# Patient Record
Sex: FEMALE | Race: BLACK OR AFRICAN AMERICAN | NOT HISPANIC OR LATINO | URBAN - METROPOLITAN AREA
[De-identification: names, ages, dates, MRNs, and addresses within clinical notes are randomized per-mention and may not be internally consistent; named-entity substitution may affect disease eponyms.]

---

## 2017-08-16 ENCOUNTER — OUTPATIENT (OUTPATIENT)
Dept: OUTPATIENT SERVICES | Facility: HOSPITAL | Age: 47
LOS: 1 days | End: 2017-08-16
Payer: COMMERCIAL

## 2017-08-16 ENCOUNTER — APPOINTMENT (OUTPATIENT)
Dept: MAMMOGRAPHY | Facility: CLINIC | Age: 47
End: 2017-08-16
Payer: COMMERCIAL

## 2017-08-16 DIAGNOSIS — Z12.31 ENCOUNTER FOR SCREENING MAMMOGRAM FOR MALIGNANT NEOPLASM OF BREAST: ICD-10-CM

## 2017-08-16 PROCEDURE — 77063 BREAST TOMOSYNTHESIS BI: CPT

## 2017-08-16 PROCEDURE — G0202: CPT | Mod: 26

## 2017-08-16 PROCEDURE — 77063 BREAST TOMOSYNTHESIS BI: CPT | Mod: 26

## 2017-08-16 PROCEDURE — 77067 SCR MAMMO BI INCL CAD: CPT

## 2020-02-26 ENCOUNTER — TRANSCRIPTION ENCOUNTER (OUTPATIENT)
Age: 50
End: 2020-02-26

## 2020-04-25 ENCOUNTER — MESSAGE (OUTPATIENT)
Age: 50
End: 2020-04-25

## 2020-05-05 LAB
SARS-COV-2 IGG SERPL IA-ACNC: <0.1 INDEX
SARS-COV-2 IGG SERPL QL IA: NEGATIVE

## 2020-12-12 ENCOUNTER — TRANSCRIPTION ENCOUNTER (OUTPATIENT)
Age: 50
End: 2020-12-12

## 2021-04-11 ENCOUNTER — EMERGENCY (EMERGENCY)
Facility: HOSPITAL | Age: 51
LOS: 1 days | Discharge: HOME | End: 2021-04-11
Attending: EMERGENCY MEDICINE
Payer: COMMERCIAL

## 2021-04-11 VITALS
OXYGEN SATURATION: 95 % | HEIGHT: 68 IN | HEART RATE: 95 BPM | TEMPERATURE: 98 F | SYSTOLIC BLOOD PRESSURE: 152 MMHG | WEIGHT: 210.1 LBS | DIASTOLIC BLOOD PRESSURE: 85 MMHG | RESPIRATION RATE: 18 BRPM

## 2021-04-11 PROCEDURE — 99283 EMERGENCY DEPT VISIT LOW MDM: CPT

## 2021-04-11 RX ORDER — TETANUS TOXOID, REDUCED DIPHTHERIA TOXOID AND ACELLULAR PERTUSSIS VACCINE, ADSORBED 5; 2.5; 8; 8; 2.5 [IU]/.5ML; [IU]/.5ML; UG/.5ML; UG/.5ML; UG/.5ML
0.5 SUSPENSION INTRAMUSCULAR ONCE
Refills: 0 | Status: COMPLETED | OUTPATIENT
Start: 2021-04-11 | End: 2021-04-11

## 2021-04-11 RX ADMIN — TETANUS TOXOID, REDUCED DIPHTHERIA TOXOID AND ACELLULAR PERTUSSIS VACCINE, ADSORBED 0.5 MILLILITER(S): 5; 2.5; 8; 8; 2.5 SUSPENSION INTRAMUSCULAR at 17:41

## 2021-04-11 NOTE — ED PROVIDER NOTE - PHYSICAL EXAMINATION
General: WDWN, NAD  CV: Pulse rate normal  Pulm: No increased WOB  Abdomen: non-distended  MSK: No limited ROM  Neuro: AAOx3  Skin: No active bleeding or visible wounds. Normal color for race and age

## 2021-04-11 NOTE — ED PROVIDER NOTE - CLINICAL SUMMARY MEDICAL DECISION MAKING FREE TEXT BOX
50F PMH DM presents after needle stick injury. Will follow protocol for employee exposure. On going discussions with admin to stratify risk of exposure. 50F [employer] employer  PM DM presents after needle stick injury. pt who she drown blood elderly lady with  no hiv or hs of hepatitis ,    Will follow protocol for employee exposure. On going discussions with admin to stratify risk of exposure. ZR

## 2021-04-11 NOTE — ED PROVIDER NOTE - OBJECTIVE STATEMENT
50F PMH DM presents after needlestick. Pt is phlebotomist on floor. States after she kathi blood she felt a prick on her hand. Didn't think anything of it but when she took her gloves off she had a small amount of blood on her L hand. Believes she was stuck by a partially retracted butterfly. Last tdap unknown.

## 2021-04-11 NOTE — ED PROVIDER NOTE - PATIENT PORTAL LINK FT
You can access the FollowMyHealth Patient Portal offered by [facility] by registering at the following website: [email or web address]. By joining [email or web address]’s FollowMyHealth portal, you will also be able to view your health information using other applications (apps) compatible with our system.

## 2021-04-11 NOTE — ED ADULT NURSE NOTE - OBJECTIVE STATEMENT
51 yo F pmh of DM ambulated to ED from work s/p needlestick.  Pt VSS.  Safety and comfort maintained.  needlstick packet filled out and blood specimens sent to lab.  Safety and comfort maintained. Will continue to monitor.

## 2021-04-11 NOTE — ED PROVIDER NOTE - NSFOLLOWUPINSTRUCTIONS_ED_ALL_ED_FT
Please follow up with [facility] in the next 3 days. Please return to the ER with any questions or concerns.     Please refer to the occupational exposure packet you were given for additional information

## 2021-06-21 PROBLEM — Z00.00 ENCOUNTER FOR PREVENTIVE HEALTH EXAMINATION: Noted: 2021-06-21

## 2021-06-29 ENCOUNTER — NON-APPOINTMENT (OUTPATIENT)
Age: 51
End: 2021-06-29

## 2021-06-29 ENCOUNTER — APPOINTMENT (OUTPATIENT)
Dept: VASCULAR SURGERY | Facility: CLINIC | Age: 51
End: 2021-06-29
Payer: COMMERCIAL

## 2021-06-29 VITALS
BODY MASS INDEX: 33.75 KG/M2 | HEIGHT: 66 IN | DIASTOLIC BLOOD PRESSURE: 86 MMHG | HEART RATE: 89 BPM | TEMPERATURE: 97.1 F | SYSTOLIC BLOOD PRESSURE: 124 MMHG | WEIGHT: 210 LBS

## 2021-06-29 PROCEDURE — 99203 OFFICE O/P NEW LOW 30 MIN: CPT

## 2021-06-29 PROCEDURE — 99072 ADDL SUPL MATRL&STAF TM PHE: CPT

## 2021-06-29 PROCEDURE — 93970 EXTREMITY STUDY: CPT

## 2021-06-29 NOTE — HISTORY OF PRESENT ILLNESS
[FreeTextEntry1] : Ms. GAVIN SANDOVAL is a 50 year who presents for evaluation of bilateral leg pain for the past 12 months. \par Patient's leg discomfort is associated with leg swelling, fatigue, heaviness and achiness. Patient complains of painful varicose veins. Patient's symptoms have persisted despite conservative management with leg elevation, exercise, attempted weight loss, over-the-counter medications (ibuprofen), and compression stocking use for more than 3 months. Patient's symptoms are alleviated by wearing compression stockings, leg elevation, exercise. \par Patient stands for prolonged periods of time with the inability to take frequent breaks to elevate their legs. \par \par \par

## 2021-06-29 NOTE — PHYSICAL EXAM
[Normal Breath Sounds] : Normal breath sounds [Normal Heart Sounds] : normal heart sounds [2+] : left 2+ [Varicose Veins Of Lower Extremities] : bilaterally [Ankle Swelling On The Left] : moderate [Ankle Swelling (On Exam)] : not present [] : not present

## 2021-08-02 ENCOUNTER — APPOINTMENT (OUTPATIENT)
Dept: VASCULAR SURGERY | Facility: CLINIC | Age: 51
End: 2021-08-02

## 2021-08-16 ENCOUNTER — TRANSCRIPTION ENCOUNTER (OUTPATIENT)
Age: 51
End: 2021-08-16

## 2021-08-16 ENCOUNTER — APPOINTMENT (OUTPATIENT)
Dept: VASCULAR SURGERY | Facility: CLINIC | Age: 51
End: 2021-08-16
Payer: COMMERCIAL

## 2021-08-16 DIAGNOSIS — Z86.39 PERSONAL HISTORY OF OTHER ENDOCRINE, NUTRITIONAL AND METABOLIC DISEASE: ICD-10-CM

## 2021-08-16 PROCEDURE — 37766 PHLEB VEINS - EXTREM 20+: CPT | Mod: RT

## 2021-08-16 NOTE — PROCEDURE
[FreeTextEntry1] : right stab phlebectomy [FreeTextEntry3] : Procedural safety checklist and time out completed:\par Confirmed patient identification (Patient Name, , and/or medical record number including when possible affirmation by patient or parent/family/other.\par Confirmed procedure with the patient. Consent present, accurate and signed. \par Confirmed special equipment and supplies are present.\par Sterility confirmed. Position verified. \par Site/ side is marked and visible and confirmed. \par Procedure confirmed by consent. Accurate consent including side and site.\par Review of medical records noting correct procedure including site and side.\par MD/PA verifies presence and review of imaging studies and or written report of imaging studies.\par Specify equipment are available for the planned procedure.\par MD/PA has marked the patient's procedural site and side.\par Agreement on the procedure to be performed\par Time out completed.\par All of the above has been confirmed by the team.\par All patient-specific concerns have been addressed. \par \par Indication:  right lower extremity varicose veins with inflammation, leg pain, leg swelling, and leg cramping.  \par \par Procedure: Stab phlebectomy right lower extremity\par \par  Ms. GAVIN SANDOVAL is a 50 year old F with a history of symptomatic right lower extremity varicose veins. A trial of compression stockings, exercise, elevation, and pain medication was attempted without relief and definitive treatment with microphlebectomy was offered. \par \par I have discussed the risks of the procedure at length with the patient. The risks discussed were inclusive of but not limited to infection, irritation at the site of infiltration of local anesthesia, and also rare risk of deep venous thrombosis and pulmonary emboli. The patient agrees to proceed with the procedure. \par The patient was escorted into the procedure room, the varicose veins for treatment were marked out and the patient placed on the examination table. The entire limb was prepped and draped in sterile fashion and a  time out was called. \par \par Local anesthesia using 30 cc 1% lidocaine was infiltrated using a 25 gauge needle over the previously marked prominent varicose vein sites.\par Multiple small stab incisions, each less than 1 cm in length was made at the noted sites. With the help of a vein hook, the vein was fished out at each of these sites, rolled over a narrow-tipped mosquito clamp and removed. Hemostasis was secured with leg elevation and application of manual pressure. After assuring hemostasis, a sterile 4x4 was placed on the access sites and an ACE compression wrap was applied. Estimated blood loss: minimal. Patient tolerated procedure well. Patient was given post-procedure instructions and follow up appointment was scheduled. \par \par SIDE - RIGHT \par SITE - CALF / THIGH\par LOCATION - LATERAL / ANTERIOR \par Total Stab Incisions > 20\par \par

## 2021-09-08 ENCOUNTER — APPOINTMENT (OUTPATIENT)
Dept: VASCULAR SURGERY | Facility: CLINIC | Age: 51
End: 2021-09-08
Payer: COMMERCIAL

## 2021-09-08 DIAGNOSIS — I83.893 VARICOSE VEINS OF BILATERAL LOWER EXTREMITIES WITH OTHER COMPLICATIONS: ICD-10-CM

## 2021-09-08 PROCEDURE — 37766 PHLEB VEINS - EXTREM 20+: CPT | Mod: LT

## 2021-09-08 NOTE — PROCEDURE
[FreeTextEntry1] : left stab phlebectomy [FreeTextEntry3] : Procedural safety checklist and time out completed:\par Confirmed patient identification (Patient Name, , and/or medical record number including when possible affirmation by patient or parent/family/other.\par Confirmed procedure with the patient. Consent present, accurate and signed. \par Confirmed special equipment and supplies are present.\par Sterility confirmed. Position verified. \par Site/ side is marked and visible and confirmed. \par Procedure confirmed by consent. Accurate consent including side and site.\par Review of medical records noting correct procedure including site and side.\par MD/PA verifies presence and review of imaging studies and or written report of imaging studies.\par Specify equipment are available for the planned procedure.\par MD/PA has marked the patient's procedural site and side.\par Agreement on the procedure to be performed\par Time out completed.\par All of the above has been confirmed by the team.\par All patient-specific concerns have been addressed. \par \par Indication:  left lower extremity varicose veins with inflammation, leg pain, leg swelling, and leg cramping.  \par \par Procedure: Stab phlebectomy left lower extremity\par \par  Ms. GAVIN SANDOVAL is a 50 year old F with a history of symptomatic left lower extremity varicose veins. A trial of compression stockings, exercise, elevation, and pain medication was attempted without relief and definitive treatment with microphlebectomy was offered. \par \par I have discussed the risks of the procedure at length with the patient. The risks discussed were inclusive of but not limited to infection, irritation at the site of infiltration of local anesthesia, and also rare risk of deep venous thrombosis and pulmonary emboli. The patient agrees to proceed with the procedure. \par The patient was escorted into the procedure room, the varicose veins for treatment were marked out and the patient placed on the examination table. The entire limb was prepped and draped in sterile fashion and a  time out was called. \par \par Local anesthesia using 40 cc 1% lidocaine was infiltrated using a 25 gauge needle over the previously marked prominent varicose vein sites.\par Multiple small stab incisions, each less than 1 cm in length was made at the noted sites. With the help of a vein hook, the vein was fished out at each of these sites, rolled over a narrow-tipped mosquito clamp and removed. Hemostasis was secured with leg elevation and application of manual pressure. After assuring hemostasis, a sterile 4x4 was placed on the access sites and an ACE compression wrap was applied. Estimated blood loss: minimal. Patient tolerated procedure well. Patient was given post-procedure instructions and follow up appointment was scheduled. \par \par SIDE - LEFT	\par SITE - CALF / THIGH\par LOCATION - MEDIAL / LATERAL  / POSTERIOR	\par Total Stab Incisions >20\par \par

## 2021-09-15 ENCOUNTER — APPOINTMENT (OUTPATIENT)
Dept: VASCULAR SURGERY | Facility: CLINIC | Age: 51
End: 2021-09-15

## 2021-10-11 ENCOUNTER — INPATIENT (INPATIENT)
Facility: HOSPITAL | Age: 51
LOS: 1 days | Discharge: HOME | DRG: 247 | End: 2021-10-13
Attending: STUDENT IN AN ORGANIZED HEALTH CARE EDUCATION/TRAINING PROGRAM | Admitting: HOSPITALIST
Payer: COMMERCIAL

## 2021-10-11 VITALS — HEIGHT: 68 IN | WEIGHT: 179.9 LBS

## 2021-10-11 DIAGNOSIS — R09.89 OTHER SPECIFIED SYMPTOMS AND SIGNS INVOLVING THE CIRCULATORY AND RESPIRATORY SYSTEMS: ICD-10-CM

## 2021-10-11 LAB
ALBUMIN SERPL ELPH-MCNC: 4.5 G/DL — SIGNIFICANT CHANGE UP (ref 3.3–5)
ALP SERPL-CCNC: 118 U/L — SIGNIFICANT CHANGE UP (ref 40–120)
ALT FLD-CCNC: 16 U/L — SIGNIFICANT CHANGE UP (ref 10–45)
ANION GAP SERPL CALC-SCNC: 15 MMOL/L — SIGNIFICANT CHANGE UP (ref 5–17)
APTT BLD: 27.2 SEC — LOW (ref 27.5–35.5)
AST SERPL-CCNC: 16 U/L — SIGNIFICANT CHANGE UP (ref 10–40)
BASOPHILS # BLD AUTO: 0.06 K/UL — SIGNIFICANT CHANGE UP (ref 0–0.2)
BASOPHILS NFR BLD AUTO: 0.7 % — SIGNIFICANT CHANGE UP (ref 0–2)
BILIRUB SERPL-MCNC: 0.1 MG/DL — LOW (ref 0.2–1.2)
BUN SERPL-MCNC: 15 MG/DL — SIGNIFICANT CHANGE UP (ref 7–23)
CALCIUM SERPL-MCNC: 10.2 MG/DL — SIGNIFICANT CHANGE UP (ref 8.4–10.5)
CHLORIDE SERPL-SCNC: 100 MMOL/L — SIGNIFICANT CHANGE UP (ref 96–108)
CO2 SERPL-SCNC: 24 MMOL/L — SIGNIFICANT CHANGE UP (ref 22–31)
CREAT SERPL-MCNC: 0.66 MG/DL — SIGNIFICANT CHANGE UP (ref 0.5–1.3)
D DIMER BLD IA.RAPID-MCNC: <150 NG/ML DDU — SIGNIFICANT CHANGE UP
EOSINOPHIL # BLD AUTO: 0.1 K/UL — SIGNIFICANT CHANGE UP (ref 0–0.5)
EOSINOPHIL NFR BLD AUTO: 1.2 % — SIGNIFICANT CHANGE UP (ref 0–6)
GLUCOSE SERPL-MCNC: 431 MG/DL — HIGH (ref 70–99)
HCG SERPL-ACNC: <2 MIU/ML — SIGNIFICANT CHANGE UP
HCT VFR BLD CALC: 41 % — SIGNIFICANT CHANGE UP (ref 34.5–45)
HGB BLD-MCNC: 12 G/DL — SIGNIFICANT CHANGE UP (ref 11.5–15.5)
IMM GRANULOCYTES NFR BLD AUTO: 0.2 % — SIGNIFICANT CHANGE UP (ref 0–1.5)
INR BLD: 0.99 RATIO — SIGNIFICANT CHANGE UP (ref 0.88–1.16)
LYMPHOCYTES # BLD AUTO: 2.45 K/UL — SIGNIFICANT CHANGE UP (ref 1–3.3)
LYMPHOCYTES # BLD AUTO: 30.3 % — SIGNIFICANT CHANGE UP (ref 13–44)
MAGNESIUM SERPL-MCNC: 1.8 MG/DL — SIGNIFICANT CHANGE UP (ref 1.6–2.6)
MCHC RBC-ENTMCNC: 22.3 PG — LOW (ref 27–34)
MCHC RBC-ENTMCNC: 29.3 GM/DL — LOW (ref 32–36)
MCV RBC AUTO: 76.2 FL — LOW (ref 80–100)
MONOCYTES # BLD AUTO: 0.58 K/UL — SIGNIFICANT CHANGE UP (ref 0–0.9)
MONOCYTES NFR BLD AUTO: 7.2 % — SIGNIFICANT CHANGE UP (ref 2–14)
NEUTROPHILS # BLD AUTO: 4.87 K/UL — SIGNIFICANT CHANGE UP (ref 1.8–7.4)
NEUTROPHILS NFR BLD AUTO: 60.4 % — SIGNIFICANT CHANGE UP (ref 43–77)
NRBC # BLD: 0 /100 WBCS — SIGNIFICANT CHANGE UP (ref 0–0)
NT-PROBNP SERPL-SCNC: 119 PG/ML — SIGNIFICANT CHANGE UP (ref 0–300)
PLATELET # BLD AUTO: 285 K/UL — SIGNIFICANT CHANGE UP (ref 150–400)
POTASSIUM SERPL-MCNC: 3.9 MMOL/L — SIGNIFICANT CHANGE UP (ref 3.5–5.3)
POTASSIUM SERPL-SCNC: 3.9 MMOL/L — SIGNIFICANT CHANGE UP (ref 3.5–5.3)
PROT SERPL-MCNC: 7.5 G/DL — SIGNIFICANT CHANGE UP (ref 6–8.3)
PROTHROM AB SERPL-ACNC: 11.9 SEC — SIGNIFICANT CHANGE UP (ref 10.6–13.6)
RBC # BLD: 5.38 M/UL — HIGH (ref 3.8–5.2)
RBC # FLD: 13.7 % — SIGNIFICANT CHANGE UP (ref 10.3–14.5)
SODIUM SERPL-SCNC: 139 MMOL/L — SIGNIFICANT CHANGE UP (ref 135–145)
TROPONIN T, HIGH SENSITIVITY RESULT: 49 NG/L — SIGNIFICANT CHANGE UP (ref 0–51)
TROPONIN T, HIGH SENSITIVITY RESULT: 9 NG/L — SIGNIFICANT CHANGE UP (ref 0–51)
WBC # BLD: 8.08 K/UL — SIGNIFICANT CHANGE UP (ref 3.8–10.5)
WBC # FLD AUTO: 8.08 K/UL — SIGNIFICANT CHANGE UP (ref 3.8–10.5)

## 2021-10-11 PROCEDURE — 99291 CRITICAL CARE FIRST HOUR: CPT

## 2021-10-11 PROCEDURE — 99223 1ST HOSP IP/OBS HIGH 75: CPT

## 2021-10-11 PROCEDURE — 71045 X-RAY EXAM CHEST 1 VIEW: CPT | Mod: 26

## 2021-10-11 PROCEDURE — 93010 ELECTROCARDIOGRAM REPORT: CPT | Mod: 76

## 2021-10-11 RX ORDER — POTASSIUM CHLORIDE 20 MEQ
40 PACKET (EA) ORAL ONCE
Refills: 0 | Status: COMPLETED | OUTPATIENT
Start: 2021-10-11 | End: 2021-10-11

## 2021-10-11 RX ORDER — TICAGRELOR 90 MG/1
180 TABLET ORAL ONCE
Refills: 0 | Status: COMPLETED | OUTPATIENT
Start: 2021-10-11 | End: 2021-10-11

## 2021-10-11 RX ORDER — ASPIRIN/CALCIUM CARB/MAGNESIUM 324 MG
324 TABLET ORAL ONCE
Refills: 0 | Status: COMPLETED | OUTPATIENT
Start: 2021-10-11 | End: 2021-10-11

## 2021-10-11 RX ORDER — HEPARIN SODIUM 5000 [USP'U]/ML
5000 INJECTION INTRAVENOUS; SUBCUTANEOUS ONCE
Refills: 0 | Status: COMPLETED | OUTPATIENT
Start: 2021-10-11 | End: 2021-10-11

## 2021-10-11 RX ORDER — HEPARIN SODIUM 5000 [USP'U]/ML
INJECTION INTRAVENOUS; SUBCUTANEOUS
Qty: 25000 | Refills: 0 | Status: DISCONTINUED | OUTPATIENT
Start: 2021-10-11 | End: 2021-10-12

## 2021-10-11 RX ORDER — HEPARIN SODIUM 5000 [USP'U]/ML
6000 INJECTION INTRAVENOUS; SUBCUTANEOUS EVERY 6 HOURS
Refills: 0 | Status: DISCONTINUED | OUTPATIENT
Start: 2021-10-11 | End: 2021-10-12

## 2021-10-11 RX ORDER — NITROGLYCERIN 6.5 MG
0.4 CAPSULE, EXTENDED RELEASE ORAL ONCE
Refills: 0 | Status: COMPLETED | OUTPATIENT
Start: 2021-10-11 | End: 2021-10-11

## 2021-10-11 RX ORDER — MAGNESIUM SULFATE 500 MG/ML
1 VIAL (ML) INJECTION ONCE
Refills: 0 | Status: COMPLETED | OUTPATIENT
Start: 2021-10-11 | End: 2021-10-11

## 2021-10-11 RX ADMIN — Medication 100 GRAM(S): at 22:00

## 2021-10-11 RX ADMIN — HEPARIN SODIUM 5000 UNIT(S): 5000 INJECTION INTRAVENOUS; SUBCUTANEOUS at 23:53

## 2021-10-11 RX ADMIN — Medication 324 MILLIGRAM(S): at 20:46

## 2021-10-11 RX ADMIN — HEPARIN SODIUM 1000 UNIT(S)/HR: 5000 INJECTION INTRAVENOUS; SUBCUTANEOUS at 23:53

## 2021-10-11 RX ADMIN — Medication 40 MILLIEQUIVALENT(S): at 22:01

## 2021-10-11 NOTE — CONSULT NOTE ADULT - ASSESSMENT
52yo F with history of DM on metformin only who presents with chest pain. Started on treatment for NSTEMI. Plan for likely early catheterization.     #Chest pain - typical, initial ECG changes of 1mm NICOLE in AVR + anterolateral STD (resolved when symptoms free), trop 9->49; all suggestive of likely underlying subendocardial ischemia  #Elevated troponin - NSTEMI; DANIEL 2-3    Plan:  -Resume DAPT + heparin gtt  -Start Atorvastatin 80mg now   -Order TTE  -NPO post midnight for likely catheterization tomorrow   -Obtain CK/CKMB with next troponin (would obtain at 00:30 on 10/12)  -Serial ECG (q4-6 hours or earlier if new CP)    Cuca Pierre MD  Cardiology Fellow - PGY 4  For all New Consults and Questions:  [email or web address]   Login: [employer]

## 2021-10-11 NOTE — H&P ADULT - NSHPLABSRESULTS_GEN_ALL_CORE
Labs, imaging and EKG personally reviewed and interpreted by me.                           12.0   8.08  )-----------( 285      ( 11 Oct 2021 20:37 )             41.0     10-11    139  |  100  |  15  ----------------------------<  431<H>  3.9   |  24  |  0.66    Ca    10.2      11 Oct 2021 20:37  Mg     1.8     10-11    TPro  7.5  /  Alb  4.5  /  TBili  0.1<L>  /  DBili  x   /  AST  16  /  ALT  16  /  AlkPhos  118  10-11        PT/INR - ( 11 Oct 2021 20:37 )   PT: 11.9 sec;   INR: 0.99 ratio         PTT - ( 11 Oct 2021 20:37 )  PTT:27.2 sec Labs, imaging and EKG personally reviewed and interpreted by me.   labs notable for normal WBC, HB, +thalassemia, normal lytes and Cr. Trop 9-->49  Imaging personally reviewed and interpreted by me - CXR clear lungs.   EKG personally reviewed and interpreted by me - initial EKG sinus with 1mm NICOLE in AVR + anterolateral STD; repeat EKG show resolution of NICOLE/STD, now with TWI in lateral leads.                           12.0   8.08  )-----------( 285      ( 11 Oct 2021 20:37 )             41.0     10-11    139  |  100  |  15  ----------------------------<  431<H>  3.9   |  24  |  0.66    Ca    10.2      11 Oct 2021 20:37  Mg     1.8     10-11    TPro  7.5  /  Alb  4.5  /  TBili  0.1<L>  /  DBili  x   /  AST  16  /  ALT  16  /  AlkPhos  118  10-11        PT/INR - ( 11 Oct 2021 20:37 )   PT: 11.9 sec;   INR: 0.99 ratio         PTT - ( 11 Oct 2021 20:37 )  PTT:27.2 sec    < from: Xray Chest 1 View- PORTABLE-Urgent (10.11.21 @ 20:59) >    FINDINGS:    Lungs are clear. No pleural effusion or pneumothorax.  Cardiac size cannot be accurately assessed in this projection, however is not enlarged. No acute osseous abnormality.    IMPRESSION:  Clear lungs    < end of copied text >

## 2021-10-11 NOTE — ED ADULT NURSE NOTE - NSIMPLEMENTINTERV_GEN_ALL_ED
Implemented All Fall with Harm Risk Interventions:  Coward to call system. Call bell, personal items and telephone within reach. Instruct patient to call for assistance. Room bathroom lighting operational. Non-slip footwear when patient is off stretcher. Physically safe environment: no spills, clutter or unnecessary equipment. Stretcher in lowest position, wheels locked, appropriate side rails in place. Provide visual cue, wrist band, yellow gown, etc. Monitor gait and stability. Monitor for mental status changes and reorient to person, place, and time. Review medications for side effects contributing to fall risk. Reinforce activity limits and safety measures with patient and family. Provide visual clues: red socks.

## 2021-10-11 NOTE — H&P ADULT - HISTORY OF PRESENT ILLNESS
51F with PMH of T2DM on metformin p/w CP. 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 CP is midsternal, 10/10, felt like "something sitting on her chest," lasted approx 10 minutes before starting to improve slowly; had associated R arm numbness and heaviness and mild diaphoresis, no associated SOB, lightheadedness, palpitations nausea/vomiting. No exertional or positional component to the pain. Pt recalls she had very mild burning CP lasting a few minutes the night prior which self resolved. Prior to these episodes, had never had pain like this in the past. No personal history of CAD, no smoking history; no family history of CAD in 1st degree members. Currently CP free.     Had otherwise been in usual state of health - denies fevers, chills, abd pain, cough,  or GI symptoms, no LE swelling or pain. Rest of ROS negative.

## 2021-10-11 NOTE — ED PROVIDER NOTE - CLINICAL SUMMARY MEDICAL DECISION MAKING FREE TEXT BOX
52y/o F w/ h/o T2DM p/w chest pain sudden onset with tachycardia. DDx ACS vs PE, unlikely aortic dissection. Plan d-dimer, cardiac labs, EKG, CXR and ASA reassess. 50y/o F w/ h/o T2DM p/w chest pain sudden onset with tachycardia. DDx ACS vs PE, unlikely aortic dissection. Plan d-dimer, cardiac labs, EKG, CXR and ASA reassess.    Attending Quang: 50 y/o F w/ PMH of DM presenting w/ chest pain. Seen in green. 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 Described as something heavy sitting on her chest. Severe, non radiating. Associated w/ R arm numbness and sweating. Had similar pain last night while at rest that went away on its own. Pain starting to improve at this time. No SOB. On exam pt well appearing, no acute distress. Lungs clear. HR regular. Abd nondistended/soft/nontender. No LE edema. EKG w/ aVR 1 mm elevation w/ ant/lat mild ST depressions. Aspirin given. Will speak w/ cards w/ concern for STEMI, repeat EKG to eval for dynamic changes. Do not suspect PE or dissection. Plan for labs, meds, EKG, tele monitoring, CXR, cards consult. Pt will require admission for further workup. Will reassess the need for additional interventions as clinically warranted.

## 2021-10-11 NOTE — ED ADULT NURSE NOTE - OBJECTIVE STATEMENT
52 y/o Female presenting to the ED, A&Ox3, complaining of sudden onset of CP while at work and sudden onset of numbness in the right arm. Pt hx of diabetes, no other medical hx. 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 Pt denies lifting anything heavy prior to event beginning. Pt found to be hypertensive and tachycardiac at work so she was checked in. Pt on cardiac monitor showing sinus tachycardia. Pt denies SOB, N/V/D, radiation of pain to jaw or left arm, fever, chills, cough. MD at bedside with US to evaluate cardiac activity. Safety and comfort measures provided, bed locked and in lowest position, side rails up for safety. Call bell within reach. Awaiting results.

## 2021-10-11 NOTE — ED ADULT NURSE REASSESSMENT NOTE - NS ED NURSE REASSESS COMMENT FT1
Pt reports chest pain has decreased and reports feeling much better. Pt found to have BP of 134/67, as per MD [place], no nitroglycerin given at this time. Pt remains on cardiac monitor showing normal sinus rhythm to the 90's. Pt appears well, in no current distress. Safety and comfort measures provided, bed locked and in lowest position, side rails up for safety. Call bell within reach. Awaiting disposition

## 2021-10-11 NOTE — ED PROVIDER NOTE - PHYSICAL EXAMINATION
Gen: NAD, non-toxic appearing  Head: normal appearing  HEENT: normal conjunctiva, oral mucosa moist  Lung: no respiratory distress, speaking in full sentences, CTA b/l     CV: tachycardic  rate and regular rhythm, no murmurs  Abd: soft, non distended, non tender   MSK: no visible deformities  Neuro: No focal deficits, AAOx3  Skin: Warm  Psych: normal affect

## 2021-10-11 NOTE — ED PROVIDER NOTE - NS ED ROS FT
GENERAL: No fever, no chills  EYES: no change in vision  HEENT: no trouble swallowing, no trouble speaking  CARDIAC: + chest pain, no palpitations  PULMONARY: no cough, no SOB  GI: no abdominal pain, no nausea, no vomiting, no diarrhea, no constipation  : no dysuria, no frequency, no change in appearance, no odor of urine  SKIN: no rashes  NEURO: no headache, no weakness  MSK: +arm pain; no joint pain

## 2021-10-11 NOTE — ED PROVIDER NOTE - OBJECTIVE STATEMENT
52y/o F w/ h/o T2DM p/w chest pain sudden onset starting at 8pm left side with R arm numbness described as heaviness. Had same episode last night which went away on its own. No sob, diaphoresis, n/v, syncope.

## 2021-10-11 NOTE — H&P ADULT - NSHPSOCIALHISTORY_GEN_ALL_CORE
never smoker, rare etoh, no drugs   independent with ADLs, works as PCA at [employer]

## 2021-10-11 NOTE — H&P ADULT - PROBLEM SELECTOR PLAN 1
pt with sudden onset CP and R arm numbness, found initially with NICOLE in AVR and STD in anterolateral leads, which have since resolved on subsequent EKGs. Troponin elevation from 9-->49 - c/f NSTEMI  - s/p ASA, brilinta and hep load - c/w ASA 81mg, Brilinta 90mg BID, heparin gtt  - start lipitor 80mg qhs   - trend top/Princess to peak   - check TTE in AM   - cardiology following - plan for cath in AM  - check A1c, lipid, TSH   - monitor on tele   - serial EKGs  - nitro prn CP

## 2021-10-11 NOTE — ED ADULT TRIAGE NOTE - MEANS OF ARRIVAL
Patient Education     Medroxyprogesterone injection [Contraceptive]  Brand Names: Depo-Provera, Depo-subQ Provera 104  What is this medicine?  MEDROXYPROGESTERONE (me DROX ee proe CALLI te do) contraceptive injections prevent pregnancy. They provide effective birth control for 3 months. Depo-subQ Provera 104 is also used for treating pain related to endometriosis.  How should I use this medicine?  Depo-Provera Contraceptive injection is given into a muscle. Depo-subQ Provera 104 injection is given under the skin. These injections are given by a health care professional. You must not be pregnant before getting an injection. The injection is usually given during the first 5 days after the start of a menstrual period or 6 weeks after delivery of a baby.  Talk to your pediatrician regarding the use of this medicine in children. Special care may be needed. These injections have been used in female children who have started having menstrual periods.  What side effects may I notice from receiving this medicine?  Side effects that you should report to your doctor or health care professional as soon as possible:  · allergic reactions like skin rash, itching or hives, swelling of the face, lips, or tongue  · breast tenderness or discharge  · breathing problems  · changes in vision  · depression  · feeling faint or lightheaded, falls  · fever  · pain in the abdomen, chest, groin, or leg  · problems with balance, talking, walking  · unusually weak or tired  · yellowing of the eyes or skin  Side effects that usually do not require medical attention (report to your doctor or health care professional if they continue or are bothersome):  · acne  · fluid retention and swelling  · headache  · irregular periods, spotting, or absent periods  · temporary pain, itching, or skin reaction at site where injected  · weight gain  What may interact with this medicine?  Do not take this medicine with any of the following  medications:  · bosentan  This medicine may also interact with the following medications:  · aminoglutethimide  · antibiotics or medicines for infections, especially rifampin, rifabutin, rifapentine, and griseofulvin  · aprepitant  · barbiturate medicines such as phenobarbital or primidone  · bexarotene  · carbamazepine  · medicines for seizures like ethotoin, felbamate, oxcarbazepine, phenytoin, topiramate  · modafinil  · [place]'s wort  What if I miss a dose?  Try not to miss a dose. You must get an injection once every 3 months to maintain birth control. If you cannot keep an appointment, call and reschedule it. If you wait longer than 13 weeks between Depo-Provera contraceptive injections or longer than 14 weeks between Depo-subQ Provera 104 injections, you could get pregnant. Use another method for birth control if you miss your appointment. You may also need a pregnancy test before receiving another injection.  Where should I keep my medicine?  This does not apply. The injection will be given to you by a health care professional.  What should I tell my health care provider before I take this medicine?  They need to know if you have any of these conditions:  · frequently drink alcohol  · asthma  · blood vessel disease or a history of a blood clot in the lungs or legs  · bone disease such as osteoporosis  · breast cancer  · diabetes  · eating disorder (anorexia nervosa or bulimia)  · high blood pressure  · HIV infection or AIDS  · kidney disease  · liver disease  · mental depression  · migraine  · seizures (convulsions)  · stroke  · tobacco smoker  · vaginal bleeding  · an unusual or allergic reaction to medroxyprogesterone, other hormones, medicines, foods, dyes, or preservatives  · pregnant or trying to get pregnant  · breast-feeding  What should I watch for while using this medicine?  This drug does not protect you against HIV infection (AIDS) or other sexually transmitted diseases.  Use of this product may  cause you to lose calcium from your bones. Loss of calcium may cause weak bones (osteoporosis). Only use this product for more than 2 years if other forms of birth control are not right for you. The longer you use this product for birth control the more likely you will be at risk for weak bones. Ask your health care professional how you can keep strong bones.  You may have a change in bleeding pattern or irregular periods. Many females stop having periods while taking this drug.  If you have received your injections on time, your chance of being pregnant is very low. If you think you may be pregnant, see your health care professional as soon as possible.  Tell your health care professional if you want to get pregnant within the next year. The effect of this medicine may last a long time after you get your last injection.  NOTE:This sheet is a summary. It may not cover all possible information. If you have questions about this medicine, talk to your doctor, pharmacist, or health care provider. Copyright© 2018 [employer]            wheelchair

## 2021-10-11 NOTE — H&P ADULT - NSHPREVIEWOFSYSTEMS_GEN_ALL_CORE
REVIEW OF SYSTEMS:    CONSTITUTIONAL: No weakness, fevers, chills  EYES/ENT: No visual changes;  no throat pain   NECK: No pain or stiffness  RESPIRATORY: No cough, no shortness of breath  CARDIOVASCULAR: +chest pain no palpitations no LE edema   GASTROINTESTINAL: no nausea, vomiting, no abdominal pain, no BRBPR  GENITOURINARY: no polyuria, no dysuria  NEUROLOGICAL: +R arm numbness, no headaches, no confusion   MUSCULOSKELETAL: no back pain, no focal weakness   SKIN: No itching, burning, rashes, or lesions   PSYCH: no anxiety, depression  HEME: no gum bleeding, no bruising

## 2021-10-11 NOTE — H&P ADULT - NSICDXPASTSURGICALHX_GEN_ALL_CORE_FT
PAST SURGICAL HISTORY:  No significant past surgical history      PAST SURGICAL HISTORY:  S/P cholecystectomy

## 2021-10-11 NOTE — ED ADULT NURSE REASSESSMENT NOTE - NS ED NURSE REASSESS COMMENT FT1
Second IV placed for additional access. Heparin bolus and infusion initiated as per orders based off Heparin nomogram. Second RN present to confirm correct medication administration. Patient currently safe and comfortable. patient made aware of admission. Pt placed in position of comfort. Pt educated on call bell system and provided call bell. Bed in lowest position, wheels locked, appropriate side rails raised. Pt denies needs at this time.

## 2021-10-11 NOTE — CONSULT NOTE ADULT - SUBJECTIVE AND OBJECTIVE BOX
Patient seen and evaluated at bedside    Chief Complaint: acute CP    HPI:  52yo F with history of DM on metformin only who presents with acute substernal 10/10 CP while at work with radiation to R arm. No associated SOB, palpitations, dizziness, diaphoresis. Had similar episode yesterday that self resolved in 10-15 minutes. When seen in ED, CP had improved/near resolved. Denies any previous history of exertional CP prior to the past 24 hours. HDS. Exam unremarkable. Labs notable for trop 9>49. Initial ECG showing slight NICOLE in AVR w/ STD in anterolateral leads. Repeat ECG shows resolved changes mentioned. POCUS at bedside showed good systolic function and no overt RWMA. Given large delta troponin increase along with CP and ECG changes, patient loaded on DAPT and started heparin gtt.     PMHx:   No pertinent past medical history    Diabetes        PSHx:   No significant past surgical history        Allergies:  No Known Allergies      Home Meds: As per admission medication reconcilliation.     Current Medications:   heparin   Injectable 6000 Unit(s) IV Push every 6 hours PRN  heparin  Infusion.  Unit(s)/Hr IV Continuous <Continuous>  ticagrelor 180 milliGRAM(s) Oral Once      FAMILY HISTORY: No family cardiac history       Social History: Works at [employer]. No active tobacco or etoh.     REVIEW OF SYSTEMS:  Constitutional:     [x ] negative [ ] fevers [ ] chills [ ] weight loss [ ] weight gain  HEENT:                  [x ] negative [ ] dry eyes [ ] eye irritation [ ] postnasal drip [ ] nasal congestion  CV:                         [ x] negative  [ ] chest pain [ ] orthopnea [ ] palpitations [ ] murmur  Resp:                     [x ] negative [ ] cough [ ] shortness of breath [ ] dyspnea [ ] wheezing [ ] sputum [ ]hemoptysis  GI:                          [ x] negative [ ] nausea [ ] vomiting [ ] diarrhea [ ] constipation [ ] abd pain [ ] dysphagia   :                        [ x] negative [ ] dysuria [ ] nocturia [ ] hematuria [ ] increased urinary frequency  Musculoskeletal: [x ] negative [ ] back pain [ ] myalgias [ ] arthralgias [ ] fracture  Skin:                       [ x] negative [ ] rash [ ] itch  Neurological:        [ x] negative [ ] headache [ ] dizziness [ ] syncope [ ] weakness [ ] numbness  Psychiatric:           [ x] negative [ ] anxiety [ ] depression  Endocrine:            [ x] negative [ ] diabetes [ ] thyroid problem  Heme/Lymph:      [ x] negative [ ] anemia [ ] bleeding problem  Allergic/Immune: [ x] negative [ ] itchy eyes [ ] nasal discharge [ ] hives [ ] angioedema    [ x] All other systems negative  [ ] Unable to assess ROS due to      Physical Exam:  T(F): 98.2 (10-11), Max: 98.2 (10-11)  HR: 98 (10-11) (98 - 130)  BP: 134/67 (10-11) (134/67 - 157/99)  RR: 18 (10-11)  SpO2: 99% (10-11)  General: Alert, no acute distress, appears comfortable   HEENT: No scleral icterus, EOMI, no facial dysmorphia, no external ear lesions   Cardiac: Regular rate and rhythm, no murmurs, no rubs, no gallops   Pulmonary: Clear breath sounds throughout, no wheezing, no stridor, no crackles   Abdomen: Nondistended, nontender, appears soft   Skin: no obvious rash or lesions   Extremities: no LE edema  Neurological: Moving all 4 extremities, no overt focal deficits noted   Psych: normal mood and affect     Cardiovascular Diagnostic Testing:    ECG: Personally reviewed:  Initial ECG showing slight NICOLE in AVR w/ STD in anterolateral leads. Repeat ECG shows resolved changes mentioned.    Echo: Personally reviewed:  POCUS at bedside showed good systolic function and no overt RWMA.     CXR: Personally reviewed    Labs: Personally reviewed                        12.0   8.08  )-----------( 285      ( 11 Oct 2021 20:37 )             41.0     10-11    139  |  100  |  15  ----------------------------<  431<H>  3.9   |  24  |  0.66    Ca    10.2      11 Oct 2021 20:37  Mg     1.8     10-11    TPro  7.5  /  Alb  4.5  /  TBili  0.1<L>  /  DBili  x   /  AST  16  /  ALT  16  /  AlkPhos  118  10-11    PT/INR - ( 11 Oct 2021 20:37 )   PT: 11.9 sec;   INR: 0.99 ratio         PTT - ( 11 Oct 2021 20:37 )  PTT:27.2 sec  Serum Pro-Brain Natriuretic Peptide: 119 pg/mL (10-11 @ 20:37)

## 2021-10-11 NOTE — ED PROVIDER NOTE - PROGRESS NOTE DETAILS
Darin, PGY3: cards consulted for NICOLE aVR. will see pt in ED Attending Nello: rpt ekg w/o AVR elevation. Possible 2/2 demand as HR now improved from initial eval? Pending cards recs. Attending Quang: seen by cards. Plan for further cardiac testing in AM. Awaiting cardiac enzymes. Will give dose of nitro Attending Nello: nitro held as BP improved and chest pain improved. Pending admission Lemuel Quezada MD. trop 49 from 9. no cp right now. pt hd stable. updated cards: plan for heparin bolus/gtt, brilinta load. plan for cath tomorrow morning. admitted to tele. pt agreeable w/ plan Lemuel Seo MD. trop 49 from 9. no cp right now. pt hd stable. updated cards: plan for heparin bolus/gtt, brilinta load. plan for cath tomorrow morning. admitted to tele. pt agreeable w/ plan    pettet attending- patient signed out to me and dr. seo, patient with uptrending trop, no chest pain no dynamic changes on ekg discussed with cards agreed with heparin gtt, brillinta load, had aspirin, will eval for possible cath in am. patient aware of plan will continue to monitor on telemetry and close observation.

## 2021-10-11 NOTE — H&P ADULT - NSHPPHYSICALEXAM_GEN_ALL_CORE
Vital Signs Last 24 Hrs  T(C): 36.8 (11 Oct 2021 22:00), Max: 36.8 (11 Oct 2021 20:29)  T(F): 98.2 (11 Oct 2021 22:00), Max: 98.2 (11 Oct 2021 20:29)  HR: 98 (11 Oct 2021 22:00) (98 - 130)  BP: 134/67 (11 Oct 2021 22:00) (134/67 - 157/99)  BP(mean): --  RR: 18 (11 Oct 2021 22:00) (18 - 22)  SpO2: 99% (11 Oct 2021 22:00) (99% - 100%) Vital Signs Last 24 Hrs  T(C): 36.8 (11 Oct 2021 22:00), Max: 36.8 (11 Oct 2021 20:29)  T(F): 98.2 (11 Oct 2021 22:00), Max: 98.2 (11 Oct 2021 20:29)  HR: 98 (11 Oct 2021 22:00) (98 - 130)  BP: 134/67 (11 Oct 2021 22:00) (134/67 - 157/99)  BP(mean): --  RR: 18 (11 Oct 2021 22:00) (18 - 22)  SpO2: 99% (11 Oct 2021 22:00) (99% - 100%)    PHYSICAL EXAM:  GENERAL: NAD, well-developed  HEAD:  Atraumatic, normocephalic  EYES: EOMI, conjunctiva and sclera clear  NECK: Supple, no JVD  CHEST/LUNG: Clear to auscultation bilaterally; no wheezing or rales  HEART: Regular rate and rhythm; no murmurs  ABDOMEN: Soft, nontender, nondistended; bowel sounds present  EXTREMITIES:  2+ Peripheral Pulses, no edema  PSYCH: calm affect, not anxious  NEUROLOGY: non-focal, AAOx3  SKIN: No rashes or lesions  MUSCULOSKELETAL: no joint swelling or tenderness noted

## 2021-10-12 ENCOUNTER — TRANSCRIPTION ENCOUNTER (OUTPATIENT)
Age: 51
End: 2021-10-12

## 2021-10-12 DIAGNOSIS — Z90.49 ACQUIRED ABSENCE OF OTHER SPECIFIED PARTS OF DIGESTIVE TRACT: Chronic | ICD-10-CM

## 2021-10-12 DIAGNOSIS — I10 ESSENTIAL (PRIMARY) HYPERTENSION: ICD-10-CM

## 2021-10-12 DIAGNOSIS — Z29.9 ENCOUNTER FOR PROPHYLACTIC MEASURES, UNSPECIFIED: ICD-10-CM

## 2021-10-12 DIAGNOSIS — I21.4 NON-ST ELEVATION (NSTEMI) MYOCARDIAL INFARCTION: ICD-10-CM

## 2021-10-12 DIAGNOSIS — E78.2 MIXED HYPERLIPIDEMIA: ICD-10-CM

## 2021-10-12 DIAGNOSIS — E11.65 TYPE 2 DIABETES MELLITUS WITH HYPERGLYCEMIA: ICD-10-CM

## 2021-10-12 LAB
A1C WITH ESTIMATED AVERAGE GLUCOSE RESULT: 11.4 % — HIGH (ref 4–5.6)
ANION GAP SERPL CALC-SCNC: 12 MMOL/L — SIGNIFICANT CHANGE UP (ref 5–17)
APTT BLD: 85.2 SEC — HIGH (ref 27.5–35.5)
BUN SERPL-MCNC: 12 MG/DL — SIGNIFICANT CHANGE UP (ref 7–23)
CALCIUM SERPL-MCNC: 9.4 MG/DL — SIGNIFICANT CHANGE UP (ref 8.4–10.5)
CHLORIDE SERPL-SCNC: 102 MMOL/L — SIGNIFICANT CHANGE UP (ref 96–108)
CHOLEST SERPL-MCNC: 191 MG/DL — SIGNIFICANT CHANGE UP
CK MB BLD-MCNC: 4 % — HIGH (ref 0–3.5)
CK MB BLD-MCNC: 5.8 % — HIGH (ref 0–3.5)
CK MB CFR SERPL CALC: 4 NG/ML — HIGH (ref 0–3.8)
CK MB CFR SERPL CALC: 8.1 NG/ML — HIGH (ref 0–3.8)
CK SERPL-CCNC: 101 U/L — SIGNIFICANT CHANGE UP (ref 25–170)
CK SERPL-CCNC: 140 U/L — SIGNIFICANT CHANGE UP (ref 25–170)
CO2 SERPL-SCNC: 25 MMOL/L — SIGNIFICANT CHANGE UP (ref 22–31)
CREAT SERPL-MCNC: 0.57 MG/DL — SIGNIFICANT CHANGE UP (ref 0.5–1.3)
ESTIMATED AVERAGE GLUCOSE: 280 MG/DL — HIGH (ref 68–114)
GLUCOSE BLDC GLUCOMTR-MCNC: 202 MG/DL — HIGH (ref 70–99)
GLUCOSE BLDC GLUCOMTR-MCNC: 237 MG/DL — HIGH (ref 70–99)
GLUCOSE BLDC GLUCOMTR-MCNC: 266 MG/DL — HIGH (ref 70–99)
GLUCOSE BLDC GLUCOMTR-MCNC: 273 MG/DL — HIGH (ref 70–99)
GLUCOSE BLDC GLUCOMTR-MCNC: 293 MG/DL — HIGH (ref 70–99)
GLUCOSE SERPL-MCNC: 292 MG/DL — HIGH (ref 70–99)
HCT VFR BLD CALC: 39 % — SIGNIFICANT CHANGE UP (ref 34.5–45)
HDLC SERPL-MCNC: 50 MG/DL — LOW
HGB BLD-MCNC: 11.1 G/DL — LOW (ref 11.5–15.5)
LIPID PNL WITH DIRECT LDL SERPL: 109 MG/DL — HIGH
MAGNESIUM SERPL-MCNC: 2 MG/DL — SIGNIFICANT CHANGE UP (ref 1.6–2.6)
MCHC RBC-ENTMCNC: 21.5 PG — LOW (ref 27–34)
MCHC RBC-ENTMCNC: 28.5 GM/DL — LOW (ref 32–36)
MCV RBC AUTO: 75.4 FL — LOW (ref 80–100)
NON HDL CHOLESTEROL: 141 MG/DL — HIGH
NRBC # BLD: 0 /100 WBCS — SIGNIFICANT CHANGE UP (ref 0–0)
PHOSPHATE SERPL-MCNC: 2.9 MG/DL — SIGNIFICANT CHANGE UP (ref 2.5–4.5)
PLATELET # BLD AUTO: 224 K/UL — SIGNIFICANT CHANGE UP (ref 150–400)
POTASSIUM SERPL-MCNC: 4.3 MMOL/L — SIGNIFICANT CHANGE UP (ref 3.5–5.3)
POTASSIUM SERPL-SCNC: 4.3 MMOL/L — SIGNIFICANT CHANGE UP (ref 3.5–5.3)
RBC # BLD: 5.17 M/UL — SIGNIFICANT CHANGE UP (ref 3.8–5.2)
RBC # FLD: 13.7 % — SIGNIFICANT CHANGE UP (ref 10.3–14.5)
SARS-COV-2 RNA SPEC QL NAA+PROBE: SIGNIFICANT CHANGE UP
SODIUM SERPL-SCNC: 139 MMOL/L — SIGNIFICANT CHANGE UP (ref 135–145)
TRIGL SERPL-MCNC: 158 MG/DL — HIGH
TROPONIN T, HIGH SENSITIVITY RESULT: 114 NG/L — HIGH (ref 0–51)
TROPONIN T, HIGH SENSITIVITY RESULT: 79 NG/L — HIGH (ref 0–51)
TSH SERPL-MCNC: 2.23 UIU/ML — SIGNIFICANT CHANGE UP (ref 0.27–4.2)
WBC # BLD: 6.12 K/UL — SIGNIFICANT CHANGE UP (ref 3.8–10.5)
WBC # FLD AUTO: 6.12 K/UL — SIGNIFICANT CHANGE UP (ref 3.8–10.5)

## 2021-10-12 PROCEDURE — 99152 MOD SED SAME PHYS/QHP 5/>YRS: CPT

## 2021-10-12 PROCEDURE — 92978 ENDOLUMINL IVUS OCT C 1ST: CPT | Mod: 26,LD

## 2021-10-12 PROCEDURE — 99223 1ST HOSP IP/OBS HIGH 75: CPT

## 2021-10-12 PROCEDURE — 92928 PRQ TCAT PLMT NTRAC ST 1 LES: CPT | Mod: LD

## 2021-10-12 PROCEDURE — 93010 ELECTROCARDIOGRAM REPORT: CPT

## 2021-10-12 PROCEDURE — 99233 SBSQ HOSP IP/OBS HIGH 50: CPT

## 2021-10-12 PROCEDURE — 93458 L HRT ARTERY/VENTRICLE ANGIO: CPT | Mod: 26,59

## 2021-10-12 PROCEDURE — 93306 TTE W/DOPPLER COMPLETE: CPT | Mod: 26

## 2021-10-12 RX ORDER — ASPIRIN/CALCIUM CARB/MAGNESIUM 324 MG
81 TABLET ORAL DAILY
Refills: 0 | Status: DISCONTINUED | OUTPATIENT
Start: 2021-10-12 | End: 2021-10-13

## 2021-10-12 RX ORDER — ATORVASTATIN CALCIUM 80 MG/1
80 TABLET, FILM COATED ORAL AT BEDTIME
Refills: 0 | Status: DISCONTINUED | OUTPATIENT
Start: 2021-10-12 | End: 2021-10-13

## 2021-10-12 RX ORDER — TICAGRELOR 90 MG/1
1 TABLET ORAL
Qty: 60 | Refills: 0
Start: 2021-10-12 | End: 2021-11-10

## 2021-10-12 RX ORDER — SEMAGLUTIDE 0.68 MG/ML
0.5 INJECTION, SOLUTION SUBCUTANEOUS
Qty: 1 | Refills: 0
Start: 2021-10-12 | End: 2021-11-10

## 2021-10-12 RX ORDER — LANOLIN ALCOHOL/MO/W.PET/CERES
3 CREAM (GRAM) TOPICAL AT BEDTIME
Refills: 0 | Status: DISCONTINUED | OUTPATIENT
Start: 2021-10-12 | End: 2021-10-13

## 2021-10-12 RX ORDER — SODIUM CHLORIDE 9 MG/ML
1000 INJECTION, SOLUTION INTRAVENOUS
Refills: 0 | Status: DISCONTINUED | OUTPATIENT
Start: 2021-10-12 | End: 2021-10-13

## 2021-10-12 RX ORDER — INSULIN GLARGINE 100 [IU]/ML
20 INJECTION, SOLUTION SUBCUTANEOUS
Qty: 1 | Refills: 0
Start: 2021-10-12 | End: 2021-11-10

## 2021-10-12 RX ORDER — ATORVASTATIN CALCIUM 80 MG/1
1 TABLET, FILM COATED ORAL
Qty: 30 | Refills: 3
Start: 2021-10-12 | End: 2022-02-08

## 2021-10-12 RX ORDER — ASPIRIN/CALCIUM CARB/MAGNESIUM 324 MG
1 TABLET ORAL
Qty: 0 | Refills: 0 | DISCHARGE
Start: 2021-10-12

## 2021-10-12 RX ORDER — ISOPROPYL ALCOHOL, BENZOCAINE .7; .06 ML/ML; ML/ML
1 SWAB TOPICAL
Qty: 100 | Refills: 1
Start: 2021-10-12 | End: 2021-11-30

## 2021-10-12 RX ORDER — DEXTROSE 50 % IN WATER 50 %
25 SYRINGE (ML) INTRAVENOUS ONCE
Refills: 0 | Status: DISCONTINUED | OUTPATIENT
Start: 2021-10-12 | End: 2021-10-13

## 2021-10-12 RX ORDER — INFLUENZA VIRUS VACCINE 15; 15; 15; 15 UG/.5ML; UG/.5ML; UG/.5ML; UG/.5ML
0.5 SUSPENSION INTRAMUSCULAR ONCE
Refills: 0 | Status: COMPLETED | OUTPATIENT
Start: 2021-10-12 | End: 2021-10-12

## 2021-10-12 RX ORDER — SODIUM CHLORIDE 9 MG/ML
1000 INJECTION INTRAMUSCULAR; INTRAVENOUS; SUBCUTANEOUS
Refills: 0 | Status: DISCONTINUED | OUTPATIENT
Start: 2021-10-12 | End: 2021-10-13

## 2021-10-12 RX ORDER — ACETAMINOPHEN 500 MG
650 TABLET ORAL EVERY 6 HOURS
Refills: 0 | Status: DISCONTINUED | OUTPATIENT
Start: 2021-10-12 | End: 2021-10-13

## 2021-10-12 RX ORDER — GLUCAGON INJECTION, SOLUTION 0.5 MG/.1ML
1 INJECTION, SOLUTION SUBCUTANEOUS ONCE
Refills: 0 | Status: DISCONTINUED | OUTPATIENT
Start: 2021-10-12 | End: 2021-10-13

## 2021-10-12 RX ORDER — INSULIN GLARGINE 100 [IU]/ML
24 INJECTION, SOLUTION SUBCUTANEOUS AT BEDTIME
Refills: 0 | Status: DISCONTINUED | OUTPATIENT
Start: 2021-10-12 | End: 2021-10-13

## 2021-10-12 RX ORDER — INSULIN LISPRO 100/ML
VIAL (ML) SUBCUTANEOUS AT BEDTIME
Refills: 0 | Status: DISCONTINUED | OUTPATIENT
Start: 2021-10-12 | End: 2021-10-13

## 2021-10-12 RX ORDER — TICAGRELOR 90 MG/1
90 TABLET ORAL EVERY 12 HOURS
Refills: 0 | Status: DISCONTINUED | OUTPATIENT
Start: 2021-10-12 | End: 2021-10-13

## 2021-10-12 RX ORDER — DEXTROSE 50 % IN WATER 50 %
15 SYRINGE (ML) INTRAVENOUS ONCE
Refills: 0 | Status: DISCONTINUED | OUTPATIENT
Start: 2021-10-12 | End: 2021-10-13

## 2021-10-12 RX ORDER — INSULIN LISPRO 100/ML
8 VIAL (ML) SUBCUTANEOUS
Refills: 0 | Status: DISCONTINUED | OUTPATIENT
Start: 2021-10-12 | End: 2021-10-13

## 2021-10-12 RX ORDER — ONDANSETRON 8 MG/1
4 TABLET, FILM COATED ORAL EVERY 8 HOURS
Refills: 0 | Status: DISCONTINUED | OUTPATIENT
Start: 2021-10-12 | End: 2021-10-13

## 2021-10-12 RX ORDER — INSULIN LISPRO 100/ML
VIAL (ML) SUBCUTANEOUS
Refills: 0 | Status: DISCONTINUED | OUTPATIENT
Start: 2021-10-12 | End: 2021-10-13

## 2021-10-12 RX ORDER — DEXTROSE 50 % IN WATER 50 %
12.5 SYRINGE (ML) INTRAVENOUS ONCE
Refills: 0 | Status: DISCONTINUED | OUTPATIENT
Start: 2021-10-12 | End: 2021-10-13

## 2021-10-12 RX ORDER — METOPROLOL TARTRATE 50 MG
25 TABLET ORAL DAILY
Refills: 0 | Status: DISCONTINUED | OUTPATIENT
Start: 2021-10-12 | End: 2021-10-13

## 2021-10-12 RX ADMIN — Medication 3 MILLIGRAM(S): at 21:42

## 2021-10-12 RX ADMIN — Medication 8 UNIT(S): at 16:33

## 2021-10-12 RX ADMIN — SODIUM CHLORIDE 100 MILLILITER(S): 9 INJECTION INTRAMUSCULAR; INTRAVENOUS; SUBCUTANEOUS at 11:05

## 2021-10-12 RX ADMIN — TICAGRELOR 180 MILLIGRAM(S): 90 TABLET ORAL at 02:13

## 2021-10-12 RX ADMIN — INSULIN GLARGINE 24 UNIT(S): 100 INJECTION, SOLUTION SUBCUTANEOUS at 21:42

## 2021-10-12 RX ADMIN — Medication 81 MILLIGRAM(S): at 05:55

## 2021-10-12 RX ADMIN — TICAGRELOR 90 MILLIGRAM(S): 90 TABLET ORAL at 16:17

## 2021-10-12 RX ADMIN — ATORVASTATIN CALCIUM 80 MILLIGRAM(S): 80 TABLET, FILM COATED ORAL at 21:42

## 2021-10-12 RX ADMIN — Medication 3: at 11:30

## 2021-10-12 RX ADMIN — HEPARIN SODIUM 0 UNIT(S)/HR: 5000 INJECTION INTRAVENOUS; SUBCUTANEOUS at 06:03

## 2021-10-12 RX ADMIN — Medication 2: at 16:17

## 2021-10-12 RX ADMIN — HEPARIN SODIUM 800 UNIT(S)/HR: 5000 INJECTION INTRAVENOUS; SUBCUTANEOUS at 07:15

## 2021-10-12 NOTE — DISCHARGE NOTE PROVIDER - NSDCCPTREATMENT_GEN_ALL_CORE_FT
PRINCIPAL PROCEDURE  Procedure: Left heart cardiac cath  Findings and Treatment: No heavy lifting for 2 weeks, no strenuous activity  ( pushing/ pulling) no driving for x 2 days,  you may shower 24 hours following procedure but no bathing or swimming for x1  week, no strenuous sex for x 1 week & follow up with your cardiologist in 1-2 week  aspirin and brilinta do not stop these meds unless directed by cardiologist         PRINCIPAL PROCEDURE  Procedure: Left heart cardiac cath  Findings and Treatment: one stent to the prox LAD         PRINCIPAL PROCEDURE  Procedure: Left heart cardiac cath  Findings and Treatment: one stent to the prox LADNo heavy lifting for 2 weeks, no strenuous activity  ( pushing/ pulling) no driving for x 2 days,  you may shower 24 hours following procedure but no bathing or swimming for x1  week, no strenuous sex for x 1 week & follow up with your cardiologist in 1-2 week

## 2021-10-12 NOTE — DISCHARGE NOTE PROVIDER - HOSPITAL COURSE
HPI:  51F with PMH of T2DM on metformin p/w CP. 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 CP is midsternal, 10/10, felt like "something sitting on her chest," lasted approx 10 minutes before starting to improve slowly; had associated R arm numbness and heaviness and mild diaphoresis, no associated SOB, lightheadedness, palpitations nausea/vomiting. No exertional or positional component to the pain. Pt recalls she had very mild burning CP lasting a few minutes the night prior which self resolved. Prior to these episodes, had never had pain like this in the past. No personal history of CAD, no smoking history; no family history of CAD in 1st degree members. Currently CP free.     Had otherwise been in usual state of health - denies fevers, chills, abd pain, cough,  or GI symptoms, no LE swelling or pain. Rest of ROS negative.  (11 Oct 2021 23:57)   HPI:  51F with PMH of T2DM on metformin p/w CP. Pt is PCA at 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 CP is midsternal, 10/10, felt like "something sitting on her chest," lasted approx 10 minutes before starting to improve slowly; had associated R arm numbness and heaviness and mild diaphoresis, no associated SOB, lightheadedness, palpitations nausea/vomiting. No exertional or positional component to the pain. Pt recalls she had very mild burning CP lasting a few minutes the night prior which self resolved. Prior to these episodes, had never had pain like this in the past. No personal history of CAD, no smoking history; no family history of CAD in 1st degree members. Currently CP free.   Had otherwise been in usual state of health - denies fevers, chills, abd pain, cough,  or GI symptoms, no LE swelling or pain. Rest of ROS negative.  (11 Oct 2021 23:57).    10/12 Cardiac cath with one stent to the prox LAD. Right radial site without swelling, bleeding.   51F with PMH of T2DM on metformin p/w sudden onset CP, a/w NSTEMI now resolved     S/p PCI with MONI to mLAD  TTE with Ef 50-55%, mild LVSF  Cont ASA and brilinta  F/u cardiology Re: GDMT  Cont lipitor  no events on tele  found to have Poorly controlled; A1C >11  Endocrine consult appreciated, medication sent to the pharmacy 91$ copay patient payed out of pocket will follow up with endocrinology and Cardiology in 1 week   She is medically stable for d/c home.

## 2021-10-12 NOTE — DISCHARGE NOTE PROVIDER - CARE PROVIDERS DIRECT ADDRESSES
,[email or web address] ,[email or web address],[email or web address]

## 2021-10-12 NOTE — DISCHARGE NOTE PROVIDER - CARE PROVIDER_API CALL
Kentrell Reeves)  EndocrinologyMetabDiabetes; Internal Medicine  [address], 1st [place] [address]  Phone: [phone]  Fax: [phone]  Follow Up Time:    Kentrell Reeves)  EndocrinologyMetabDiabetes; Internal Medicine  [address], [address]st [place] [address]  Phone: [phone]  Fax: [phone]  Follow Up Time:     Dmitriy Ugalde)  Cardiology; Internal Medicine; Interventional Cardiology  [address] [place] [address]  Phone: [phone]  Fax: [phone]  Follow Up Time: 2 weeks   Kentrell Reeves)  EndocrinologyMetabDiabetes; Internal Medicine  [address], [address]st [place] [address]  Phone: [phone]  Fax: [phone]  Follow Up Time:     Dmitriy Ugalde)  Cardiology; Internal Medicine; Interventional Cardiology  [address] [place] [address]  Phone: [phone]  Fax: [phone]  Follow Up Time: 2 weeks    Joslyn Carmona ()  EndocrinologyMetabDiabetes; Internal Medicine  [address] [place] [address]  Phone: [phone]  Fax: [phone]  Follow Up Time:

## 2021-10-12 NOTE — DISCHARGE NOTE PROVIDER - NSDCMRMEDTOKEN_GEN_ALL_CORE_FT
alcohol swabs : Apply topically to affected area 4 times a day   aspirin 81 mg oral tablet, chewable: 1 tab(s) orally once a day  atorvastatin 80 mg oral tablet: 1 tab(s) orally once a day (at bedtime)  Basaglar KwikPen 100 units/mL subcutaneous solution: 20 unit(s) subcutaneous once a day (at bedtime) MDD:1  glucometer (per patient&#x27;s insurance): Test blood sugars four times a day. Dispense #1 glucometer.  glucose tablets: Follow instructions on bottle when sugar is low.  Insulin Pen Needles, 4mm: 1 application subcutaneously 4 times a day. ** Use with insulin pen **   lancets: 1 application subcutaneously 4 times a day   metFORMIN 1000 mg oral tablet: 1 tab(s) orally 2 times a day  Ozempic 2 mg/1.5 mL (0.25 mg or 0.5 mg dose) subcutaneous solution: 0.5 milligram(s) subcutaneously once a week   test strips (per patient&#x27;s insurance): 1 application subcutaneously 4 times a day. ** Compatible with patient&#x27;s glucometer **  ticagrelor 90 mg oral tablet: 1 tab(s) orally every 12 hours   alcohol swabs : Apply topically to affected area 4 times a day   aspirin 81 mg oral tablet, chewable: 1 tab(s) orally once a day  atorvastatin 80 mg oral tablet: 1 tab(s) orally once a day (at bedtime)  Basaglar KwikPen 100 units/mL subcutaneous solution: 20 unit(s) subcutaneous once a day (at bedtime) MDD:1  glucometer (per patient&#x27;s insurance): Test blood sugars four times a day. Dispense #1 glucometer.  glucose tablets: Follow instructions on bottle when sugar is low.  Insulin Pen Needles, 4mm: 1 application subcutaneously 4 times a day. ** Use with insulin pen **   lancets: 1 application subcutaneously 4 times a day   metFORMIN 1000 mg oral tablet: 1 tab(s) orally 2 times a day  metoprolol succinate 25 mg oral tablet, extended release: 1 tab(s) orally once a day  Ozempic 2 mg/1.5 mL (0.25 mg or 0.5 mg dose) subcutaneous solution: 0.5 milligram(s) subcutaneously once a week   test strips (per patient&#x27;s insurance): 1 application subcutaneously 4 times a day. ** Compatible with patient&#x27;s glucometer **  ticagrelor 90 mg oral tablet: 1 tab(s) orally every 12 hours   alcohol swabs : Apply topically to affected area 4 times a day   aspirin 81 mg oral tablet, chewable: 1 tab(s) orally once a day  atorvastatin 80 mg oral tablet: 1 tab(s) orally once a day (at bedtime)  Basaglar KwikPen 100 units/mL subcutaneous solution: 30 unit(s) subcutaneous once a day (at bedtime) MDD:1   glucometer (per patient&#x27;s insurance): Test blood sugars four times a day. Dispense #1 glucometer.  glucose tablets: Follow instructions on bottle when sugar is low.  Insulin Pen Needles, 4mm: 1 application subcutaneously 4 times a day. ** Use with insulin pen **   lancets: 1 application subcutaneously 4 times a day   metFORMIN 1000 mg oral tablet: 1 tab(s) orally 2 times a day  metoprolol succinate 25 mg oral tablet, extended release: 1 tab(s) orally once a day  Ozempic 2 mg/1.5 mL (0.25 mg or 0.5 mg dose) subcutaneous solution: 0.5 milligram(s) subcutaneously once a week   test strips (per patient&#x27;s insurance): 1 application subcutaneously 4 times a day. ** Compatible with patient&#x27;s glucometer **  ticagrelor 90 mg oral tablet: 1 tab(s) orally every 12 hours

## 2021-10-12 NOTE — DISCHARGE NOTE PROVIDER - NSDCCPCAREPLAN_GEN_ALL_CORE_FT
PRINCIPAL DISCHARGE DIAGNOSIS  Diagnosis: NSTEMI (non-ST elevation myocardial infarction)  Assessment and Plan of Treatment: Low salt, low fat diet.   Weight management.   Take medications as prescribed.    No smoking.  Follow up appointments with your doctor(s)  as instruced.         PRINCIPAL DISCHARGE DIAGNOSIS  Diagnosis: NSTEMI (non-ST elevation myocardial infarction)  Assessment and Plan of Treatment: Do not stop you Aspirin or Brilinta unless instructed to do so by your cardiologist, they help keep your stented arteries open.   No heavy lifting or pushing/pulling with procedure arm for 2 weeks. No driving for 2 days. You may shower 24 hours following the procedure but avoid baths/swimming for 1 week. Check your wrist site for bleeding and/or swelling daily following procedure and call your doctor immediately if it occurs or if you experience increased pain at the site. Follow up with your cardiologist in 1-2 weeks. You may call [facility] if you have any questions/concerns regarding your procedure [phone].      SECONDARY DISCHARGE DIAGNOSES  Diagnosis: HTN (hypertension)  Assessment and Plan of Treatment: Continue with your blood pressure medications; eat a heart healthy diet with low salt diet; exercise regularly (consult with your physician or cardiologist first); maintain a heart healthy weight; if you smoke - quit (A resource to help you stop smoking is the [facility] [employer] for Direct Hit Control – phone number [phone].); include healthy ways to manage stress. Continue to follow with your primary care physician or cardiologist.    Diagnosis: HLD (hyperlipidemia)  Assessment and Plan of Treatment: Continue with your cholesterol medications. Eat a heart healthy diet that is low in saturated fats and salt, and includes whole grains, fruits, vegetables and lean protein; exercise regularly (consult with your physician or cardiologist first); maintain a heart healthy weight; if you smoke - quit (A resource to help you stop smoking is the [facility] [employer] for Tobacco Control – phone number [phone].). Continue to follow with your primary physician or cardiologist.

## 2021-10-12 NOTE — CONSULT NOTE ADULT - PROBLEM SELECTOR RECOMMENDATION 9
-Goal -180 and she is above goal as she is only on correctional scale. Start Lantus 24 units sq qhs and admelog 8 units sq tid-ac  -Moderate scale tid-ac/qhs  -RD consult  -Insulin pen teaching  -DISPO: patient does not want 4 shots of insulin/day, so will do Lantus 20 units sq qhs, ozempic 0.25mg sq qweekly x 4 weeks (no hx of retinopathy), and metformin 1000mg po bid  -f/u with Dr. Floyd in [place], endocrine  -outpt CGM - [facility] insurance covers [employer] not [place]

## 2021-10-12 NOTE — CONSULT NOTE ADULT - ASSESSMENT
50 yo female with T2D uncontrolled (HbA1c 11.4%) with no known previous complications here with 1 days of chest pain found to have a NSTEMI. Endocrine consulted for poorly controlled diabetes. 52 yo female with T2D uncontrolled (HbA1c 11.4%) with no known previous complications here with 1 days of chest pain found to have a NSTEMI s/p 1 stent in the LAD. Endocrine consulted for poorly controlled diabetes.

## 2021-10-12 NOTE — DISCHARGE NOTE PROVIDER - PROVIDER TOKENS
PROVIDER:[TOKEN:[[id number]]] PROVIDER:[TOKEN:[[id number]]],PROVIDER:[TOKEN:[[id number]],FOLLOWUP:[2 weeks]] PROVIDER:[TOKEN:[[id number]]],PROVIDER:[TOKEN:[[id number]],FOLLOWUP:[2 weeks]],PROVIDER:[TOKEN:[[id number]]]

## 2021-10-12 NOTE — DISCHARGE NOTE PROVIDER - NSRESEARCHGRANT_MLMHIDDEN_GEN_A_CORE
"Problem: Patient Care Overview  Goal: Plan of Care Review  Outcome: Ongoing (interventions implemented as appropriate)  Patient is alert and oriented. Able to make needs known. Patient transferred from PACU at 1400. Left chest tube remains to water seal. No leak noted. Chest tube output was 130 ml. Patient has a left and right forearm PIV. Both are patent and flush appropriately. Patient is on a regular diet but had a minimal appetite, only taking bites of his food and sips of drinks. Continuous oxygen is set at 2 LPM via NC. Patient had c/o pain in left chest. PRN Dilaudid and Percocet given. Patient states the pain medications have had no effect on him at all and states that his pain is a "15 out of 10". Patient has been encouraged to keep his head of the bed up at a decent level to promote draining of the chest tube and to help him breathe better and expand his lungs. Wife will be staying with him in his room tonight to try to keep him comfortable. Continue to monitor.      " yes

## 2021-10-12 NOTE — PROGRESS NOTE ADULT - SUBJECTIVE AND OBJECTIVE BOX
[facility] Division of Hospital Medicine  Syl Cuellar MD  Pager (SHAJI-ANGEL, 2R-2T): [phone]  Other Times:  [phone]    Patient is a 51y old  Female who presents with a chief complaint of chest pain (11 Oct 2021 23:57)      SUBJECTIVE / OVERNIGHT EVENTS: Seen at bedside after PCI. Denies chest pain or SOB.  ADDITIONAL REVIEW OF SYSTEMS: Negative    MEDICATIONS  (STANDING):  aspirin  chewable 81 milliGRAM(s) Oral daily  atorvastatin 80 milliGRAM(s) Oral at bedtime  dextrose 40% Gel 15 Gram(s) Oral once  dextrose 5%. 1000 milliLiter(s) (50 mL/Hr) IV Continuous <Continuous>  dextrose 5%. 1000 milliLiter(s) (100 mL/Hr) IV Continuous <Continuous>  dextrose 50% Injectable 25 Gram(s) IV Push once  dextrose 50% Injectable 12.5 Gram(s) IV Push once  dextrose 50% Injectable 25 Gram(s) IV Push once  glucagon  Injectable 1 milliGRAM(s) IntraMuscular once  insulin lispro (ADMELOG) corrective regimen sliding scale   SubCutaneous three times a day before meals  insulin lispro (ADMELOG) corrective regimen sliding scale   SubCutaneous at bedtime  sodium chloride 0.9%. 1000 milliLiter(s) (100 mL/Hr) IV Continuous <Continuous>  ticagrelor 90 milliGRAM(s) Oral every 12 hours    MEDICATIONS  (PRN):  acetaminophen   Tablet .. 650 milliGRAM(s) Oral every 6 hours PRN Temp greater or equal to 38C (100.4F), Mild Pain (1 - 3)  aluminum hydroxide/magnesium hydroxide/simethicone Suspension 30 milliLiter(s) Oral every 4 hours PRN Dyspepsia  melatonin 3 milliGRAM(s) Oral at bedtime PRN Insomnia  ondansetron Injectable 4 milliGRAM(s) IV Push every 8 hours PRN Nausea and/or Vomiting      CAPILLARY BLOOD GLUCOSE      POCT Blood Glucose.: 266 mg/dL (12 Oct 2021 11:04)  POCT Blood Glucose.: 273 mg/dL (12 Oct 2021 07:24)  POCT Blood Glucose.: 293 mg/dL (12 Oct 2021 01:59)    I&O's Summary    12 Oct 2021 07:01  -  12 Oct 2021 12:02  --------------------------------------------------------  IN: 0 mL / OUT: 0 mL / NET: 0 mL        PHYSICAL EXAM:  Vital Signs Last 24 Hrs  T(C): 36.7 (12 Oct 2021 10:40), Max: 36.8 (11 Oct 2021 20:29)  T(F): 98.1 (12 Oct 2021 10:40), Max: 98.2 (11 Oct 2021 20:29)  HR: 82 (12 Oct 2021 11:40) (73 - 130)  BP: 126/84 (12 Oct 2021 11:40) (122/76 - 157/99)  BP(mean): 98 (12 Oct 2021 11:40) (82 - 99)  RR: 18 (12 Oct 2021 11:40) (18 - 22)  SpO2: 100% (12 Oct 2021 11:40) (97% - 100%)    CONSTITUTIONAL: NAD, well-developed, well-groomed  EYES: PERRLA; conjunctiva and sclera clear  ENMT: Moist oral mucosa, no pharyngeal injection or exudates; normal dentition  NECK: Supple, no palpable masses; no thyromegaly  RESPIRATORY: Normal respiratory effort; lungs are clear to auscultation bilaterally  CARDIOVASCULAR: Regular rate and rhythm, normal S1 and S2, no murmur/rub/gallop; No lower extremity edema; Peripheral pulses are 2+ bilaterally  ABDOMEN: Nontender to palpation, normoactive bowel sounds, no rebound/guarding; No hepatosplenomegaly  MUSCULOSKELETAL:  No clubbing or cyanosis of digits; no joint swelling or tenderness to palpation  PSYCH: A+O to person, place, and time; affect appropriate  NEUROLOGY: CN 2-12 are intact and symmetric; no gross sensory deficits   SKIN: No rashes; no palpable lesions; access site intact with no bleeding    LABS:                        11.1   6.12  )-----------( 224      ( 12 Oct 2021 05:44 )             39.0     10-12    139  |  102  |  12  ----------------------------<  292<H>  4.3   |  25  |  0.57    Ca    9.4      12 Oct 2021 05:44  Phos  2.9     10-12  Mg     2.0     10-12    TPro  7.5  /  Alb  4.5  /  TBili  0.1<L>  /  DBili  x   /  AST  16  /  ALT  16  /  AlkPhos  118  10-11    PT/INR - ( 11 Oct 2021 20:37 )   PT: 11.9 sec;   INR: 0.99 ratio         PTT - ( 12 Oct 2021 05:44 )  PTT:85.2 sec  CARDIAC MARKERS ( 12 Oct 2021 05:44 )  x     / x     / 140 U/L / x     / 8.1 ng/mL  CARDIAC MARKERS ( 12 Oct 2021 00:47 )  x     / x     / 101 U/L / x     / 4.0 ng/mL            RADIOLOGY & ADDITIONAL TESTS:  Results Reviewed:   Imaging Personally Reviewed:  Electrocardiogram Personally Reviewed:    COORDINATION OF CARE:  Care Discussed with Consultants/Other Providers [Y/N]:  Prior or Outpatient Records Reviewed [Y/N]:

## 2021-10-12 NOTE — CONSULT NOTE ADULT - SUBJECTIVE AND OBJECTIVE BOX
HPI:  51F with PMH of T2DM on metformin p/w CP. 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 CP is midsternal, 10/10, felt like "something sitting on her chest," lasted approx 10 minutes before starting to improve slowly; had associated R arm numbness and heaviness and mild diaphoresis, no associated SOB, lightheadedness, palpitations nausea/vomiting. No exertional or positional component to the pain. Pt recalls she had very mild burning CP lasting a few minutes the night prior which self resolved. Prior to these episodes, had never had pain like this in the past. No personal history of CAD, no smoking history; no family history of CAD in 1st degree members. Currently CP free.     Had otherwise been in usual state of health - denies fevers, chills, abd pain, cough,  or GI symptoms, no LE swelling or pain. Rest of ROS negative.  (11 Oct 2021 23:57)      PAST MEDICAL & SURGICAL HISTORY:  Diabetes    S/P cholecystectomy        FAMILY HISTORY:  No pertinent family history in first degree relatives        Social History:  Tobacco  ETOH  Illicits  Occupation    Outpatient Medications:    MEDICATIONS  (STANDING):  aspirin  chewable 81 milliGRAM(s) Oral daily  atorvastatin 80 milliGRAM(s) Oral at bedtime  dextrose 40% Gel 15 Gram(s) Oral once  dextrose 5%. 1000 milliLiter(s) (50 mL/Hr) IV Continuous <Continuous>  dextrose 5%. 1000 milliLiter(s) (100 mL/Hr) IV Continuous <Continuous>  dextrose 50% Injectable 25 Gram(s) IV Push once  dextrose 50% Injectable 12.5 Gram(s) IV Push once  dextrose 50% Injectable 25 Gram(s) IV Push once  glucagon  Injectable 1 milliGRAM(s) IntraMuscular once  insulin glargine Injectable (LANTUS) 24 Unit(s) SubCutaneous at bedtime  insulin lispro (ADMELOG) corrective regimen sliding scale   SubCutaneous three times a day before meals  insulin lispro (ADMELOG) corrective regimen sliding scale   SubCutaneous at bedtime  insulin lispro Injectable (ADMELOG) 8 Unit(s) SubCutaneous three times a day before meals  metoprolol succinate ER 25 milliGRAM(s) Oral daily  sodium chloride 0.9%. 1000 milliLiter(s) (100 mL/Hr) IV Continuous <Continuous>  ticagrelor 90 milliGRAM(s) Oral every 12 hours    MEDICATIONS  (PRN):  acetaminophen   Tablet .. 650 milliGRAM(s) Oral every 6 hours PRN Temp greater or equal to 38C (100.4F), Mild Pain (1 - 3)  aluminum hydroxide/magnesium hydroxide/simethicone Suspension 30 milliLiter(s) Oral every 4 hours PRN Dyspepsia  melatonin 3 milliGRAM(s) Oral at bedtime PRN Insomnia  ondansetron Injectable 4 milliGRAM(s) IV Push every 8 hours PRN Nausea and/or Vomiting      Allergies    No Known Allergies    Intolerances      Review of Systems:  Constitutional: No fever, good appetite/po intake  Eyes: No blurry vision, diplopia  Neuro: No tremors  HEENT: No pain  Cardiovascular: No chest pain, palpitations  Respiratory: No SOB, no cough  GI: No nausea, vomiting,   : No dysuria, hematuria  Skin: no rash  Psych: no depression  Endocrine: no polyuria, polydipsia  Hem/lymph: no swelling  Osteoporosis: no fractures    ALL OTHER SYSTEMS REVIEWED AND NEGATIVE    UNABLE TO OBTAIN    PHYSICAL EXAM:  VITALS: T(C): 36.7 (10-12-21 @ 10:40)  T(F): 98.1 (10-12-21 @ 10:40), Max: 98.2 (10-11-21 @ 20:29)  HR: 82 (10-12-21 @ 16:10) (73 - 130)  BP: 133/86 (10-12-21 @ 16:10) (122/76 - 157/99)  RR:  (18 - 22)  SpO2:  (97% - 100%)  Wt(kg): --  GENERAL: NAD, well-groomed, well-developed  EYES: No proptosis, no lid lag, anicteric  HEENT:  Atraumatic, Normocephalic, moist mucous membranes  THYROID: Normal size, no palpable nodules  RESPIRATORY: Clear to auscultation bilaterally; No rales, rhonchi, wheezing, or rubs  CARDIOVASCULAR: Regular rate and rhythm; No murmurs; no peripheral edema  GI: Soft, nontender, non distended, normal bowel sounds  SKIN: Dry, intact, No rashes or lesions  NEURO: sensation intact, extraocular movements intact, no tremor, normal reflexes  PSYCH: reactive affect, euthymic mood  CUSHING'S SIGNS: no striae    POCT Blood Glucose.: 237 mg/dL (10-12-21 @ 15:53)  POCT Blood Glucose.: 266 mg/dL (10-12-21 @ 11:04)  POCT Blood Glucose.: 273 mg/dL (10-12-21 @ 07:24)  POCT Blood Glucose.: 293 mg/dL (10-12-21 @ 01:59)                            11.1   6.12  )-----------( 224      ( 12 Oct 2021 05:44 )             39.0       10-12    139  |  102  |  12  ----------------------------<  292<H>  4.3   |  25  |  0.57    EGFR if : 125  EGFR if non : 108    Ca    9.4      10-12  Mg     2.0     10-12  Phos  2.9     10-12    TPro  7.5  /  Alb  4.5  /  TBili  0.1<L>  /  DBili  x   /  AST  16  /  ALT  16  /  AlkPhos  118  10-11      Thyroid Function Tests:  10-12 @ 09:02 TSH 2.23 FreeT4 -- T3 -- Anti TPO -- Anti Thyroglobulin Ab -- TSI --          10-12 Chol 191 Direct LDL -- LDL calculated 109<H> HDL 50<L> Trig 158<H>    Radiology:     A/P: yo male/female with hx of ................................... here with ..........         HPI: 50 yo female with T2D uncontrolled (HbA1c 11.4%) with no known previous complications here with 1 days of chest pain found to have a NSTEMI. The pain begin while she was at work in her sternum: 10/10 sharp with a pressure, no associated SOB or dizziness.   She sees her PCP for her diabetes. She admits that she does not test. She wanted a Preston but [employer] insurance will not cover it. Her last MARGA was 1 year ago nd she was told that she "was ok". No hx of retinopathy, glaucoma or cataracts.  She got her Pfizer x2 and is due for a booster at [facility] on Friday.      PAST MEDICAL & SURGICAL HISTORY:  Type 2 Diabetes  S/P cholecystectomy      FAMILY HISTORY:  Denies diabetes    Social History:  Tobacco: denies  ETOH: denies  Occupation: PCA at [employer]    Outpatient Medications: Metformin 1000mg po bid    MEDICATIONS  (STANDING):  aspirin  chewable 81 milliGRAM(s) Oral daily  atorvastatin 80 milliGRAM(s) Oral at bedtime  dextrose 40% Gel 15 Gram(s) Oral once  dextrose 5%. 1000 milliLiter(s) (50 mL/Hr) IV Continuous <Continuous>  dextrose 5%. 1000 milliLiter(s) (100 mL/Hr) IV Continuous <Continuous>  dextrose 50% Injectable 25 Gram(s) IV Push once  dextrose 50% Injectable 12.5 Gram(s) IV Push once  dextrose 50% Injectable 25 Gram(s) IV Push once  glucagon  Injectable 1 milliGRAM(s) IntraMuscular once  insulin glargine Injectable (LANTUS) 24 Unit(s) SubCutaneous at bedtime  insulin lispro (ADMELOG) corrective regimen sliding scale   SubCutaneous three times a day before meals  insulin lispro (ADMELOG) corrective regimen sliding scale   SubCutaneous at bedtime  insulin lispro Injectable (ADMELOG) 8 Unit(s) SubCutaneous three times a day before meals  metoprolol succinate ER 25 milliGRAM(s) Oral daily  sodium chloride 0.9%. 1000 milliLiter(s) (100 mL/Hr) IV Continuous <Continuous>  ticagrelor 90 milliGRAM(s) Oral every 12 hours    MEDICATIONS  (PRN):  acetaminophen   Tablet .. 650 milliGRAM(s) Oral every 6 hours PRN Temp greater or equal to 38C (100.4F), Mild Pain (1 - 3)  aluminum hydroxide/magnesium hydroxide/simethicone Suspension 30 milliLiter(s) Oral every 4 hours PRN Dyspepsia  melatonin 3 milliGRAM(s) Oral at bedtime PRN Insomnia  ondansetron Injectable 4 milliGRAM(s) IV Push every 8 hours PRN Nausea and/or Vomiting      Allergies    No Known Allergies    Intolerances      Review of Systems:  Constitutional: No fever/chills  Eyes: No blurry vision, diplopia  Neuro: No neuropathy, +HA  Cardiovascular: +chest pain, no palpitations  Respiratory: No SOB, no cough  GI: No nausea, vomiting,   : No dysuria, +frequency  Endocrine: +hot flushes, +monthly menses  ALL OTHER SYSTEMS REVIEWED AND NEGATIVE      PHYSICAL EXAM:  VITALS: T(C): 36.7 (10-12-21 @ 10:40)  T(F): 98.1 (10-12-21 @ 10:40), Max: 98.2 (10-11-21 @ 20:29)  HR: 82 (10-12-21 @ 16:10) (73 - 130)  BP: 133/86 (10-12-21 @ 16:10) (122/76 - 157/99)  RR:  (18 - 22)  SpO2:  (97% - 100%)  Wt(kg): --  GENERAL: NAD, well-groomed, well-developed  EYES: No proptosis, anicteric  HEENT:  Atraumatic, Normocephalic, moist mucous membranes  THYROID: Normal size, no palpable nodules  RESPIRATORY: Clear to auscultation bilaterally; No rales, rhonchi, wheezing, or rubs  CARDIOVASCULAR: Regular rate and rhythm; No murmurs; no peripheral edema  GI: Soft, nontender, non distended, normal bowel sounds  SKIN: Dry, intact, No rashes or lesions on feet b/l  PSYCH: reactive affect, euthymic mood      POCT Blood Glucose.: 237 mg/dL (10-12-21 @ 15:53)  POCT Blood Glucose.: 266 mg/dL (10-12-21 @ 11:04)  POCT Blood Glucose.: 273 mg/dL (10-12-21 @ 07:24)  POCT Blood Glucose.: 293 mg/dL (10-12-21 @ 01:59)                            11.1   6.12  )-----------( 224      ( 12 Oct 2021 05:44 )             39.0       10-12    139  |  102  |  12  ----------------------------<  292<H>  4.3   |  25  |  0.57    EGFR if : 125  EGFR if non : 108    Ca    9.4      10-12  Mg     2.0     10-12  Phos  2.9     10-12    TPro  7.5  /  Alb  4.5  /  TBili  0.1<L>  /  DBili  x   /  AST  16  /  ALT  16  /  AlkPhos  118  10-11      Thyroid Function Tests:  10-12 @ 09:02 TSH 2.23       10-12 Chol 191 Direct LDL -- LDL calculated 109<H> HDL 50<L> Trig 158<H>

## 2021-10-12 NOTE — CONSULT NOTE ADULT - PROBLEM SELECTOR RECOMMENDATION 3
-continue atorvastatin 80mg po qhs  -discussed with NP Maggy Serrano MD  Endocrinology Attending  Mondays and Tuesdays 9am-6pm: [phone] (pager)  Other days, night and weekend: [phone]

## 2021-10-13 ENCOUNTER — TRANSCRIPTION ENCOUNTER (OUTPATIENT)
Age: 51
End: 2021-10-13

## 2021-10-13 VITALS
SYSTOLIC BLOOD PRESSURE: 129 MMHG | TEMPERATURE: 99 F | RESPIRATION RATE: 18 BRPM | DIASTOLIC BLOOD PRESSURE: 86 MMHG | OXYGEN SATURATION: 98 % | HEART RATE: 86 BPM

## 2021-10-13 LAB
GLUCOSE BLDC GLUCOMTR-MCNC: 245 MG/DL — HIGH (ref 70–99)
GLUCOSE BLDC GLUCOMTR-MCNC: 247 MG/DL — HIGH (ref 70–99)

## 2021-10-13 PROCEDURE — 99232 SBSQ HOSP IP/OBS MODERATE 35: CPT

## 2021-10-13 PROCEDURE — 99239 HOSP IP/OBS DSCHRG MGMT >30: CPT

## 2021-10-13 RX ORDER — METOPROLOL TARTRATE 50 MG
1 TABLET ORAL
Qty: 30 | Refills: 0
Start: 2021-10-13 | End: 2021-11-11

## 2021-10-13 RX ORDER — INSULIN GLARGINE 100 [IU]/ML
30 INJECTION, SOLUTION SUBCUTANEOUS AT BEDTIME
Refills: 0 | Status: DISCONTINUED | OUTPATIENT
Start: 2021-10-13 | End: 2021-10-13

## 2021-10-13 RX ORDER — INSULIN LISPRO 100/ML
10 VIAL (ML) SUBCUTANEOUS
Refills: 0 | Status: DISCONTINUED | OUTPATIENT
Start: 2021-10-13 | End: 2021-10-13

## 2021-10-13 RX ORDER — INSULIN GLARGINE 100 [IU]/ML
30 INJECTION, SOLUTION SUBCUTANEOUS
Qty: 1 | Refills: 0
Start: 2021-10-13 | End: 2021-11-11

## 2021-10-13 RX ADMIN — Medication 2: at 07:14

## 2021-10-13 RX ADMIN — Medication 8 UNIT(S): at 07:14

## 2021-10-13 RX ADMIN — Medication 81 MILLIGRAM(S): at 06:04

## 2021-10-13 RX ADMIN — Medication 10 UNIT(S): at 11:24

## 2021-10-13 RX ADMIN — Medication 2: at 11:25

## 2021-10-13 RX ADMIN — Medication 25 MILLIGRAM(S): at 06:05

## 2021-10-13 RX ADMIN — TICAGRELOR 90 MILLIGRAM(S): 90 TABLET ORAL at 06:05

## 2021-10-13 NOTE — DISCHARGE NOTE NURSING/CASE MANAGEMENT/SOCIAL WORK - PATIENT PORTAL LINK FT
You can access the FollowMyHealth Patient Portal offered by [facility] by registering at the following website: [email or web address]. By joining [facility]’s FollowMyHealth portal, you will also be able to view your health information using other applications (apps) compatible with our system.

## 2021-10-13 NOTE — PROGRESS NOTE ADULT - PROBLEM SELECTOR PLAN 2
-goal, less than 130/80. Pt above goal, defer to primary team to further titrate her metoprolol.
Poorly controlled; A1C >11  Hold home metformin while inpatient; ISS  Endocrine consult for discharge recs

## 2021-10-13 NOTE — PROGRESS NOTE ADULT - PROBLEM SELECTOR PLAN 1
S/p PCI with MONI to mLAD  TTE with Ef 50-55%, mild LVSF  Cont ASA and brilinta  F/u cardiology Re: GDMT  Cont lipitor  Monitor on tele
-test BG AC/HS  -Increase Lantus 30 units QHS  -Increase Admelog 10 units AC meals  -c/w Admelog low correction scale AC and Low HS scale  -RD consult  -Insulin pen teaching  -DISPO: patient does not want 4 shots of insulin/day, so will do Lantus 20 units sq qhs, ozempic 0.25mg sq qweekly x 4 weeks (no hx of retinopathy), and metformin 1000mg po bid  -f/u with Dr Carmona in our [place] office. [phone] will call for appointment today.   -outpt CGM - [employer] insurance covers DEXcom not Preston.

## 2021-10-13 NOTE — PROGRESS NOTE ADULT - SUBJECTIVE AND OBJECTIVE BOX
Diabetes Follow up note:    Chief complaint: T2DM    Interval Hx:     Review of Systems:  General:  GI: Tolerating POs. Denies N/V/D/Abd pain  CV: Denies CP/SOB  ENDO: No S&Sx of hypoglycemia    MEDS:  atorvastatin 80 milliGRAM(s) Oral at bedtime  insulin glargine Injectable (LANTUS) 24 Unit(s) SubCutaneous at bedtime  insulin lispro (ADMELOG) corrective regimen sliding scale   SubCutaneous three times a day before meals  insulin lispro (ADMELOG) corrective regimen sliding scale   SubCutaneous at bedtime  insulin lispro Injectable (ADMELOG) 8 Unit(s) SubCutaneous three times a day before meals      Allergies    No Known Allergies        PE:  General:  Vital Signs Last 24 Hrs  T(C): 36.9 (13 Oct 2021 08:58), Max: 36.9 (12 Oct 2021 19:05)  T(F): 98.5 (13 Oct 2021 08:58), Max: 98.5 (13 Oct 2021 08:58)  HR: 79 (13 Oct 2021 08:58) (73 - 93)  BP: 110/70 (13 Oct 2021 08:58) (110/70 - 146/77)  BP(mean): 83 (13 Oct 2021 08:58) (83 - 106)  RR: 18 (13 Oct 2021 08:58) (18 - 18)  SpO2: 99% (13 Oct 2021 08:58) (97% - 100%)  Abd: Soft, NT,ND,   Extremities: Warm  Neuro: A&O X3    LABS:  POCT Blood Glucose.: 245 mg/dL (10-13-21 @ 07:05)  POCT Blood Glucose.: 202 mg/dL (10-12-21 @ 21:01)  POCT Blood Glucose.: 237 mg/dL (10-12-21 @ 15:53)  POCT Blood Glucose.: 266 mg/dL (10-12-21 @ 11:04)  POCT Blood Glucose.: 273 mg/dL (10-12-21 @ 07:24)  POCT Blood Glucose.: 293 mg/dL (10-12-21 @ 01:59)                            11.1   6.12  )-----------( 224      ( 12 Oct 2021 05:44 )             39.0       10-12    139  |  102  |  12  ----------------------------<  292<H>  4.3   |  25  |  0.57    Ca    9.4      12 Oct 2021 05:44  Phos  2.9     10-12  Mg     2.0     10-12    TPro  7.5  /  Alb  4.5  /  TBili  0.1<L>  /  DBili  x   /  AST  16  /  ALT  16  /  AlkPhos  118  10-11      Thyroid Function Tests:  10-12 @ 09:02 TSH 2.23 FreeT4 -- T3 -- Anti TPO -- Anti Thyroglobulin Ab -- TSI --      A1C with Estimated Average Glucose Result: 11.4 % (10-12-21 @ 08:53)          Contact number: deniz [phone] or [phone]       Diabetes Follow up note:    Chief complaint: T2DM    Interval Hx: BG values remain in 200s. Pt seen at bedside. Reports feeling better, readying for discharge home. Feels motivated to improve her diabetes care to prevent further complications. Has practiced self-injecting but will need to practice with insulin pen w/RN prior to discharge today. Pt resides in [place] therefore can follow up in our [place] office.     Review of Systems:  General: denies pain  GI: Tolerating POs. Denies N/V/D/Abd pain  CV: Denies CP/SOB  ENDO: No S&Sx of hypoglycemia    MEDS:  atorvastatin 80 milliGRAM(s) Oral at bedtime  insulin glargine Injectable (LANTUS) 24 Unit(s) SubCutaneous at bedtime  insulin lispro (ADMELOG) corrective regimen sliding scale   SubCutaneous three times a day before meals  insulin lispro (ADMELOG) corrective regimen sliding scale   SubCutaneous at bedtime  insulin lispro Injectable (ADMELOG) 8 Unit(s) SubCutaneous three times a day before meals      Allergies    No Known Allergies        PE:  General: Female lying in bed. NAD.   Vital Signs Last 24 Hrs  T(C): 36.9 (13 Oct 2021 08:58), Max: 36.9 (12 Oct 2021 19:05)  T(F): 98.5 (13 Oct 2021 08:58), Max: 98.5 (13 Oct 2021 08:58)  HR: 79 (13 Oct 2021 08:58) (73 - 93)  BP: 110/70 (13 Oct 2021 08:58) (110/70 - 146/77)  BP(mean): 83 (13 Oct 2021 08:58) (83 - 106)  RR: 18 (13 Oct 2021 08:58) (18 - 18)  SpO2: 99% (13 Oct 2021 08:58) (97% - 100%)  CV: S1, S2. NSR on monitor.   Abd: Soft, NT,ND, Obese.   Extremities: Warm. no edema x 4 ext.   Neuro: A&O X3    LABS:  POCT Blood Glucose.: 245 mg/dL (10-13-21 @ 07:05)  POCT Blood Glucose.: 202 mg/dL (10-12-21 @ 21:01)  POCT Blood Glucose.: 237 mg/dL (10-12-21 @ 15:53)  POCT Blood Glucose.: 266 mg/dL (10-12-21 @ 11:04)  POCT Blood Glucose.: 273 mg/dL (10-12-21 @ 07:24)  POCT Blood Glucose.: 293 mg/dL (10-12-21 @ 01:59)                            11.1   6.12  )-----------( 224      ( 12 Oct 2021 05:44 )             39.0       10-12    139  |  102  |  12  ----------------------------<  292<H>  4.3   |  25  |  0.57    Ca    9.4      12 Oct 2021 05:44  Phos  2.9     10-12  Mg     2.0     10-12    TPro  7.5  /  Alb  4.5  /  TBili  0.1<L>  /  DBili  x   /  AST  16  /  ALT  16  /  AlkPhos  118  10-11      Thyroid Function Tests:  10-12 @ 09:02 TSH 2.23 FreeT4 -- T3 -- Anti TPO -- Anti Thyroglobulin Ab -- TSI --      A1C with Estimated Average Glucose Result: 11.4 % (10-12-21 @ 08:53)          Contact number: deniz [phone] or [phone]

## 2021-10-13 NOTE — PROGRESS NOTE ADULT - SUBJECTIVE AND OBJECTIVE BOX
Patient seen and examined at bedside.    Overnight Events: YESICAEON    Review Of Systems: No chest pain, shortness of breath, or palpitations            Current Meds:  acetaminophen   Tablet .. 650 milliGRAM(s) Oral every 6 hours PRN  aluminum hydroxide/magnesium hydroxide/simethicone Suspension 30 milliLiter(s) Oral every 4 hours PRN  aspirin  chewable 81 milliGRAM(s) Oral daily  atorvastatin 80 milliGRAM(s) Oral at bedtime  dextrose 40% Gel 15 Gram(s) Oral once  dextrose 5%. 1000 milliLiter(s) IV Continuous <Continuous>  dextrose 5%. 1000 milliLiter(s) IV Continuous <Continuous>  dextrose 50% Injectable 25 Gram(s) IV Push once  dextrose 50% Injectable 12.5 Gram(s) IV Push once  dextrose 50% Injectable 25 Gram(s) IV Push once  glucagon  Injectable 1 milliGRAM(s) IntraMuscular once  insulin glargine Injectable (LANTUS) 30 Unit(s) SubCutaneous at bedtime  insulin lispro (ADMELOG) corrective regimen sliding scale   SubCutaneous three times a day before meals  insulin lispro (ADMELOG) corrective regimen sliding scale   SubCutaneous at bedtime  insulin lispro Injectable (ADMELOG) 10 Unit(s) SubCutaneous three times a day before meals  melatonin 3 milliGRAM(s) Oral at bedtime PRN  metoprolol succinate ER 25 milliGRAM(s) Oral daily  ondansetron Injectable 4 milliGRAM(s) IV Push every 8 hours PRN  sodium chloride 0.9%. 1000 milliLiter(s) IV Continuous <Continuous>  ticagrelor 90 milliGRAM(s) Oral every 12 hours      Vitals:  T(F): 98.5 (10-13), Max: 98.5 (10-13)  HR: 79 (10-13) (75 - 93)  BP: 110/70 (10-13) (110/70 - 139/78)  RR: 18 (10-13)  SpO2: 99% (10-13)  I&O's Summary    12 Oct 2021 07:01  -  13 Oct 2021 07:00  --------------------------------------------------------  IN: 480 mL / OUT: 0 mL / NET: 480 mL    13 Oct 2021 07:01  -  13 Oct 2021 11:38  --------------------------------------------------------  IN: 240 mL / OUT: 0 mL / NET: 240 mL        Physical Exam:  Appearance: No acute distress; well appearing  Eyes: PERRL, EOMI, pink conjunctiva  HEENT: Normal oral mucosa  Cardiovascular: RRR, S1, S2, no murmurs, rubs, or gallops; no edema; no JVD  Respiratory: Clear to auscultation bilaterally  Gastrointestinal: soft, non-tender, non-distended with normal bowel sounds  Musculoskeletal: No clubbing; no joint deformity   Neurologic: Non-focal  Lymphatic: No lymphadenopathy  Psychiatry: AAOx3, mood & affect appropriate  Skin: No rashes, ecchymoses, or cyanosis                          11.1   6.12  )-----------( 224      ( 12 Oct 2021 05:44 )             39.0     10-12    139  |  102  |  12  ----------------------------<  292<H>  4.3   |  25  |  0.57    Ca    9.4      12 Oct 2021 05:44  Phos  2.9     10-12  Mg     2.0     10-12    TPro  7.5  /  Alb  4.5  /  TBili  0.1<L>  /  DBili  x   /  AST  16  /  ALT  16  /  AlkPhos  118  10-11    PT/INR - ( 11 Oct 2021 20:37 )   PT: 11.9 sec;   INR: 0.99 ratio         PTT - ( 12 Oct 2021 05:44 )  PTT:85.2 sec  CARDIAC MARKERS ( 12 Oct 2021 05:44 )  114 ng/L / x     / x     / 140 U/L / x     / 8.1 ng/mL  CARDIAC MARKERS ( 12 Oct 2021 00:47 )  79 ng/L / x     / x     / 101 U/L / x     / 4.0 ng/mL  CARDIAC MARKERS ( 11 Oct 2021 23:07 )  49 ng/L / x     / x     / x     / x     / x      CARDIAC MARKERS ( 11 Oct 2021 20:37 )  9 ng/L / x     / x     / x     / x     / x          Serum Pro-Brain Natriuretic Peptide: 119 pg/mL (10-11 @ 20:37)          New ECG(s): Personally reviewed    < from: TTE with Doppler (w/Cont) (10.12.21 @ 07:39) >  1. Normal left ventricular internal dimensions and wall  thicknesses.  2. Mild segmental left ventricular systolic dysfunction.  Endocardial visualization enhanced with intravenous  injection of Ultrasonic Enhancing Agent (Definity). No left  ventricular thrombus.  3. Mild diastolic dysfunction (Stage I).  4. Normal right ventricular size and function.    < end of copied text >

## 2021-10-13 NOTE — PROGRESS NOTE ADULT - PROBLEM SELECTOR PLAN 3
Lovenox
-continue atorvastatin 80mg po qhs    discussed w/pt and team  pager: [phone]   office:  [phone] (M-F 9a-5pm)               [phone] (nights/weekends)

## 2021-10-13 NOTE — CHART NOTE - NSCHARTNOTEFT_GEN_A_CORE
RD education chart note    Patient was visited by RD for education. Provided verbal and written education on heart healthy nutrition for people with diabetes. Emphasis on pairing carbohydrates with protein for glycemic control; portion sizes; choosing whole grains vs refined carbohydrates; limiting saturated fat + sodium intake. Provided Heart-Healthy Consistent Carbohydrate Nutrition Therapy. Good comprehension noted. Pt made aware RD to remain available for any questions.     Patti Escobar, MS, RD, CDN Pager #[phone]

## 2021-10-13 NOTE — CHART NOTE - NSCHARTNOTEFT_GEN_A_CORE
51F with PMH of T2DM on metformin p/w sudden onset CP, a/w NSTEMI now resolved     S/p PCI with MONI to mLAD  TTE with Ef 50-55%, mild LVSF  Cont ASA and brilinta  F/u cardiology Re: GDMT  Cont lipitor  no events on tele  found to have Poorly controlled; A1C >11  Endocrine consult appreciated, medication sent to the pharmacy 91$ copay patient payed out of pocket will follow up with endocrinology and Cardiolgo in 1 week   Time spent 35 min. medically stable for d/c home

## 2021-10-13 NOTE — PROGRESS NOTE ADULT - ASSESSMENT
51F with PMH of T2DM on metformin p/w sudden onset CP, a/w NSTEMI.   
52yo F with history of DM on metformin only who presents with chest pain and NSTEMI now s/p LHC with mLAD MONI.     Plan:  - c/w DAPT  - c/w atorvastatin 80 daily  - c/w toprol 25 daily   - start lisinopril 10 daily   - f/u in cards clinic within 1 week of discharge  - appreciate endocrine recs re a1c of 11.4    Cardiology will sign off.
Statement Selected
52 yo female with T2D uncontrolled (HbA1c 11.4%) with no known previous complications here with 1 days of chest pain found to have a NSTEMI s/p 1 stent in the LAD. Endocrine consulted for poorly controlled diabetes. Tolerating POs. Discussed diet and medication modifications to improve glycemic control to prevent further DM complications. Discharge planning for today. BG goal (100-180mg/dl)    Met with patient and reviewed the following:    -A1c LEVEL  -Blood glucose goals  -Glucose monitoring frequency  -Hypoglycemia prevention,detection and treatment  -Insulin(s) action, time of administration and side effects  -Importance of follow up care

## 2021-10-13 NOTE — PROGRESS NOTE ADULT - NUTRITIONAL ASSESSMENT
Diet, DASH/TLC:   Sodium & Cholesterol Restricted  Consistent Carbohydrate {No Snacks} (CSTCHO) (10-12-21 @ 10:59) [Active]    Needs RD consult

## 2021-10-13 NOTE — DISCHARGE NOTE NURSING/CASE MANAGEMENT/SOCIAL WORK - NSDCVIVACCINE_GEN_ALL_CORE_FT
Tdap; 11-Apr-2021 17:41; Supa Collier (RN); [employer]; [id number] (Exp. Date: 01-Oct-2022); IntraMuscular; Deltoid Left.; 0.5 milliLiter(s); VIS (VIS Published: 09-May-2013, VIS Presented: 11-Apr-2021);

## 2021-10-13 NOTE — PROGRESS NOTE ADULT - TIME BILLING
development of plan of care/coordination of care/glycemic control/discharge planning through review of labs, blood glucose values and vital signs.

## 2021-10-19 ENCOUNTER — APPOINTMENT (OUTPATIENT)
Dept: ENDOCRINOLOGY | Facility: CLINIC | Age: 51
End: 2021-10-19
Payer: COMMERCIAL

## 2021-10-19 VITALS
SYSTOLIC BLOOD PRESSURE: 102 MMHG | TEMPERATURE: 98.1 F | OXYGEN SATURATION: 98 % | DIASTOLIC BLOOD PRESSURE: 77 MMHG | BODY MASS INDEX: 33.43 KG/M2 | WEIGHT: 208 LBS | HEIGHT: 66 IN | HEART RATE: 88 BPM

## 2021-10-19 DIAGNOSIS — E78.2 MIXED HYPERLIPIDEMIA: ICD-10-CM

## 2021-10-19 DIAGNOSIS — I25.2 OLD MYOCARDIAL INFARCTION: ICD-10-CM

## 2021-10-19 PROCEDURE — 99215 OFFICE O/P EST HI 40 MIN: CPT

## 2021-10-19 PROCEDURE — 99205 OFFICE O/P NEW HI 60 MIN: CPT

## 2021-10-19 RX ORDER — ASPIRIN 81 MG/1
81 TABLET, CHEWABLE ORAL DAILY
Qty: 90 | Refills: 3 | Status: ACTIVE | COMMUNITY
Start: 2021-10-19

## 2021-10-19 RX ORDER — ATORVASTATIN CALCIUM 80 MG/1
80 TABLET, FILM COATED ORAL DAILY
Qty: 90 | Refills: 1 | Status: ACTIVE | COMMUNITY
Start: 2021-10-19

## 2021-10-19 NOTE — ASSESSMENT
[Diabetes Foot Care] : diabetes foot care [Importance of Diet and Exercise] : importance of diet and exercise to improve glycemic control, achieve weight loss and improve cardiovascular health [Hypoglycemia Management] : hypoglycemia management [Self Monitoring of Blood Glucose] : self monitoring of blood glucose [Retinopathy Screening] : Patient was referred to ophthalmology for retinopathy screening [Weight Loss] : weight loss [Action and use of Insulin] : action and use of short and long-acting insulin [Insulin Self-Administration] : insulin self-administration [Injection Technique, Storage, Sharps Disposal] : injection technique, storage, and sharps disposal [FreeTextEntry1] : 1. Type 2 DM, uncontrolled\par Recent hemoglobin A1c of 11.4% (10/12/21 as per inpatient labs on [facility] EHR), not at goal.\par Recently admitted in Oct 2021 for NSTEMI s/p PCI, no known history of retinopathy or neuropathy.\par Patient not taking metformin BID as instructed by hospital discharge team\par Review of blood glucose monitoring at home shows persistently elevated fasting and postprandial hyperglycemia; discussed importance of increasing Lantus dose, however patient declines at this moment\par Instructed patient to take Lantus 30U qhs, and to increase by 2U if morning glucose noted persistently above 180mg/dl this week, to which patient agreed.\par Instructed patient to take Metformin 1000mg BID as previously instructed.\par Continue Ozempic 0.25mg SQ weekly, will increase to 0.5mg dose in 1 month as per titration schedule. \par Extensive conversation about lifestyle modifications including weight loss and diet were discussed as well as importance of adherence to medication regimen\par Again discussed proper administration of insulin and ozempic, instructions on hypoglycemia symptoms and management were discussed. \par Reminded patient to continue yearly diabetic screenings with both opthalmology and podiatry. \par Will prescribe DEXCOM G6 CGM to patient's pharmacy.\par Will repeat CMP and patient urged to bring all medications and glucose monitoring log with her at next clinic visit; patient verbalized understanding of all the above. \par \par 2. Obesity \par BMI 33\par Discussed importance of weight loss and diet modifications and sleep hygiene with patient\par Also discussed importance of medication compliance and the benefits of GLP-1 agonist.\par \par 3. HLD\par Continue atorvastatin 80mg daily. \par \par RTC in 2-3 weeks.

## 2021-10-19 NOTE — REVIEW OF SYSTEMS
[Decreased Appetite] : decreased appetite [Dry Skin] : dry skin [As Noted in HPI] : as noted in HPI [Fatigue] : no fatigue [Recent Weight Gain (___ Lbs)] : no recent weight gain [Recent Weight Loss (___ Lbs)] : no recent weight loss [Fever] : no fever [Chills] : no chills [Dry Eyes] : no dryness [Eye Pain] : no pain [Blurred Vision] : no blurred vision [Redness] : no redness  [Dysphagia] : no dysphagia [Neck Pain] : no neck pain [Dysphonia] : no dysphonia [Chest Pain] : no chest pain [Leg Claudication] : no leg claudication [Palpitations] : no palpitations [Fast Heart Rate] : heart rate is not fast [Shortness Of Breath] : no shortness of breath [Cough] : no cough [Orthopnea] : no orthopnea [SOB on Exertion] : no shortness of breath on exertion [Nausea] : no nausea [Constipation] : no constipation [Abdominal Pain] : no abdominal pain [Vomiting] : no vomiting [Diarrhea] : no diarrhea [Polyuria] : no polyuria [Irregular Menses] : regular menses [Acne] : no acne [Hirsutism] : no hirsutism [Hair Loss] : no hair loss

## 2021-10-19 NOTE — HISTORY OF PRESENT ILLNESS
[FreeTextEntry1] : GAVIN SANDOVAL  is a 52 yo female with past medical history of Type 2 DM, h/o NSTEM s/p PCI w/ MONI in mLAD (in 10/2021) who presents to clinic today for management of diabetes.\par \par Patient states she was at work last week when she noted sudden onset of chest pain with associated pressure. She became hospitalized and found to have NSTEMI, during that admission, underwent PCI with drug eluting stent in mLAD. She was seen by inpatient endocrinology for diabetes management. Previously patient notes she was diagnosed with diabetes about 13 years with no noted complications and was only taking metformin.\par During this past hospitalization, patient was found to have A1c of 11.4%, and started on insulin therapy. Patient was offered diabetic regimen of multiple daily injections of both basal-bolus insulin upon discharged, however patient declined. She was discharged on 10/13/21 with Lantus insulin 30U qhs, Metformin increased to 1000mg po BID, and also started on Ozempic 0.25 mg SQ weekly.\par \par Since this admission, patient noted she is taking Lantus as prescribed, feels comfortable with injections now. 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 She notes she sleeps around 3-4am as a result. She is taking ozempic weekly as prescribed and noted loss of appetite since her discharge. She however is only taking metformin 1000mg once daily (instead of BID dosing as instructed upon discharge).\par She forgot to bring in her medications today, and unsure of insulin names however she remembers the doses. She notes she checks glucose 3x/day. AM fasting glucose ranges from 187-220mg/dl, prelunch (12:30pm-1:10p): 180-200, and bedtime/evening (10pm): 174-218mg/dl. Patient denies recent hypoglycemic episodes or symptoms. She notes she also has glucose tablets in the event of a hypoglycemic episode.\par She denies any prior retinopathy, nephropathy or neuropathy. She is due for annual podiatry visit and states she is making appointment and has appt for opthalmology for annual screening in Dec 2021. She denies any polyuria, polydispia or any recent in her weight. \par LMP 9/17/21, menstrual cycles are regular with normal flow noted.\par \par PMH: as noted above\par PSH: cholecystectomy in 2009, tubal ligation. \par Family Hx: no h/o DM or thyroid disease\par Social Hx: denies ETOH, tobacco, or any illicit drug use. employed at [employer]. , 3 kids \par ALL: NKDA\par Home Meds: Lantus 30U SQ qhs, Ozempic 0.25mg SQ weekly, Metformin 1000mg BID, Lipitor 80mg daily, toprol 25mg, asa 81mg daily, brillinta, vitamin D otc\par \par Labs from inpatient admission from 10/12/2021: (obtained from [facility]):\par Hemoglobin A1c 11.4%, Lipid panel: Chol 191, HDL 50, , . \par \par Patient pharmacy: [facility] ([address] [place] [address])

## 2021-10-19 NOTE — PHYSICAL EXAM
[Alert] : alert [Obese] : obese [No Acute Distress] : no acute distress [Normal Sclera/Conjunctiva] : normal sclera/conjunctiva [EOMI] : extra ocular movement intact [No Proptosis] : no proptosis [No Lid Lag] : no lid lag [No LAD] : no lymphadenopathy [Supple] : the neck was supple [Thyroid Not Enlarged] : the thyroid was not enlarged [No Thyroid Nodules] : no palpable thyroid nodules [No Respiratory Distress] : no respiratory distress [Clear to Auscultation] : lungs were clear to auscultation bilaterally [Normal S1, S2] : normal S1 and S2 [No Murmurs] : no murmurs [Normal Rate] : heart rate was normal [Regular Rhythm] : with a regular rhythm [Normal Bowel Sounds] : normal bowel sounds [Not Tender] : non-tender [Not Distended] : not distended [Soft] : abdomen soft [No Stigmata of Cushings Syndrome] : no stigmata of Cushings Syndrome [Normal Gait] : normal gait [No Clubbing, Cyanosis] : no clubbing  or cyanosis of the fingernails [No Rash] : no rash [Acanthosis Nigricans] : acanthosis nigricans present [Right foot was examined, including] : right foot ~C was examined, including visual inspection with sensory and pulse exams [Left foot was examined, including] : left foot ~C was examined, including visual inspection with sensory and pulse exams [Normal] : normal [Normal Sensation on Monofilament Testing] : normal sensation on monofilament testing of lower extremities [Oriented x3] : oriented to person, place, and time [Abdominal Striae] : no abdominal striae [Foot Ulcers] : no foot ulcers [Acne] : no acne [Hirsutism] : no hirsutism

## 2021-10-28 ENCOUNTER — NON-APPOINTMENT (OUTPATIENT)
Age: 51
End: 2021-10-28

## 2021-10-28 ENCOUNTER — APPOINTMENT (OUTPATIENT)
Dept: CARDIOLOGY | Facility: CLINIC | Age: 51
End: 2021-10-28
Payer: COMMERCIAL

## 2021-10-28 VITALS
DIASTOLIC BLOOD PRESSURE: 76 MMHG | HEART RATE: 85 BPM | OXYGEN SATURATION: 99 % | HEIGHT: 66 IN | WEIGHT: 208 LBS | BODY MASS INDEX: 33.43 KG/M2 | SYSTOLIC BLOOD PRESSURE: 124 MMHG

## 2021-10-28 DIAGNOSIS — E11.9 TYPE 2 DIABETES MELLITUS W/OUT COMPLICATIONS: ICD-10-CM

## 2021-10-28 DIAGNOSIS — Z79.4 TYPE 2 DIABETES MELLITUS W/OUT COMPLICATIONS: ICD-10-CM

## 2021-10-28 PROCEDURE — 99214 OFFICE O/P EST MOD 30 MIN: CPT

## 2021-10-28 PROCEDURE — 93000 ELECTROCARDIOGRAM COMPLETE: CPT

## 2021-10-28 NOTE — REASON FOR VISIT
[Structural Heart and Valve Disease] : structural heart and valve disease [Hyperlipidemia] : hyperlipidemia [Hypertension] : hypertension [FreeTextEntry1] : 51-year-old female with a past medical history significant for\par \par Coronary artery disease s/p NSTEMI/PCI (mLAD)\par Type 2 diabetes mellitus (hemoglobin A1c 11.4%, Metformin)\par Varicose veins of bilateral lower extremities\par Cholecystectomy\par Stab phlebectomy\par Tubal ligation bilaterally\par Pre-menopause\par \par Since she was in the hospital she is doing better.  Has had initially a few episodes when she felt that something was sticking her that occurred a few days after the procedure.  When she ambulates no chest pain/tightness/discomfort, light-headed sensation, dizziness or palpations.  No blood in her stool or urine.  No fevers, chills or sweats.  Prior to the episodes she had severe anxiety with chest pain that has now resolved when she gets anxious.  Ms. Phoenix ambulates 2.5 miles in the morning.  Notes significant changes in her diet.  Has a decrease in appetite and has cut portion sizes.  Eating more vegetables.  Cutting out fried foods.   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  She presented on October 11, 2021 with a NSTEMI.  She underwent cardiac catheterization and found to have severe obstructive disease of the left anterior descending artery.  Middle left anterior descending artery drug-eluting stent was inserted.  The patient tolerated the procedure well.  No discomfort in her wrist.\par \par Overall she is now doing well with no cardiopulmonary complaints at rest or upon exertion.  Taking all her medications as prescribed.\par \par --Status post drug-eluting stent/Cy on October 12, 2021.  Radial approach.  LVEDP 12 mmHg.  No aortic valve stenosis.  Normal left main with right dominant system.\par --Continue aspirin 81 mg daily.\par --Continue ticagrelor 90 mg every 12 hours (for at least one year).  Signs and symptoms patient were reviewed.  Avoid all NSAIDs.  May take Tylenol for discomfort.\par --Continue metoprolol succinate 25 mg daily.\par --Recommend a repeat TTE in 6 months to 12 months.  Indications and details of study was gone over.\par --EKG done do to history of coronary artery disease/hypertension/dyslipidemia.\par --Continue atorvastatin 80 mg daily.\par --Continue Metformin and Ozempic.\par --The patient is morbidity obese (BMI).  Discussed strategies to lose weight and short and long term complications.\par --The patient is scheduled to go back to work on 11/12/2021.\par --It was reviewed with the patient the importance of following a heart healthy lifestyle. AHA/ACC guidelines stress the importance of lifestyle modifications to lower cardiovascular disease risk. This includes eating a heart-healthy diet (fresh fruits/vegetable, whole grains, limit intake of sweets, sugar-sweetened beverages, red meats). He was encouraged to perform regular aerobic exercises at least 30 minutes of moderate intensity exercise at least 4 times a week and to maintain a desirable body weight and avoid tobacco products or second hand exposure.\par \par All questions and concerns of the patient were addressed.  Follow-up 3 months at which time repeat CBC/CMP will be performed (if none done sooner).

## 2021-10-28 NOTE — PHYSICAL EXAM
[Well Developed] : well developed [Well Nourished] : well nourished [No Acute Distress] : no acute distress [Normal Conjunctiva] : normal conjunctiva [Normal Venous Pressure] : normal venous pressure [No Carotid Bruit] : no carotid bruit [Normal S1, S2] : normal S1, S2 [No Murmur] : no murmur [No Rub] : no rub [No Gallop] : no gallop [Clear Lung Fields] : clear lung fields [Good Air Entry] : good air entry [No Respiratory Distress] : no respiratory distress  [Soft] : abdomen soft [Non Tender] : non-tender [No Masses/organomegaly] : no masses/organomegaly [Normal Bowel Sounds] : normal bowel sounds [Normal Gait] : normal gait [No Edema] : no edema [No Cyanosis] : no cyanosis [No Clubbing] : no clubbing [No Varicosities] : no varicosities [No Rash] : no rash [No Skin Lesions] : no skin lesions [Moves all extremities] : moves all extremities [No Focal Deficits] : no focal deficits [Normal Speech] : normal speech [Alert and Oriented] : alert and oriented [Normal memory] : normal memory [de-identified] : Right radial - clean/dry/intact with no thrill

## 2021-10-29 DIAGNOSIS — Z95.5 PRESENCE OF CORONARY ANGIOPLASTY IMPLANT AND GRAFT: ICD-10-CM

## 2021-11-01 LAB
ANION GAP SERPL CALC-SCNC: 12 MMOL/L
BUN SERPL-MCNC: 9 MG/DL
CALCIUM SERPL-MCNC: 9.7 MG/DL
CHLORIDE SERPL-SCNC: 102 MMOL/L
CHOLEST SERPL-MCNC: 113 MG/DL
CO2 SERPL-SCNC: 24 MMOL/L
CREAT SERPL-MCNC: 0.67 MG/DL
CREAT SPEC-SCNC: 268 MG/DL
ESTIMATED AVERAGE GLUCOSE: 192 MG/DL
GLUCOSE SERPL-MCNC: 111 MG/DL
HBA1C MFR BLD HPLC: 8.3 %
HDLC SERPL-MCNC: 46 MG/DL
LDLC SERPL CALC-MCNC: 51 MG/DL
MICROALBUMIN 24H UR DL<=1MG/L-MCNC: 1.2 MG/DL
MICROALBUMIN/CREAT 24H UR-RTO: 4 MG/G
NONHDLC SERPL-MCNC: 67 MG/DL
POTASSIUM SERPL-SCNC: 3.9 MMOL/L
SODIUM SERPL-SCNC: 138 MMOL/L
TRIGL SERPL-MCNC: 82 MG/DL
TSH SERPL-ACNC: 2.36 UIU/ML

## 2021-11-02 ENCOUNTER — APPOINTMENT (OUTPATIENT)
Dept: ENDOCRINOLOGY | Facility: CLINIC | Age: 51
End: 2021-11-02
Payer: COMMERCIAL

## 2021-11-02 VITALS
WEIGHT: 209 LBS | DIASTOLIC BLOOD PRESSURE: 78 MMHG | OXYGEN SATURATION: 100 % | TEMPERATURE: 97.9 F | SYSTOLIC BLOOD PRESSURE: 110 MMHG | HEART RATE: 91 BPM | BODY MASS INDEX: 33.59 KG/M2 | HEIGHT: 66 IN

## 2021-11-02 PROCEDURE — 99213 OFFICE O/P EST LOW 20 MIN: CPT

## 2021-11-02 NOTE — ASSESSMENT
[Diabetes Foot Care] : diabetes foot care [Importance of Diet and Exercise] : importance of diet and exercise to improve glycemic control, achieve weight loss and improve cardiovascular health [Hypoglycemia Management] : hypoglycemia management [Action and use of Insulin] : action and use of short and long-acting insulin [Self Monitoring of Blood Glucose] : self monitoring of blood glucose [Insulin Self-Administration] : insulin self-administration [Injection Technique, Storage, Sharps Disposal] : injection technique, storage, and sharps disposal [Retinopathy Screening] : Patient was referred to ophthalmology for retinopathy screening [Weight Loss] : weight loss [FreeTextEntry1] : 1. Type 2 DM, uncontrolled\par Hemoglobin A1c improved from 11.4% to 8.3% since hospital discharge and initiation of new diabetic regimen at last endocrine visit, however target A1c is 7-8%.\par Recently hospitalization in Oct 2021 for NSTEMI s/p PCI, no known history of retinopathy or neuropathy.\par Review of fingerstick glucose monitoring shows no hypoglycemia, and glucose is in target ~82% of time.\par Patient notes decreased apetite and few lbs weight loss since on ozempic.\par Continue Lantus 30U qhs, Ozempic 0.5mg weekly, and Metformin 1000mg BID\par Explained indications, benefits and side effects of SGLT2i medications; patient agreed to start.\par Will start on Jardiance 10mg daily, informed patient initiation of Jardiance can help with postprandial control and both cardiac protective effect and may lead to decreased insulin requirements in future.\par Reminded patient to f/u yearly diabetic screenings with both opthalmology and podiatry appts. \par Patient verbalized understanding of all the above. \par \par RTC in 1 month.

## 2021-11-02 NOTE — HISTORY OF PRESENT ILLNESS
[FreeTextEntry1] : GAVIN SANDOVAL  is a 50 yo female with past medical history of Type 2 DM, h/o NSTEM s/p PCI w/ MONI in mLAD (in 10/2021) who presents to clinic today for f/u diabetes management. \par \par Patient was diagnosed with diabetes about 13 years ago, taking metformin at the time. However she was recently hospitalized in Oct 2021 with NSTEMI and had drug eluting stent placed and discharged on insulin therapy due to A1c of 11.4%. At the last endocrine visit on 10/19/2021 (days post discharge), continued Lantus 30U at bedtime, and increased Metformin to 1000mg po BID and continued Ozempic 0.5mg weekly. (Patient couldn't recall at last visit if Ozempic dose was 0.25 or 0.5mg weekly, but has clarified today that she is actually taking Ozempic 0.5mg weekly dose). Since then, she is tolerating her insulin and all other medications well; she notes she has occasional upset stomach with metformin, but taking it with meals, denies diarrhea.\par During this past hospitalization, patient was found to have A1c of 11.4%, and started on insulin therapy.  She was discharged on 10/13/21 with Lantus insulin 30U qhs, Metformin increased to 1000mg po BID, and also started on Ozempic 0.25 mg SQ weekly.\par \par She is checking fingerstick glucose regularly 3x/day and using Livongo device to track sugars to her satisfaction. She denies any recent hypoglycemic episodes or hypoglycemic symptoms. She is due for annual podiatry visit and states she is making appointment and has appt for opthalmology for annual screening in Dec 2021. She denies any polyuria, polydipsia and notes since taking her ozempic, she feels like she lost a "few lbs" and able to fit into her old pants. She notes her appetite also decreased.\par \par Review of Livongo device notes the average daily blood glucose is 123mg/dl, and patient is 82% in target range. \par Am fasting ranges from 105-135, pre lunch ranges from , pre dinner ranges from 154-122, and bedtime ranges from 113-158.\par \par Home Meds: Basaglar 30U SQ qhs, Ozempic 0.5mg SQ weekly, Metformin 1000mg BID, Lipitor 80mg daily, toprol 25mg, asa 81mg daily, brillinta, vitamin D otc

## 2021-11-11 ENCOUNTER — APPOINTMENT (OUTPATIENT)
Dept: CARDIOLOGY | Facility: CLINIC | Age: 51
End: 2021-11-11

## 2021-11-24 ENCOUNTER — EMERGENCY (EMERGENCY)
Facility: HOSPITAL | Age: 51
LOS: 1 days | Discharge: HOME | End: 2021-11-24
Attending: CLINIC/CENTER
Payer: COMMERCIAL

## 2021-11-24 VITALS
WEIGHT: 207.9 LBS | RESPIRATION RATE: 20 BRPM | DIASTOLIC BLOOD PRESSURE: 74 MMHG | HEIGHT: 68 IN | OXYGEN SATURATION: 100 % | TEMPERATURE: 98 F | HEART RATE: 96 BPM | SYSTOLIC BLOOD PRESSURE: 112 MMHG

## 2021-11-24 VITALS
DIASTOLIC BLOOD PRESSURE: 72 MMHG | RESPIRATION RATE: 16 BRPM | HEART RATE: 84 BPM | OXYGEN SATURATION: 100 % | SYSTOLIC BLOOD PRESSURE: 117 MMHG | TEMPERATURE: 98 F

## 2021-11-24 DIAGNOSIS — Z90.49 ACQUIRED ABSENCE OF OTHER SPECIFIED PARTS OF DIGESTIVE TRACT: Chronic | ICD-10-CM

## 2021-11-24 LAB
ALBUMIN SERPL ELPH-MCNC: 4.3 G/DL — SIGNIFICANT CHANGE UP (ref 3.3–5)
ALP SERPL-CCNC: 94 U/L — SIGNIFICANT CHANGE UP (ref 40–120)
ALT FLD-CCNC: 23 U/L — SIGNIFICANT CHANGE UP (ref 10–45)
ANION GAP SERPL CALC-SCNC: 14 MMOL/L — SIGNIFICANT CHANGE UP (ref 5–17)
ANISOCYTOSIS BLD QL: SLIGHT — SIGNIFICANT CHANGE UP
APPEARANCE UR: CLEAR — SIGNIFICANT CHANGE UP
APTT BLD: 19.2 SEC — LOW (ref 27.5–35.5)
AST SERPL-CCNC: 29 U/L — SIGNIFICANT CHANGE UP (ref 10–40)
BACTERIA # UR AUTO: NEGATIVE — SIGNIFICANT CHANGE UP
BASOPHILS # BLD AUTO: 0 K/UL — SIGNIFICANT CHANGE UP (ref 0–0.2)
BASOPHILS NFR BLD AUTO: 0 % — SIGNIFICANT CHANGE UP (ref 0–2)
BILIRUB SERPL-MCNC: 0.2 MG/DL — SIGNIFICANT CHANGE UP (ref 0.2–1.2)
BILIRUB UR-MCNC: NEGATIVE — SIGNIFICANT CHANGE UP
BLD GP AB SCN SERPL QL: NEGATIVE — SIGNIFICANT CHANGE UP
BUN SERPL-MCNC: 8 MG/DL — SIGNIFICANT CHANGE UP (ref 7–23)
CALCIUM SERPL-MCNC: 9.2 MG/DL — SIGNIFICANT CHANGE UP (ref 8.4–10.5)
CHLORIDE SERPL-SCNC: 102 MMOL/L — SIGNIFICANT CHANGE UP (ref 96–108)
CO2 SERPL-SCNC: 21 MMOL/L — LOW (ref 22–31)
COLOR SPEC: SIGNIFICANT CHANGE UP
CREAT SERPL-MCNC: 0.62 MG/DL — SIGNIFICANT CHANGE UP (ref 0.5–1.3)
DACRYOCYTES BLD QL SMEAR: SLIGHT — SIGNIFICANT CHANGE UP
DIFF PNL FLD: ABNORMAL
ELLIPTOCYTES BLD QL SMEAR: SLIGHT — SIGNIFICANT CHANGE UP
EOSINOPHIL # BLD AUTO: 0 K/UL — SIGNIFICANT CHANGE UP (ref 0–0.5)
EOSINOPHIL NFR BLD AUTO: 0 % — SIGNIFICANT CHANGE UP (ref 0–6)
EPI CELLS # UR: 1 /HPF — SIGNIFICANT CHANGE UP
GIANT PLATELETS BLD QL SMEAR: PRESENT — SIGNIFICANT CHANGE UP
GLUCOSE SERPL-MCNC: 146 MG/DL — HIGH (ref 70–99)
GLUCOSE UR QL: ABNORMAL
HCG SERPL-ACNC: <2 MIU/ML — SIGNIFICANT CHANGE UP
HCT VFR BLD CALC: 21.4 % — LOW (ref 34.5–45)
HGB BLD-MCNC: 6.4 G/DL — CRITICAL LOW (ref 11.5–15.5)
HYALINE CASTS # UR AUTO: 0 /LPF — SIGNIFICANT CHANGE UP (ref 0–2)
INR BLD: 1.08 RATIO — SIGNIFICANT CHANGE UP (ref 0.88–1.16)
KETONES UR-MCNC: ABNORMAL
LEUKOCYTE ESTERASE UR-ACNC: NEGATIVE — SIGNIFICANT CHANGE UP
LYMPHOCYTES # BLD AUTO: 1.45 K/UL — SIGNIFICANT CHANGE UP (ref 1–3.3)
LYMPHOCYTES # BLD AUTO: 14.9 % — SIGNIFICANT CHANGE UP (ref 13–44)
MACROCYTES BLD QL: SLIGHT — SIGNIFICANT CHANGE UP
MANUAL SMEAR VERIFICATION: SIGNIFICANT CHANGE UP
MCHC RBC-ENTMCNC: 24.2 PG — LOW (ref 27–34)
MCHC RBC-ENTMCNC: 29.9 GM/DL — LOW (ref 32–36)
MCV RBC AUTO: 81.1 FL — SIGNIFICANT CHANGE UP (ref 80–100)
MICROCYTES BLD QL: SLIGHT — SIGNIFICANT CHANGE UP
MONOCYTES # BLD AUTO: 0.77 K/UL — SIGNIFICANT CHANGE UP (ref 0–0.9)
MONOCYTES NFR BLD AUTO: 7.9 % — SIGNIFICANT CHANGE UP (ref 2–14)
NEUTROPHILS # BLD AUTO: 7.53 K/UL — HIGH (ref 1.8–7.4)
NEUTROPHILS NFR BLD AUTO: 77.2 % — HIGH (ref 43–77)
NITRITE UR-MCNC: NEGATIVE — SIGNIFICANT CHANGE UP
PH UR: 6 — SIGNIFICANT CHANGE UP (ref 5–8)
PLAT MORPH BLD: NORMAL — SIGNIFICANT CHANGE UP
PLATELET # BLD AUTO: 356 K/UL — SIGNIFICANT CHANGE UP (ref 150–400)
POIKILOCYTOSIS BLD QL AUTO: SLIGHT — SIGNIFICANT CHANGE UP
POLYCHROMASIA BLD QL SMEAR: SLIGHT — SIGNIFICANT CHANGE UP
POTASSIUM SERPL-MCNC: 3.8 MMOL/L — SIGNIFICANT CHANGE UP (ref 3.5–5.3)
POTASSIUM SERPL-SCNC: 3.8 MMOL/L — SIGNIFICANT CHANGE UP (ref 3.5–5.3)
PROT SERPL-MCNC: 7.3 G/DL — SIGNIFICANT CHANGE UP (ref 6–8.3)
PROT UR-MCNC: NEGATIVE — SIGNIFICANT CHANGE UP
PROTHROM AB SERPL-ACNC: 12.9 SEC — SIGNIFICANT CHANGE UP (ref 10.6–13.6)
RBC # BLD: 2.64 M/UL — LOW (ref 3.8–5.2)
RBC # FLD: 19.2 % — HIGH (ref 10.3–14.5)
RBC BLD AUTO: ABNORMAL
RBC CASTS # UR COMP ASSIST: 12 /HPF — HIGH (ref 0–4)
RH IG SCN BLD-IMP: NEGATIVE — SIGNIFICANT CHANGE UP
SARS-COV-2 RNA SPEC QL NAA+PROBE: SIGNIFICANT CHANGE UP
SODIUM SERPL-SCNC: 137 MMOL/L — SIGNIFICANT CHANGE UP (ref 135–145)
SP GR SPEC: 1.03 — HIGH (ref 1.01–1.02)
UROBILINOGEN FLD QL: NEGATIVE — SIGNIFICANT CHANGE UP
WBC # BLD: 9.76 K/UL — SIGNIFICANT CHANGE UP (ref 3.8–10.5)
WBC # FLD AUTO: 9.76 K/UL — SIGNIFICANT CHANGE UP (ref 3.8–10.5)
WBC UR QL: 1 /HPF — SIGNIFICANT CHANGE UP (ref 0–5)

## 2021-11-24 PROCEDURE — 99218: CPT

## 2021-11-24 PROCEDURE — 93975 VASCULAR STUDY: CPT | Mod: 26

## 2021-11-24 PROCEDURE — 76830 TRANSVAGINAL US NON-OB: CPT | Mod: 26

## 2021-11-24 PROCEDURE — 93010 ELECTROCARDIOGRAM REPORT: CPT

## 2021-11-24 NOTE — ED CDU PROVIDER INITIAL DAY NOTE - MEDICAL DECISION MAKING DETAILS
Shyam Tran MD, Attending: recent STEMI on blood thinner p.w anemia 2/2 to vaginal bleeding. mild vaginal bleeding on exam, found to have anemia requiring transfusion. will rpt labs afterwards. vaginal bleeding likely from fibroids vs premenapause bleed. no sign of infection or unstable hemorrhage. will observe for transfusion rxn. if worsening bleeding will re-consult gyn.

## 2021-11-24 NOTE — ED PROVIDER NOTE - ATTENDING CONTRIBUTION TO CARE
Agree with above except noted:    asa and Brilinta p.w vaginal bleeding for the last 12 days w. anemia to hemoglobin of 6 at gyn office today. reports lightheadedness. non tachycardiac, non tachypneic or diaphoretic. no abdominal tenderness or guarding. vaginal exam showed gross bleeding but no hemorrhage. concerning for anemia requiring transfusion. will get comprehensive labs to evaluate for hematologic abnormality, renal function, electrolyte derangement for possible symptom etiology. tvus for fibroids. Agree with above except noted:    recent STEMI on (asa and Brilinta) p.w vaginal bleeding for the last 12 days w. anemia to hemoglobin of 6 at gyn office today. reports lightheadedness. non tachycardiac, non tachypneic or diaphoretic. no abdominal tenderness or guarding. vaginal exam showed gross bleeding but no hemorrhage. concerning for anemia requiring transfusion. will get comprehensive labs to evaluate for hematologic abnormality, renal function, electrolyte derangement for possible symptom etiology. tvus for fibroids.

## 2021-11-24 NOTE — ED ADULT NURSE NOTE - NSIMPLEMENTINTERV_GEN_ALL_ED
Implemented All Fall with Harm Risk Interventions:  [place] to call system. Call bell, personal items and telephone within reach. Instruct patient to call for assistance. Room bathroom lighting operational. Non-slip footwear when patient is off stretcher. Physically safe environment: no spills, clutter or unnecessary equipment. Stretcher in lowest position, wheels locked, appropriate side rails in place. Provide visual cue, wrist band, yellow gown, etc. Monitor gait and stability. Monitor for mental status changes and reorient to person, place, and time. Review medications for side effects contributing to fall risk. Reinforce activity limits and safety measures with patient and family. Provide visual clues: red socks.

## 2021-11-24 NOTE — ED ADULT TRIAGE NOTE - CHIEF COMPLAINT QUOTE
vaginal bleeding since saturday, started menstrual cycle on 12/20. usually lasts about 5 days. went to gyn yesterday and was called to come to ER due to low hgb. endorsing dizziness and tired. soaking through 1 pad every 2-3 hours since saturday. on brilinta due to a stent in october.

## 2021-11-24 NOTE — ED CLERICAL - NS ED CLERK NOTE PRE-ARRIVAL INFORMATION; ADDITIONAL PRE-ARRIVAL INFORMATION
CC/Reason For referral: Hx: of vaginal bleeding/blood transfusion needed   Preferred Consultant(if applicable):  Who admits for you (if needed):  Do you have documents you would like to fax over?  Would you still like to speak to an ED attending? yes

## 2021-11-24 NOTE — ED PROVIDER NOTE - OBJECTIVE STATEMENT
51y female PMHx DMII, STEMI w/recent admission sent in from outpt OBGYN office for low h/h. Pt reports heavy vaginal bleeding for approx 12 days, currently soaking through 4pads an hour, has changed once since arriving in the ED. reports bleeding has improved over the last week, clots have decreased in size. pt also reports generalized fatigue and dizziness with exertion. Denies abdominal pain, urinary symptoms, fever, chills, CP, SOB, or LOC  OBGYN Dr. Delarosa

## 2021-11-24 NOTE — ED CDU PROVIDER INITIAL DAY NOTE - PROGRESS NOTE DETAILS
Pt receiving 2nd unit PRBC. States that she is still bleeding although has not worsened. I spoke with GYN who will see patient for consult. - Sandee Jaffe PA-C Spoke with GYN, states that she spoke with attending who saw pt in office today. States that plan is for blood-no other GYN recommendations at this time. - Sandee Jaffe PA-C

## 2021-11-24 NOTE — ED PROVIDER NOTE - PROGRESS NOTE DETAILS
case d/w OBGYN who were notified of the case prior to arrival. pt had outpt HGB of 6.0 this am. recommending TVUS, transfuse as needed. case endorsed to CDU PA - aware of pt, will see in ED - Jese Arthur PA-C

## 2021-11-24 NOTE — ED ADULT NURSE NOTE - OBJECTIVE STATEMENT
51 y.o female, A&Ox3, PMH DM, heart attack last month, stent placed, pt presents to ED for vaginal bleeding. pt states she went to OBGYN appointment this morning and they called her suggesting she come to ED due to low h/h levels. pt states she started her menstrual cycle on 11/13, pt states her menstrual cycle usually last 5 days, pt states this menstrual cycle she noted heavier bleeding and more clots. pt states she goes through 3-4 pads an hour. pt endorses increased weakness and dizziness this week. no dizziness at this time. pt denies headache, fever, chills, N/V/D at this time. IV access established, safety and comfort provided. 51 y.o female, A&Ox3, PMH DM, heart attack last month, stent placed (takes Brilinta), pt presents to ED for vaginal bleeding. pt states she went to OBGYN appointment this morning and they called her suggesting she come to ED due to low h/h levels. pt states she started her menstrual cycle on 11/13, pt states her menstrual cycle usually last 5 days, pt states this menstrual cycle she noted heavier bleeding and more clots. pt states she goes through 3-4 pads an hour. pt endorses increased weakness and dizziness this week. no dizziness at this time. pt denies headache, fever, chills, N/V/D at this time. IV access established, safety and comfort provided.

## 2021-11-25 LAB
HCT VFR BLD CALC: 29.3 % — LOW (ref 34.5–45)
HGB BLD-MCNC: 9.1 G/DL — LOW (ref 11.5–15.5)
MCHC RBC-ENTMCNC: 26 PG — LOW (ref 27–34)
MCHC RBC-ENTMCNC: 31.1 GM/DL — LOW (ref 32–36)
MCV RBC AUTO: 83.7 FL — SIGNIFICANT CHANGE UP (ref 80–100)
NRBC # BLD: 0 /100 WBCS — SIGNIFICANT CHANGE UP (ref 0–0)
PLATELET # BLD AUTO: 303 K/UL — SIGNIFICANT CHANGE UP (ref 150–400)
RBC # BLD: 3.5 M/UL — LOW (ref 3.8–5.2)
RBC # FLD: 17.2 % — HIGH (ref 10.3–14.5)
WBC # BLD: 9.57 K/UL — SIGNIFICANT CHANGE UP (ref 3.8–10.5)
WBC # FLD AUTO: 9.57 K/UL — SIGNIFICANT CHANGE UP (ref 3.8–10.5)

## 2021-11-25 PROCEDURE — 93975 VASCULAR STUDY: CPT

## 2021-11-25 PROCEDURE — 85730 THROMBOPLASTIN TIME PARTIAL: CPT

## 2021-11-25 PROCEDURE — 80053 COMPREHEN METABOLIC PANEL: CPT

## 2021-11-25 PROCEDURE — U0003: CPT

## 2021-11-25 PROCEDURE — G0378: CPT

## 2021-11-25 PROCEDURE — 85025 COMPLETE CBC W/AUTO DIFF WBC: CPT

## 2021-11-25 PROCEDURE — U0005: CPT

## 2021-11-25 PROCEDURE — 86923 COMPATIBILITY TEST ELECTRIC: CPT

## 2021-11-25 PROCEDURE — 85610 PROTHROMBIN TIME: CPT

## 2021-11-25 PROCEDURE — 93005 ELECTROCARDIOGRAM TRACING: CPT

## 2021-11-25 PROCEDURE — 86850 RBC ANTIBODY SCREEN: CPT

## 2021-11-25 PROCEDURE — 84702 CHORIONIC GONADOTROPIN TEST: CPT

## 2021-11-25 PROCEDURE — 36430 TRANSFUSION BLD/BLD COMPNT: CPT

## 2021-11-25 PROCEDURE — 86900 BLOOD TYPING SEROLOGIC ABO: CPT

## 2021-11-25 PROCEDURE — 85027 COMPLETE CBC AUTOMATED: CPT

## 2021-11-25 PROCEDURE — 99284 EMERGENCY DEPT VISIT MOD MDM: CPT

## 2021-11-25 PROCEDURE — 81001 URINALYSIS AUTO W/SCOPE: CPT

## 2021-11-25 PROCEDURE — 76830 TRANSVAGINAL US NON-OB: CPT

## 2021-11-25 PROCEDURE — P9016: CPT

## 2021-11-25 PROCEDURE — 99217: CPT

## 2021-11-25 PROCEDURE — 86901 BLOOD TYPING SEROLOGIC RH(D): CPT

## 2021-11-25 PROCEDURE — 36415 COLL VENOUS BLD VENIPUNCTURE: CPT

## 2021-11-25 NOTE — ED CDU PROVIDER SUBSEQUENT DAY NOTE - MEDICAL DECISION MAKING DETAILS
Attending MD Griffin : Patient assessed at bedside. Vitals normal and Hgb improved. Patient is still bleeding but much less compared to prior that she has not soaked through a pad over 4 hours. Patient comfortable going home. Given strict return precautions and will closely follow-up in 2 days either with PMD or UC to obtain repeat Hgb and will return if downtrending.

## 2021-11-25 NOTE — ED CDU PROVIDER SUBSEQUENT DAY NOTE - PHYSICAL EXAMINATION
GEN: Well Appearing, Nontoxic, NAD  HEENT: NC/AT, Symm Facies. Eyes clear.   CV: No JVD/Bruits or stridor;  +S1S2, RRR w/o m/g/r  RESP: CTAB w/o w/r/r  ABD: Soft, nt/nd  EXT/MSK: No lower extremity edema or calf tenderness. FROMx4  PSYCH: Appropriate mood and affect   Neuro: Grossly intact, AOX3 with normal speech, C Gait normal

## 2021-11-25 NOTE — ED CDU PROVIDER SUBSEQUENT DAY NOTE - NS ED ROS FT
Constitutional: No fever or chills  Eyes: No visual changes, eye pain  CV: No chest pain or lower extremity edema  Resp: No SOB no cough  GI: No abd pain. No nausea or vomiting. No diarrhea. No constipation.   : No dysuria, hematuria. +vaginal bleeding   MSK: No musculoskeletal pain  Skin: No rash  Psych: No complaints   Neuro: No headache. No numbness or tingling. +weakness +dizziness

## 2021-11-25 NOTE — ED CDU PROVIDER DISPOSITION NOTE - PATIENT PORTAL LINK FT
You can access the FollowMyHealth Patient Portal offered by [facility] by registering at the following website: [email or web address]. By joining [facility]’s FollowMyHealth portal, you will also be able to view your health information using other applications (apps) compatible with our system.

## 2021-11-25 NOTE — ED CDU PROVIDER SUBSEQUENT DAY NOTE - HISTORY
Patient's H/H increased from 6.4/21.4 to 9.1/29.3. Patient denies worsening vaginal bleeding. Denies SOB, denies dizziness. Has been ambulating steadily. Discussed with Dr. Griffin potential discharge. Will see patient in CDU. I discussed ultrasound read "Enlarged/bulky and heterogeneous uterus with asymmetric thickening of the anterior endometrium seen infiltrating the anterior myometrium which may represent changes related to adenomyosis/adenomyoma versus endometrial neoplasm. Further evaluation with pelvic MRI and tissue sampling is suggested."   - Sandee Jaffe PA-C

## 2021-11-25 NOTE — ED CDU PROVIDER DISPOSITION NOTE - NSFOLLOWUPINSTRUCTIONS_ED_ALL_ED_FT
Hydrate.     You may be contacted by our Emergency Department Referrals Coordinator to set up your follow up appointment within 24-48 hours of your discharge Monday- Friday. We recommend you follow up with your primary care provider within the next 2-3 days, please bring all of your results with you. You can also follow up with your OBGYN in 1-2 days.     We would recommend further evaluation with pelvic MRI and tissue sampling. Please speak with your OBGYN regarding the ultrasound findings.    Please return to the Emergency Department with new, worsening, or concerning symptoms, such as:  -Shortness of breath or trouble breathing  -Dizziness, syncope, worsening vaginal bleeding  -Pressure, pain, tightness in chest  -Facial drooping, arm weakness, or speech difficulty   -Head injury or loss of consciousness   -Nonstop bleeding or an open wound     *More detailed information regarding your visit and discharge can be found by reviewing this packet

## 2021-11-25 NOTE — ED ADULT NURSE REASSESSMENT NOTE - NS ED NURSE REASSESS COMMENT FT1
As per MD Tran report to be called to CDU RN once cbc resulted and for blood to be administered in CDU, consent in paper chart .
Patient received from [facility] in [facility]. Patient denies any pain or cramping at this time. Patient stated vaginal bleeding is still about the same as when she first came in. Patient oriented to call bell. Safety maintained.
Patient with order for 2 UPRBC. Transfusion consent signed and in chart. 1st unit of blood verified with VALORIE Corrigan. Patient denies any signs or symptoms of transfusion reaction. Vital signs remain stable. Will continue to monitor. Safety maintained.
Pt. D/C by [facility] and ALAN Jaffe. IV removed. Will follow up with OBGYN per MD order. All questions answered. Steady gait observed.
pt at ultrasound at this time
pt returned from ultrasound
Received pt. from day shift [facility]. Pt. A&Ox4 resting in bed comfortably with spouse at bedside. Pt. states vaginal bleeding is still present. Pt. describes vaginal bleeding as "normal period flow" and states blood is dark with some clots. Pt. not complaining of any pain ir discomfort. Steady gait observed while ambulating. Pt. tolerated first unit RBC's well, pending second unit. Pt. vitals remain stable - view flow-sheet. Second unit to be started at 20:30 and labs will be drawn to recheck H&H when the second unit is completed. Will continue to monitor and assess.

## 2021-11-28 ENCOUNTER — RESULT REVIEW (OUTPATIENT)
Age: 51
End: 2021-11-28

## 2021-11-29 ENCOUNTER — RESULT REVIEW (OUTPATIENT)
Age: 51
End: 2021-11-29

## 2021-12-04 ENCOUNTER — APPOINTMENT (OUTPATIENT)
Dept: ULTRASOUND IMAGING | Facility: CLINIC | Age: 51
End: 2021-12-04

## 2021-12-07 ENCOUNTER — APPOINTMENT (OUTPATIENT)
Dept: ENDOCRINOLOGY | Facility: CLINIC | Age: 51
End: 2021-12-07
Payer: COMMERCIAL

## 2021-12-07 VITALS
TEMPERATURE: 97.6 F | WEIGHT: 201 LBS | OXYGEN SATURATION: 100 % | SYSTOLIC BLOOD PRESSURE: 103 MMHG | BODY MASS INDEX: 32.3 KG/M2 | HEART RATE: 83 BPM | DIASTOLIC BLOOD PRESSURE: 69 MMHG | HEIGHT: 66 IN

## 2021-12-07 PROCEDURE — 99213 OFFICE O/P EST LOW 20 MIN: CPT

## 2021-12-08 NOTE — ASSESSMENT
[Diabetes Foot Care] : diabetes foot care [FreeTextEntry1] : 1. Type 2 DM \par Noted HgbA1c from November 2021 is 8.3%, improved since previous. \par Using Liv[employer] meter, however patient notified it is no longer covered. She does have one touch glucometer available as backup option at home.  \par Spoke to [facility] pharmacy (tele:[phone]), Livonogo is no longer covered, as part of [facility] transitioning patients to use Patch which is provided to patients for free (tele [phone])\par Patient did nto take Ozempic for one month due to running out of refills.\par Review of glucose monitoring at home show acceptable glucose levels thus far with no hypoglycemia.\par Continue on Lantus 30U qhs, resume Ozempic 0.25mg weekly (refills sent today), Jardiance 10mg daily and Metformin 1000mg BID.\par \par Answered all questions; patient verbalized understanding of the above.\par RTC in 3 months.

## 2021-12-08 NOTE — HISTORY OF PRESENT ILLNESS
[FreeTextEntry1] : This is a 50 yo female with past medical history of type 2 DM, h/o NSTEMI s/p PCI in Oct 2021, who presents for follow up of diabetes management. \par \par Patient was last seen in endocrine clinic in November 2020 for follow up at which time noted improvement in A1c from 11.4 to 8.3%. She was continued on Lantus 30U at bedtime, Ozempic 0.5mg weekly and metformin 1000mg po BID. Jardiance 10mg daily was also started at the time. Since then, patient notes she was admitted to hospital for heavy vaginal bleeding, found to have uterine fibroids, and being followed up by GYN for further workup. She also notes she ran out of Ozempic but forgot to let the clinic know to send refill at that time but tried to leave message with . She says she is tolerating Jardiance well without any reported side effects. \par She uses ChirpVision reader but also has fingerstick glucometer at home. She recalls her AM fasting glucose ranges from , pre lunch and pre dinner ranges from 90-130s. She denies any no hypoglycemic episodes or hypoglycemic symptoms. She denies n/v/d or abdominal pain.

## 2021-12-22 ENCOUNTER — RX RENEWAL (OUTPATIENT)
Age: 51
End: 2021-12-22

## 2021-12-27 ENCOUNTER — INPATIENT (INPATIENT)
Facility: HOSPITAL | Age: 51
LOS: 0 days | Discharge: HOME | DRG: 760 | End: 2021-12-28
Attending: OBSTETRICS & GYNECOLOGY | Admitting: OBSTETRICS & GYNECOLOGY
Payer: COMMERCIAL

## 2021-12-27 VITALS
DIASTOLIC BLOOD PRESSURE: 59 MMHG | OXYGEN SATURATION: 100 % | SYSTOLIC BLOOD PRESSURE: 100 MMHG | HEIGHT: 68 IN | RESPIRATION RATE: 20 BRPM | TEMPERATURE: 98 F | HEART RATE: 92 BPM

## 2021-12-27 DIAGNOSIS — Z90.49 ACQUIRED ABSENCE OF OTHER SPECIFIED PARTS OF DIGESTIVE TRACT: Chronic | ICD-10-CM

## 2021-12-27 DIAGNOSIS — D62 ACUTE POSTHEMORRHAGIC ANEMIA: ICD-10-CM

## 2021-12-27 LAB
ALBUMIN SERPL ELPH-MCNC: 4.3 G/DL — SIGNIFICANT CHANGE UP (ref 3.3–5)
ALP SERPL-CCNC: 94 U/L — SIGNIFICANT CHANGE UP (ref 40–120)
ALT FLD-CCNC: 16 U/L — SIGNIFICANT CHANGE UP (ref 10–45)
ANION GAP SERPL CALC-SCNC: 12 MMOL/L — SIGNIFICANT CHANGE UP (ref 5–17)
ANISOCYTOSIS BLD QL: SLIGHT — SIGNIFICANT CHANGE UP
APTT BLD: 27.3 SEC — LOW (ref 27.5–35.5)
AST SERPL-CCNC: 18 U/L — SIGNIFICANT CHANGE UP (ref 10–40)
BASOPHILS # BLD AUTO: 0.32 K/UL — HIGH (ref 0–0.2)
BASOPHILS NFR BLD AUTO: 2.7 % — HIGH (ref 0–2)
BILIRUB SERPL-MCNC: 0.2 MG/DL — SIGNIFICANT CHANGE UP (ref 0.2–1.2)
BLD GP AB SCN SERPL QL: NEGATIVE — SIGNIFICANT CHANGE UP
BUN SERPL-MCNC: 7 MG/DL — SIGNIFICANT CHANGE UP (ref 7–23)
CALCIUM SERPL-MCNC: 9.5 MG/DL — SIGNIFICANT CHANGE UP (ref 8.4–10.5)
CHLORIDE SERPL-SCNC: 105 MMOL/L — SIGNIFICANT CHANGE UP (ref 96–108)
CO2 SERPL-SCNC: 21 MMOL/L — LOW (ref 22–31)
CREAT SERPL-MCNC: 0.61 MG/DL — SIGNIFICANT CHANGE UP (ref 0.5–1.3)
DACRYOCYTES BLD QL SMEAR: SLIGHT — SIGNIFICANT CHANGE UP
ELLIPTOCYTES BLD QL SMEAR: SLIGHT — SIGNIFICANT CHANGE UP
EOSINOPHIL # BLD AUTO: 0 K/UL — SIGNIFICANT CHANGE UP (ref 0–0.5)
EOSINOPHIL NFR BLD AUTO: 0 % — SIGNIFICANT CHANGE UP (ref 0–6)
GIANT PLATELETS BLD QL SMEAR: PRESENT — SIGNIFICANT CHANGE UP
GLUCOSE SERPL-MCNC: 172 MG/DL — HIGH (ref 70–99)
HCT VFR BLD CALC: 24.3 % — LOW (ref 34.5–45)
HGB BLD-MCNC: 6.6 G/DL — CRITICAL LOW (ref 11.5–15.5)
HYPOCHROMIA BLD QL: SLIGHT — SIGNIFICANT CHANGE UP
INR BLD: 1.07 RATIO — SIGNIFICANT CHANGE UP (ref 0.88–1.16)
LYMPHOCYTES # BLD AUTO: 1.48 K/UL — SIGNIFICANT CHANGE UP (ref 1–3.3)
LYMPHOCYTES # BLD AUTO: 12.6 % — LOW (ref 13–44)
MACROCYTES BLD QL: SLIGHT — SIGNIFICANT CHANGE UP
MANUAL SMEAR VERIFICATION: SIGNIFICANT CHANGE UP
MCHC RBC-ENTMCNC: 22.1 PG — LOW (ref 27–34)
MCHC RBC-ENTMCNC: 27.2 GM/DL — LOW (ref 32–36)
MCV RBC AUTO: 81.5 FL — SIGNIFICANT CHANGE UP (ref 80–100)
MONOCYTES # BLD AUTO: 0.42 K/UL — SIGNIFICANT CHANGE UP (ref 0–0.9)
MONOCYTES NFR BLD AUTO: 3.6 % — SIGNIFICANT CHANGE UP (ref 2–14)
NEUTROPHILS # BLD AUTO: 9.55 K/UL — HIGH (ref 1.8–7.4)
NEUTROPHILS NFR BLD AUTO: 81.1 % — HIGH (ref 43–77)
NRBC # BLD: 1 /100 — HIGH (ref 0–0)
PLAT MORPH BLD: NORMAL — SIGNIFICANT CHANGE UP
PLATELET # BLD AUTO: 481 K/UL — HIGH (ref 150–400)
POIKILOCYTOSIS BLD QL AUTO: SLIGHT — SIGNIFICANT CHANGE UP
POLYCHROMASIA BLD QL SMEAR: SIGNIFICANT CHANGE UP
POTASSIUM SERPL-MCNC: 4.3 MMOL/L — SIGNIFICANT CHANGE UP (ref 3.5–5.3)
POTASSIUM SERPL-SCNC: 4.3 MMOL/L — SIGNIFICANT CHANGE UP (ref 3.5–5.3)
PROT SERPL-MCNC: 7.4 G/DL — SIGNIFICANT CHANGE UP (ref 6–8.3)
PROTHROM AB SERPL-ACNC: 12.8 SEC — SIGNIFICANT CHANGE UP (ref 10.6–13.6)
RBC # BLD: 2.98 M/UL — LOW (ref 3.8–5.2)
RBC # FLD: 16.4 % — HIGH (ref 10.3–14.5)
RBC BLD AUTO: ABNORMAL
RH IG SCN BLD-IMP: NEGATIVE — SIGNIFICANT CHANGE UP
SARS-COV-2 RNA SPEC QL NAA+PROBE: SIGNIFICANT CHANGE UP
SCHISTOCYTES BLD QL AUTO: SLIGHT — SIGNIFICANT CHANGE UP
SODIUM SERPL-SCNC: 138 MMOL/L — SIGNIFICANT CHANGE UP (ref 135–145)
WBC # BLD: 11.78 K/UL — HIGH (ref 3.8–10.5)
WBC # FLD AUTO: 11.78 K/UL — HIGH (ref 3.8–10.5)

## 2021-12-27 PROCEDURE — 93010 ELECTROCARDIOGRAM REPORT: CPT

## 2021-12-27 PROCEDURE — 99291 CRITICAL CARE FIRST HOUR: CPT

## 2021-12-27 PROCEDURE — 99223 1ST HOSP IP/OBS HIGH 75: CPT

## 2021-12-27 PROCEDURE — 71045 X-RAY EXAM CHEST 1 VIEW: CPT | Mod: 26

## 2021-12-27 PROCEDURE — 99292 CRITICAL CARE ADDL 30 MIN: CPT

## 2021-12-27 RX ORDER — TICAGRELOR 90 MG/1
60 TABLET ORAL EVERY 12 HOURS
Refills: 0 | Status: DISCONTINUED | OUTPATIENT
Start: 2021-12-27 | End: 2021-12-28

## 2021-12-27 RX ORDER — ATORVASTATIN CALCIUM 80 MG/1
80 TABLET, FILM COATED ORAL AT BEDTIME
Refills: 0 | Status: DISCONTINUED | OUTPATIENT
Start: 2021-12-27 | End: 2021-12-28

## 2021-12-27 RX ORDER — NORETHINDRONE 0.35 MG/1
1 TABLET ORAL
Qty: 10 | Refills: 0
Start: 2021-12-27 | End: 2022-01-05

## 2021-12-27 RX ORDER — DEXTROSE 50 % IN WATER 50 %
25 SYRINGE (ML) INTRAVENOUS ONCE
Refills: 0 | Status: DISCONTINUED | OUTPATIENT
Start: 2021-12-27 | End: 2021-12-28

## 2021-12-27 RX ORDER — DEXTROSE 50 % IN WATER 50 %
15 SYRINGE (ML) INTRAVENOUS ONCE
Refills: 0 | Status: DISCONTINUED | OUTPATIENT
Start: 2021-12-27 | End: 2021-12-28

## 2021-12-27 RX ORDER — GLUCAGON INJECTION, SOLUTION 0.5 MG/.1ML
1 INJECTION, SOLUTION SUBCUTANEOUS ONCE
Refills: 0 | Status: DISCONTINUED | OUTPATIENT
Start: 2021-12-27 | End: 2021-12-28

## 2021-12-27 RX ORDER — SODIUM CHLORIDE 9 MG/ML
1000 INJECTION, SOLUTION INTRAVENOUS
Refills: 0 | Status: DISCONTINUED | OUTPATIENT
Start: 2021-12-27 | End: 2021-12-28

## 2021-12-27 RX ORDER — NORETHINDRONE 0.35 MG/1
5 TABLET ORAL DAILY
Refills: 0 | Status: DISCONTINUED | OUTPATIENT
Start: 2021-12-27 | End: 2021-12-28

## 2021-12-27 RX ORDER — INSULIN GLARGINE 100 [IU]/ML
30 INJECTION, SOLUTION SUBCUTANEOUS AT BEDTIME
Refills: 0 | Status: DISCONTINUED | OUTPATIENT
Start: 2021-12-27 | End: 2021-12-28

## 2021-12-27 RX ORDER — METFORMIN HYDROCHLORIDE 850 MG/1
1000 TABLET ORAL
Refills: 0 | Status: DISCONTINUED | OUTPATIENT
Start: 2021-12-27 | End: 2021-12-28

## 2021-12-27 RX ORDER — DEXTROSE 50 % IN WATER 50 %
12.5 SYRINGE (ML) INTRAVENOUS ONCE
Refills: 0 | Status: DISCONTINUED | OUTPATIENT
Start: 2021-12-27 | End: 2021-12-28

## 2021-12-27 RX ORDER — ASPIRIN/CALCIUM CARB/MAGNESIUM 324 MG
81 TABLET ORAL DAILY
Refills: 0 | Status: DISCONTINUED | OUTPATIENT
Start: 2021-12-27 | End: 2021-12-28

## 2021-12-27 RX ADMIN — NORETHINDRONE 5 MILLIGRAM(S): 0.35 TABLET ORAL at 20:00

## 2021-12-27 NOTE — H&P ADULT - ASSESSMENT
52 yo F with hx of fibroids and AUB x2 months since starting anticoagulation s/p LHC with mLAD MONI for NSTEMI. Pt reports cyclic heavy bleeding with periods for the past 2 months, requiring transfusion also with her last period in Nov. Patient currently hemodynamically stable with persistent moderate bleeding on speculum exam and vitals wnl. H/H 6.6/24.3 like from heavy menses.   - s/p 2u pRBC transfusion   - Aygestin 5mg x10 days   - Pt scheduled for MRI appointment in Jan for better evaluation of endometrium per last US read showing bulky uterus with asymmetric thickening of endometrium infiltrating into anterior myometrium, r/o neoplasm   - will admit for observation of vaginal bleeding  - strict pad counts  - AM CBC  -cardiology seen and evaluated patient, recommend continuing anticoagulation in the setting of recent MONI  -will continue home meds    Patient seen and evaluated by Dr. Washington Walker, PGY-2     50 yo F with hx of fibroids and AUB x2 months since starting anticoagulation s/p LHC with mLAD MONI for NSTEMI. Pt reports cyclic heavy bleeding with periods for the past 2 months, requiring transfusion also with her last period in Nov. Patient currently hemodynamically stable with persistent moderate bleeding on speculum exam and vitals wnl. H/H 6.6/24.3 like from heavy menses.   - s/p 2u pRBC transfusion   - Aygestin 5mg x10 days   - Pt scheduled for MRI appointment in Jan for better evaluation of endometrium per last US read showing bulky uterus with asymmetric thickening of endometrium infiltrating into anterior myometrium, r/o neoplasm   - will admit for observation of vaginal bleeding  - strict pad counts  - AM CBC  -cardiology seen and evaluated patient, recommend continuing anticoagulation in the setting of recent MONI  -will continue home meds    Patient seen and evaluated by Dr. Washington Walker, PGY-2

## 2021-12-27 NOTE — ED PROVIDER NOTE - CLINICAL SUMMARY MEDICAL DECISION MAKING FREE TEXT BOX
see MD Note see MD Note    pt recently transfused for excessive uterine bleeding presents with persistent menstruation and PRESTON. concern for repeat anemia OB cs and transfuse as needed.

## 2021-12-27 NOTE — H&P ADULT - HISTORY OF PRESENT ILLNESS
52 yo F with h/o fibroids and AUB x 2 months since being started on Brilinta s/p LHC with mLAD MONI (10/2021) for NSTEMI. Pt required blood transfusion in Nov with her last period and now reports similar heavy bleeding x 8 days, changing pads q1 hr. She reports feeling dizzy and SOB which prompted her to come into the ED. She reports regular periods prior to starting anticoagulation. Patient currently on no hormonal treatments and desires hysterectomy for definitive treatment. Per patient, she was recommended MRI for preop planning and has her MRI appt next month.      Name of GYN Physician: Dr. Washington Baugh    PMhx: Diabetes, HTN, NSTEMI s/p LHC with mLAD MONI (10/2021)   PSHx: lap cholecystectomy  Social: denies tobacco, alcohol, recreational drugs   Home meds: Brilinta, metoprolol, ASA, Atorvastatin, Basaglar, Metformin, Ozempic  Allergies: NKDA

## 2021-12-27 NOTE — ED PROVIDER NOTE - OBJECTIVE STATEMENT
pt recently seen at this hospital for anemia secondary to persistent menstrual bleeding pt recently seen at this hospital for anemia secondary to persistent menstrual bleeding presents with ongoing menstruation x4d using 1 pad per hour with large clots. and dyspnea on exertion. no cp abd pain n/v. on dual antiplatelet therapy with brilenta and ASA for MI s/p stent placement in october.

## 2021-12-27 NOTE — ED PROVIDER NOTE - NS ED ROS FT
Constitutional: no fevers, chills  HEENT: no HA, vision changes, rhinorrhea, sore throat  Cardiac: no chest pain, palpitations  Respiratory: no SOB, cough or hemoptysis  GI: no n/v/d/c, abd pain, bloody or dark stools  : no dysuria, frequency, or hematuria  MSK: no joint pain, neck pain or back pain  Skin: no rashes, jaundice, pruritis  Neuro: no numbness/tingling, weakness, unsteady gait  Psych: depression or suicidal thoughts Constitutional: no fevers, chills  HEENT: no HA, vision changes, rhinorrhea, sore throat  Cardiac: no chest pain, palpitations  Respiratory: +PRESTON no cough or hemoptysis  GI: no n/v/d/c, abd pain, bloody or dark stools  : no dysuria, frequency, or hematuria  MSK: no joint pain, neck pain or back pain  Skin: no rashes, jaundice, pruritis  Neuro: no numbness/tingling, weakness, unsteady gait  ros otherwise neg except per hpi

## 2021-12-27 NOTE — CONSULT NOTE ADULT - SUBJECTIVE AND OBJECTIVE BOX
52 yo F with h/o fibroids and AUB x 2 months since being started on Brilinta s/p LHC with mLAD MONI (10/2021) for NSTEMI. Pt required blood transfusion in Nov with her last period and now reports similar heavy bleeding x 8 days, changing pads q1 hr. She reports feeling dizzy and SOB which prompted her to come into the ED. She reports regular periods prior to starting anticoagulation. Patient currently on no hormonal treatments and desires hysterectomy for definitive treatment. Per patient, she was recommended MRI for preop planning and has her MRI appt next month.      Name of GYN Physician: Dr. Delarosa     PMhx: Diabetes, HTN, NSTEMI s/p LHC with mLAD MONI (10/2021)   PSHx: lap cholecystectomy  Social: denies tobacco, alcohol, recreational drugs   Home meds: Brilinta, metoprolol, ASA, Atorvastatin, Basaglar, Metformin, Ozempic  Allergies: No Known Allergies    Vital Signs Last 24 Hrs  T(C): 36.8 (27 Dec 2021 15:23), Max: 36.8 (27 Dec 2021 15:23)  T(F): 98.3 (27 Dec 2021 15:23), Max: 98.3 (27 Dec 2021 15:23)  HR: 89 (27 Dec 2021 16:42) (89 - 92)  BP: 145/84 (27 Dec 2021 16:42) (100/59 - 145/84)  BP(mean): --  RR: 15 (27 Dec 2021 16:42) (15 - 20)  SpO2: 96% (27 Dec 2021 16:42) (96% - 100%)    Physical Exam:   General: sitting comfortably in bed, NAD, AOx3   CV: RRR S1S2   Abd: Soft, non-tender, non-distended. No rebound or guarding     Pelvic: scant blood in vaginal vault, no active heavy bleeding at os   Ext: non-tender b/l, no edema     LABS:                          6.6    11.78 )-----------( 481      ( 27 Dec 2021 15:56 )             24.3     12-27    138  |  105  |  7   ----------------------------<  172<H>  4.3   |  21<L>  |  0.61    Ca    9.5      27 Dec 2021 15:56    TPro  7.4  /  Alb  4.3  /  TBili  0.2  /  DBili  x   /  AST  18  /  ALT  16  /  AlkPhos  94  12-27  PT/INR - ( 27 Dec 2021 15:56 )   PT: 12.8 sec;   INR: 1.07 ratio    PTT - ( 27 Dec 2021 15:56 )  PTT:27.3 sec      RADIOLOGY & ADDITIONAL STUDIES:    < from: US Transvaginal (11.24.21 @ 14:43) >    EXAM:  US DPLX PELVIC                        EXAM:  US TRANSVAGINAL                        PROCEDURE DATE:  11/24/2021    INTERPRETATION:  CLINICAL INFORMATION: Heavy vaginal bleeding x1 week. No pain. Rule out acute pathology.    LMP: 11/13/2021    COMPARISON: None available.    TECHNIQUE:  Endovaginal pelvic sonogram as per order. Transabdominal pelvic sonogram was also performed as part of our standard protocol. Color and spectral Doppler was performed to assess the endometrium/adjacent myometrium.    FINDINGS:    Uterus: 9.2 cm x 6.5 cm x 12.0 cm. . Enlarged and heterogeneous in appearance with linear shadowing. A subserosal fundal fibroid on the left measuring 4.2 x 4.1 x 3.8 cm. Large calcification measuring up to 1.4 cmwithin the posterior uterine body. There is a 2.4 cm x 2.1 cm x 2.1 cm fundal intramural leiomyoma. 3 cm x 2.2 cm x 2.8 cm cervical nabothian cyst containing fine echoes.    Endometrium: 2.3 cm. Heterogeneous. There is irregular thickening of the anterior endometrial lining with infiltration into the anterior myometrium with associated cystic changes measuring up to 1.9 cm in thickness. Arterial vascularity is seen within this region.    A prominent 2.0 cm cervical nabothian cyst with internal debris is seen.    Right ovary: 4.0 x 2.0 x 3.1 cm. A dominant 2.2 cm follicle/cyst is seen within the right ovary. Adjacent smaller septated cyst measuring 1.7 cm x 1.5 cm x 2 cm. Normal arterial and venous waveforms.    Left ovary: 2.8 x 1.6 x 1.5 cm . Within normal limits.    Fluid: Trace simple fluid.    IMPRESSION:    Enlarged/bulky and heterogeneous uterus with asymmetric thickening of the anterior endometrium seen infiltrating the anterior myometrium which may represent changes related to adenomyosis/adenomyoma versus endometrial neoplasm. Further evaluation with pelvic MRI and tissue sampling is suggested.  Heterogeneity within the endometrial cavity likely related to blood products.    Fibroids as above.     50 yo F with h/o fibroids and AUB x 2 months since being started on Brilinta s/p LHC with mLAD MONI (10/2021) for NSTEMI. Pt required blood transfusion in Nov with her last period and now reports similar heavy bleeding x 8 days, changing pads q1 hr. She reports feeling dizzy and SOB which prompted her to come into the ED. She reports regular periods prior to starting anticoagulation. Patient currently on no hormonal treatments and desires hysterectomy for definitive treatment. Per patient, she was recommended MRI for preop planning and has her MRI appt next month.      Name of GYN Physician: Dr. Delarosa     PMhx: Diabetes, HTN, NSTEMI s/p LHC with mLAD MONI (10/2021)   PSHx: lap cholecystectomy  Social: denies tobacco, alcohol, recreational drugs   Home meds: Brilinta, metoprolol, ASA, Atorvastatin, Basaglar, Metformin, Ozempic  Allergies: NKDA    Vital Signs Last 24 Hrs  T(C): 36.8 (27 Dec 2021 15:23), Max: 36.8 (27 Dec 2021 15:23)  T(F): 98.3 (27 Dec 2021 15:23), Max: 98.3 (27 Dec 2021 15:23)  HR: 89 (27 Dec 2021 16:42) (89 - 92)  BP: 145/84 (27 Dec 2021 16:42) (100/59 - 145/84)  BP(mean): --  RR: 15 (27 Dec 2021 16:42) (15 - 20)  SpO2: 96% (27 Dec 2021 16:42) (96% - 100%)    Physical Exam:   General: sitting comfortably in bed, NAD, AOx3   CV: RRR S1S2   Abd: Soft, non-tender, non-distended. No rebound or guarding     Pelvic: scant blood in vaginal vault, no active heavy bleeding at os   Ext: non-tender b/l, no edema       LABS:                          6.6    11.78 )-----------( 481      ( 27 Dec 2021 15:56 )             24.3     12-27    138  |  105  |  7   ----------------------------<  172<H>  4.3   |  21<L>  |  0.61    Ca    9.5      27 Dec 2021 15:56    TPro  7.4  /  Alb  4.3  /  TBili  0.2  /  DBili  x   /  AST  18  /  ALT  16  /  AlkPhos  94  12-27  PT/INR - ( 27 Dec 2021 15:56 )   PT: 12.8 sec;   INR: 1.07 ratio    PTT - ( 27 Dec 2021 15:56 )  PTT:27.3 sec      RADIOLOGY & ADDITIONAL STUDIES:    < from: US Transvaginal (11.24.21 @ 14:43) >    EXAM:  US DPLX PELVIC                        EXAM:  US TRANSVAGINAL                        PROCEDURE DATE:  11/24/2021    INTERPRETATION:  CLINICAL INFORMATION: Heavy vaginal bleeding x1 week. No pain. Rule out acute pathology.    LMP: 11/13/2021    COMPARISON: None available.    TECHNIQUE:  Endovaginal pelvic sonogram as per order. Transabdominal pelvic sonogram was also performed as part of our standard protocol. Color and spectral Doppler was performed to assess the endometrium/adjacent myometrium.    FINDINGS:    Uterus: 9.2 cm x 6.5 cm x 12.0 cm. . Enlarged and heterogeneous in appearance with linear shadowing. A subserosal fundal fibroid on the left measuring 4.2 x 4.1 x 3.8 cm. Large calcification measuring up to 1.4 cmwithin the posterior uterine body. There is a 2.4 cm x 2.1 cm x 2.1 cm fundal intramural leiomyoma. 3 cm x 2.2 cm x 2.8 cm cervical nabothian cyst containing fine echoes.    Endometrium: 2.3 cm. Heterogeneous. There is irregular thickening of the anterior endometrial lining with infiltration into the anterior myometrium with associated cystic changes measuring up to 1.9 cm in thickness. Arterial vascularity is seen within this region.    A prominent 2.0 cm cervical nabothian cyst with internal debris is seen.    Right ovary: 4.0 x 2.0 x 3.1 cm. A dominant 2.2 cm follicle/cyst is seen within the right ovary. Adjacent smaller septated cyst measuring 1.7 cm x 1.5 cm x 2 cm. Normal arterial and venous waveforms.    Left ovary: 2.8 x 1.6 x 1.5 cm . Within normal limits.    Fluid: Trace simple fluid.    IMPRESSION:    Enlarged/bulky and heterogeneous uterus with asymmetric thickening of the anterior endometrium seen infiltrating the anterior myometrium which may represent changes related to adenomyosis/adenomyoma versus endometrial neoplasm. Further evaluation with pelvic MRI and tissue sampling is suggested.  Heterogeneity within the endometrial cavity likely related to blood products.    Fibroids as above.     50 yo F with h/o fibroids and AUB x 2 months since being started on Brilinta s/p LHC with mLAD MONI (10/2021) for NSTEMI. Pt required blood transfusion in Nov with her last period and now reports similar heavy bleeding x 8 days, changing pads q1 hr. She reports feeling dizzy and SOB which prompted her to come into the ED. She reports regular periods prior to starting anticoagulation. Patient currently on no hormonal treatments and desires hysterectomy for definitive treatment. Per patient, she was recommended MRI for preop planning and has her MRI appt next month.      Name of GYN Physician: Dr. Washington Baugh    PMhx: Diabetes, HTN, NSTEMI s/p LHC with mLAD MONI (10/2021)   PSHx: lap cholecystectomy  Social: denies tobacco, alcohol, recreational drugs   Home meds: Brilinta, metoprolol, ASA, Atorvastatin, Basaglar, Metformin, Ozempic  Allergies: NKDA    Vital Signs Last 24 Hrs  T(C): 36.8 (27 Dec 2021 15:23), Max: 36.8 (27 Dec 2021 15:23)  T(F): 98.3 (27 Dec 2021 15:23), Max: 98.3 (27 Dec 2021 15:23)  HR: 89 (27 Dec 2021 16:42) (89 - 92)  BP: 145/84 (27 Dec 2021 16:42) (100/59 - 145/84)  BP(mean): --  RR: 15 (27 Dec 2021 16:42) (15 - 20)  SpO2: 96% (27 Dec 2021 16:42) (96% - 100%)    Physical Exam:   General: sitting comfortably in bed, NAD, AOx3   CV: RRR S1S2   Abd: Soft, non-tender, non-distended. No rebound or guarding     Pelvic: scant blood in vaginal vault, no active heavy bleeding at os   Ext: non-tender b/l, no edema       LABS:                          6.6    11.78 )-----------( 481      ( 27 Dec 2021 15:56 )             24.3     12-27    138  |  105  |  7   ----------------------------<  172<H>  4.3   |  21<L>  |  0.61    Ca    9.5      27 Dec 2021 15:56    TPro  7.4  /  Alb  4.3  /  TBili  0.2  /  DBili  x   /  AST  18  /  ALT  16  /  AlkPhos  94  12-27  PT/INR - ( 27 Dec 2021 15:56 )   PT: 12.8 sec;   INR: 1.07 ratio    PTT - ( 27 Dec 2021 15:56 )  PTT:27.3 sec      RADIOLOGY & ADDITIONAL STUDIES:    < from: US Transvaginal (11.24.21 @ 14:43) >    EXAM:  US DPLX PELVIC                        EXAM:  US TRANSVAGINAL                        PROCEDURE DATE:  11/24/2021    INTERPRETATION:  CLINICAL INFORMATION: Heavy vaginal bleeding x1 week. No pain. Rule out acute pathology.    LMP: 11/13/2021    COMPARISON: None available.    TECHNIQUE:  Endovaginal pelvic sonogram as per order. Transabdominal pelvic sonogram was also performed as part of our standard protocol. Color and spectral Doppler was performed to assess the endometrium/adjacent myometrium.    FINDINGS:    Uterus: 9.2 cm x 6.5 cm x 12.0 cm. . Enlarged and heterogeneous in appearance with linear shadowing. A subserosal fundal fibroid on the left measuring 4.2 x 4.1 x 3.8 cm. Large calcification measuring up to 1.4 cmwithin the posterior uterine body. There is a 2.4 cm x 2.1 cm x 2.1 cm fundal intramural leiomyoma. 3 cm x 2.2 cm x 2.8 cm cervical nabothian cyst containing fine echoes.    Endometrium: 2.3 cm. Heterogeneous. There is irregular thickening of the anterior endometrial lining with infiltration into the anterior myometrium with associated cystic changes measuring up to 1.9 cm in thickness. Arterial vascularity is seen within this region.    A prominent 2.0 cm cervical nabothian cyst with internal debris is seen.    Right ovary: 4.0 x 2.0 x 3.1 cm. A dominant 2.2 cm follicle/cyst is seen within the right ovary. Adjacent smaller septated cyst measuring 1.7 cm x 1.5 cm x 2 cm. Normal arterial and venous waveforms.    Left ovary: 2.8 x 1.6 x 1.5 cm . Within normal limits.    Fluid: Trace simple fluid.    IMPRESSION:    Enlarged/bulky and heterogeneous uterus with asymmetric thickening of the anterior endometrium seen infiltrating the anterior myometrium which may represent changes related to adenomyosis/adenomyoma versus endometrial neoplasm. Further evaluation with pelvic MRI and tissue sampling is suggested.  Heterogeneity within the endometrial cavity likely related to blood products.    Fibroids as above.

## 2021-12-27 NOTE — ED ADULT NURSE REASSESSMENT NOTE - NS ED NURSE REASSESS COMMENT FT1
Report received from Krystle EUGENE in pink. 2nd PIV obtained for blood transfusion. Patient on cardiac monitor, VSS.

## 2021-12-27 NOTE — CONSULT NOTE ADULT - SUBJECTIVE AND OBJECTIVE BOX
Patient seen and evaluated at bedside    Chief Complaint: Vaginal bleeding    HPI:  This is a 50yo F with history of T2DM on metformin, NSTEMI s/p LHC 10/12/21 with MONI to mLAD who presented to ED today for vaginal bleeding.       PMHx:   Diabetes  HTN (hypertension)  NSTEMI      PSHx:   S/P cholecystectomy      Allergies:  No Known Allergies      Home Meds: see MAR    Current Medications:   norethindrone acetate 5 milliGRAM(s) Oral daily      FAMILY HISTORY:      Social History:  Smoking History:  Alcohol Use:  Drug Use:    REVIEW OF SYSTEMS:  Constitutional:     [x ] negative [ ] fevers [ ] chills [ ] weight loss [ ] weight gain  HEENT:                  [x ] negative [ ] dry eyes [ ] eye irritation [ ] postnasal drip [ ] nasal congestion  CV:                         [ x] negative  [ ] chest pain [ ] orthopnea [ ] palpitations [ ] murmur  Resp:                     [x ] negative [ ] cough [ ] shortness of breath [ ] dyspnea [ ] wheezing [ ] sputum [ ]hemoptysis  GI:                          [ x] negative [ ] nausea [ ] vomiting [ ] diarrhea [ ] constipation [ ] abd pain [ ] dysphagia   :                        [ x] negative [ ] dysuria [ ] nocturia [ ] hematuria [ ] increased urinary frequency  Musculoskeletal: [x ] negative [ ] back pain [ ] myalgias [ ] arthralgias [ ] fracture  Skin:                       [ x] negative [ ] rash [ ] itch  Neurological:        [ x] negative [ ] headache [ ] dizziness [ ] syncope [ ] weakness [ ] numbness  Psychiatric:           [ x] negative [ ] anxiety [ ] depression  Endocrine:            [ x] negative [ ] diabetes [ ] thyroid problem  Heme/Lymph:      [ x] negative [ ] anemia [ ] bleeding problem  Allergic/Immune: [ x] negative [ ] itchy eyes [ ] nasal discharge [ ] hives [ ] angioedema    [ x] All other systems negative  [ ] Unable to assess ROS due to      Physical Exam:  T(F): 98.1 (12-27), Max: 98.3 (12-27)  HR: 83 (12-27) (77 - 92)  BP: 109/72 (12-27) (100/59 - 145/84)  RR: 15 (12-27)  SpO2: 98% (12-27)  GENERAL: No acute distress, well-developed  HEAD:  Atraumatic, Normocephalic  ENT: EOMI, PERRLA, conjunctiva and sclera clear, Neck supple, No JVD, moist mucosa  CHEST/LUNG: Clear to auscultation bilaterally; No wheeze, equal breath sounds bilaterally   BACK: No spinal tenderness  HEART: Regular rate and rhythm; No murmurs, rubs, or gallops  ABDOMEN: Soft, Nontender, Nondistended; Bowel sounds present  EXTREMITIES:  No clubbing, cyanosis, or edema  PSYCH: Nl behavior, nl affect  NEUROLOGY: AAOx3, non-focal, cranial nerves intact  SKIN: Normal color, No rashes or lesions  LINES:    Cardiovascular Diagnostic Testing:    ECG: Personally reviewed:    Echo: Personally reviewed:    Stress Testing:    Cath:    Imaging:    CXR: Personally reviewed    Labs: Personally reviewed                        6.6    11.78 )-----------( 481      ( 27 Dec 2021 15:56 )             24.3     12-27    138  |  105  |  7   ----------------------------<  172<H>  4.3   |  21<L>  |  0.61    Ca    9.5      27 Dec 2021 15:56    TPro  7.4  /  Alb  4.3  /  TBili  0.2  /  DBili  x   /  AST  18  /  ALT  16  /  AlkPhos  94  12-27    PT/INR - ( 27 Dec 2021 15:56 )   PT: 12.8 sec;   INR: 1.07 ratio         PTT - ( 27 Dec 2021 15:56 )  PTT:27.3 sec         Patient seen and evaluated at bedside    Chief Complaint: Vaginal bleeding    HPI:  This is a 52yo F with history of T2DM on metformin, NSTEMI s/p [facility] 10/12/21 with MONI to mLAD, uterine fibroids with abnormal uterine bleeding who presented to ED today for vaginal bleeding. Had similar presentation in November requiring pRBC transfusion. Reports cyclic heavy bleeding with periods for the past 2 months. Most recent episode of bleeding started ~14 days ago. HD stable. Some lightheadedness but no syncope. Has otherwise been feeling well. Denies fevers, chills, chest pain, shortness of breath, abdominal pain, n/v, orthopnea, PND sxs, lower extremity edema. Currently HD stable. Hgb 6.6 from 9.1 11/25. Currently being transfused with 2u pRBCs.     Seen by Gynecology in ED. Recent endometrial biopsy was benign. Started on Norethindrone (progesterone) with plan for outpatient follow up by Gyn. Scheduled for MRI appointment in Jan for better evaluation of endometrium. Patient would eventually like hysterectomy to treat her heavy menses. No plan for interventions during this admission.     Cardiology consulted for DAPT recommendation and Hgb transfusion goal in setting of CAD. [facility] 10/12/21 with MONI to mLAD with Dr. Ugalde. Currently compliant on Aspirin 81mg and Brilinta 90mg BID.       PMHx:   Diabetes  HTN (hypertension)  NSTEMI      PSHx:   S/P cholecystectomy      Allergies:  No Known Allergies      Home Meds: see MAR    Current Medications:   norethindrone acetate 5 milliGRAM(s) Oral daily      Social History:  Smoking History: denies  Alcohol Use: denies  Drug Use: denies    REVIEW OF SYSTEMS:  Constitutional:     [x ] negative [ ] fevers [ ] chills [ ] weight loss [ ] weight gain  HEENT:                  [x ] negative [ ] dry eyes [ ] eye irritation [ ] postnasal drip [ ] nasal congestion  CV:                         [ x] negative  [ ] chest pain [ ] orthopnea [ ] palpitations [ ] murmur  Resp:                     [x ] negative [ ] cough [ ] shortness of breath [ ] dyspnea [ ] wheezing [ ] sputum [ ]hemoptysis  GI:                          [ x] negative [ ] nausea [ ] vomiting [ ] diarrhea [ ] constipation [ ] abd pain [ ] dysphagia   :                        [ x] negative [ ] dysuria [ ] nocturia [ ] hematuria [ ] increased urinary frequency  Musculoskeletal: [x ] negative [ ] back pain [ ] myalgias [ ] arthralgias [ ] fracture  Skin:                       [ x] negative [ ] rash [ ] itch  Neurological:        [ x] negative [ ] headache [ ] dizziness [ ] syncope [ ] weakness [ ] numbness  Psychiatric:           [ x] negative [ ] anxiety [ ] depression  Endocrine:            [ x] negative [ ] diabetes [ ] thyroid problem  Heme/Lymph:      [ x] negative [ ] anemia [ ] bleeding problem  Allergic/Immune: [ x] negative [ ] itchy eyes [ ] nasal discharge [ ] hives [ ] angioedema    [ x] All other systems negative  [ ] Unable to assess ROS due to      Physical Exam:  T(F): 98.1 (12-27), Max: 98.3 (12-27)  HR: 83 (12-27) (77 - 92)  BP: 109/72 (12-27) (100/59 - 145/84)  RR: 15 (12-27)  SpO2: 98% (12-27)  GENERAL: No acute distress, well-developed  HEAD:  Atraumatic, Normocephalic  ENT: EOMI, conjunctiva and sclera clear, Neck supple, No JVD, moist mucosa  CHEST/LUNG: Clear to auscultation bilaterally; No wheeze, equal breath sounds bilaterally   HEART: Regular rate and rhythm; No murmurs, rubs, or gallops  ABDOMEN: Soft, Nontender, Nondistended; Bowel sounds present  EXTREMITIES:  No clubbing, cyanosis, or edema  PSYCH: Nl behavior, nl affect  NEUROLOGY: AAOx3, non-focal, cranial nerves intact    Cardiovascular Diagnostic Testing:    ECG: Personally reviewed:  Normal sinus rhythm, rate 91, non-specific T wave changes, no ST elevations or depressions     Echo: Personally reviewed:  TTE 10/12/21:  Conclusions:  1. Normal left ventricular internal dimensions and wall  thicknesses.  2. Mild segmental left ventricular systolic dysfunction.  Endocardial visualization enhanced with intravenous  injection of Ultrasonic Enhancing Agent (Definity). No left  ventricular thrombus.  3. Mild diastolic dysfunction (Stage I).  4. Normal right ventricular size and function.  *** No previous Echo exam.    Cath:  [facility] 10/12/21 with MONI to mLAD with Dr. Ugalde    Imaging:    CXR: Personally reviewed    Labs: Personally reviewed                        6.6    11.78 )-----------( 481      ( 27 Dec 2021 15:56 )             24.3     12-27    138  |  105  |  7   ----------------------------<  172<H>  4.3   |  21<L>  |  0.61    Ca    9.5      27 Dec 2021 15:56    TPro  7.4  /  Alb  4.3  /  TBili  0.2  /  DBili  x   /  AST  18  /  ALT  16  /  AlkPhos  94  12-27    PT/INR - ( 27 Dec 2021 15:56 )   PT: 12.8 sec;   INR: 1.07 ratio         PTT - ( 27 Dec 2021 15:56 )  PTT:27.3 sec

## 2021-12-27 NOTE — ED PROVIDER NOTE - RAPID ASSESSMENT
52 y/o female DM, HLD presenting for vaginal bleeding x 1.5 weeks. she called her ob/gyn but has not heard back. patient reports she is changing pad > 1x per hour. hx fibroids. + dizziness and chest discomfort. last month required transfusion and felt the exact same symptoms as she does presently.

## 2021-12-27 NOTE — CONSULT NOTE ADULT - ASSESSMENT
50 yo F with hx of fibroids and AUB x2 months since starting anticoagulation s/p LHC with mLAD MONI for NSTEMI. Pt reports cyclic heavy bleeding with periods for the past 2 months, requiring transfusion also with her last period in Nov. Patient currently hemodynamically stable with scant bleeding on speculum exam and vitals wnl. H/H 6.6/24.3 like from heavy menses.   - Agree with 2u pRBC transfusion   - Recommend Aygestin 5mg x10 days   - Pt scheduled for MRI appointment in Jan for better evaluation of endometrium per last US read showing bulky uterus with asymmetric thickening of endometrium infiltrating into anterior myometrium, r/o neoplasm   - OBGYN office will call patient to schedule outpt follow-up for further management of AUB, endometrial sampling, surgical planning     E Cherelle PGY4  d/w Dr. Delarosa      52 yo F with hx of fibroids and AUB x2 months since starting anticoagulation s/p LHC with mLAD MONI for NSTEMI. Pt reports cyclic heavy bleeding with periods for the past 2 months, requiring transfusion also with her last period in Nov. Patient currently hemodynamically stable with scant bleeding on speculum exam and vitals wnl. H/H 6.6/24.3 like from heavy menses.   - Agree with 2u pRBC transfusion   - Recommend Aygestin 5mg x10 days   - Pt scheduled for MRI appointment in Jan for better evaluation of endometrium per last US read showing bulky uterus with asymmetric thickening of endometrium infiltrating into anterior myometrium, r/o neoplasm   - OBGYN office will call patient to schedule outpt follow-up for further management of AUB, endometrial sampling, surgical planning     E Marvinirefady PGY4  d/w Dr. Washington Baugh

## 2021-12-27 NOTE — ED PROVIDER NOTE - NSFOLLOWUPINSTRUCTIONS_ED_ALL_ED_FT
You were seen in the Emergency Department for bleeding.     1) Advance activity as tolerated.   2) Continue all previously prescribed medications as directed.    3) Follow up with your primary care physician in 24-48 hours - take copies of your results.    4) Return to the Emergency Department for worsening or persistent symptoms, and/or ANY NEW OR CONCERNING SYMPTOMS.      please take the aygestin which was sent to your pharmacy once a day for 10 days. Your obstetricians office will call you to make an appointment.     if you feel worsening shortness of breath, chest pain, dizziness, or feel like you will faint, or the bleeding does not stop, please call an ambulance and return to the ER.

## 2021-12-27 NOTE — ED ADULT NURSE NOTE - OBJECTIVE STATEMENT
51 year old female PMH of DM presents to the ED complaining of vaginal bleeding x 1.5 weeks (unable to reach outpatient obgyn sooner), today endorsing soaking through 1.5 pads/hr. Patient was seen by qdoc provider and RN- had IV placed and labs drawn and sent to lab - found to have hemoglobin of 6.6. Patient is well appearing, ambulated independently to [place] 52 from Triage. VSS. Pt. endorses lightheadedness and lower abdominal cramping. Pt. denies fever/chills, nausea, vomiting, diarrhea, dysuria, diarrhea. Patient undressed and placed into gown, call bell in hand and side rails up with bed in lowest position for safety. blanket provided. Comfort and safety provided.

## 2021-12-27 NOTE — CONSULT NOTE ADULT - ATTENDING COMMENTS
Patient seen examined  Discussed with Dr. Ugalde and Patient  Active bleeding after recent PCI LAD  She can be discharged home with the following instructions.  She will continue brilinta until the day she picks up Clopidogrel (ideally today) - she will take her last dose of Brilinta that night and then start Clopidogrel 300 Loading dose the following morning to be continued with aspirin. The day after the 300 mg loading dose - she should continue with 75 mg clopidogrel daily and ASA daily.                                                                                             Eighty minutes spent in direct patient care and communication

## 2021-12-27 NOTE — ED PROVIDER NOTE - CARE PLAN
1 Principal Discharge DX:	Anemia due to acute blood loss  Secondary Diagnosis:	Dysfunctional uterine bleeding

## 2021-12-27 NOTE — CONSULT NOTE ADULT - ASSESSMENT
This is a 50yo F with history of T2DM on metformin, NSTEMI s/p [facility] 10/12/21 with MONI to mLAD, uterine fibroids with abnormal uterine bleeding who presented to ED for vaginal bleeding secondary to heavy menses. HD stable. Hgb 6.6 from 9.1 11/25. Being transfused 2u pRBCs. Seen by Gynecology in ED. Recent endometrial biopsy was benign. Started on Norethindrone (progesterone) with plan for outpatient follow up by Gyn. Scheduled for MRI appointment in Jan for better evaluation of endometrium. Patient would eventually like hysterectomy to treat her heavy menses. No plan for interventions during this admission. Cardiology consulted for DAPT recommendation and Hgb transfusion goal in setting of CAD. [facility] 10/12/21 with MONI to mLAD with Dr. Ugalde. Currently compliant on Aspirin 81mg and Brilinta 90mg BID.     Plan:  - Continue Aspirin 81mg and Brilinta 90mg BID. She is less then three months out from her PCI and should continue DAPT to prevent stent thrombosis  - Transfuse for Hgb goal > 8. Her vaginal bleeding has been occurring with her menstrual cycles over the past two months and is not an acute process. Currently HD stable       Ovi Jackson MD  Cardiology Fellow - PGY 4  Text or Call: [phone]  For all New Consults and Questions:  [email or web address]   Login: [email or web address]   This is a 52yo F with history of T2DM on metformin, NSTEMI s/p [facility] 10/12/21 with MONI to mLAD, uterine fibroids with abnormal uterine bleeding who presented to ED for vaginal bleeding secondary to heavy menses. HD stable. Hgb 6.6 from 9.1 11/25. Being transfused 2u pRBCs. Seen by Gynecology in ED. Recent endometrial biopsy was benign. Started on Norethindrone (progesterone) with plan for outpatient follow up by Gyn. Scheduled for MRI appointment in Jan for better evaluation of endometrium. Patient would eventually like hysterectomy to treat her heavy menses. No plan for interventions during this admission. Cardiology consulted for DAPT recommendation and Hgb transfusion goal in setting of CAD. [facility] 10/12/21 with MONI to mLAD with Dr. Ugalde. Currently compliant on Aspirin 81mg and Brilinta 90mg BID.     Plan:  - Continue Aspirin 81mg and Brilinta 90mg BID. She is less then three months out from her PCI and should continue DAPT to prevent stent thrombosis  - Transfuse for Hgb goal > 8. Her vaginal bleeding has been occurring with her menstrual cycles over the past two months and is not an acute process. Currently HD stable   - Will need outpatient Cardiology follow up at discharge       Ovi Jackson MD  Cardiology Fellow - PGY 4  Text or Call: [phone]  For all New Consults and Questions:  [email or web address]   Login: [email or web address]   This is a 52yo F with history of T2DM on metformin, NSTEMI s/p [facility] 10/12/21 with MONI to mLAD, uterine fibroids with abnormal uterine bleeding who presented to ED for vaginal bleeding secondary to heavy menses. HD stable. Hgb 6.6 from 9.1 11/25. Being transfused 2u pRBCs. Seen by Gynecology in ED. Recent endometrial biopsy was benign. Started on Norethindrone (progesterone) with plan for outpatient follow up by Gyn. Scheduled for MRI appointment in Jan for better evaluation of endometrium. Patient would eventually like hysterectomy to treat her heavy menses. No plan for interventions during this admission. Cardiology consulted for DAPT recommendation and Hgb transfusion goal in setting of CAD. [facility] 10/12/21 with MONI to mLAD with Dr. Ugalde. Currently compliant on Aspirin 81mg and Brilinta 90mg BID.     Plan:  - Continue Aspirin 81mg and Brilinta 90mg BID. She is less then three months out from her PCI and should continue DAPT to prevent stent thrombosis  - Transfuse for Hgb goal > 8. Her vaginal bleeding has been occurring with her menstrual cycles over the past two months and is not an acute process. Currently HD stable   - Will need outpatient Cardiology follow up at discharge (seen previously by Dr. Ugalde outpatient)       Ovi Jackson MD  Cardiology Fellow - PGY 4  Text or Call: [phone]  For all New Consults and Questions:  [email or web address]   Login: [email or web address]

## 2021-12-27 NOTE — ED PROVIDER NOTE - PHYSICAL EXAMINATION
General: non-toxic, NAD  HEENT: NCAT, PERRL +conjunctival pallor  Cardiac: RRR, no murmurs, 2+ peripheral pulses  Resp: CTAB  Abdomen: soft, non-distended, bowel sounds present, no ttp, no rebound or guarding. no organomegaly  Extremities: no peripheral edema, calf tenderness, or leg size discrepancies  Skin: no rashes  Neuro: AAOx4, 5+motor, sensation grossly intact  Psych: mood and affect appropriate

## 2021-12-27 NOTE — H&P ADULT - CLICK TO LAUNCH ORM
Patient called in BS readings  3/9       3/10        3/11        3/12        3/13  65          87          68          62            67  133       129         112        99           142  147       133         155       145          160  259       218          230       230          189--------taking 40 units qam and 30 units qpm, 6 units ac lunch and dinner. per Dr. Zenon Smart patient to decrease his night dose if levemir to 24 units. .

## 2021-12-27 NOTE — ED PROVIDER NOTE - ATTENDING CONTRIBUTION TO CARE
------------ATTENDING NOTE------------  pt c/o 2 wks of daily uterine bleeding, most times same/heavier than typical menstruation, has mild  occasional pelvic crampy pain, feeling increased fatigue general malaise, no additional abnormal bleeding/bruising, acutely anemic today, has required past blood transfusions, complicated as on Brilinta for CAD, currently HD stable, awaiting labs/imaging and close reassessments -->  - Stephen Recinos MD   ----------------------------------------------

## 2021-12-27 NOTE — ED PROVIDER NOTE - PROGRESS NOTE DETAILS
tony pgy1: I called pharmacy [facility] does not have the reversal agent for brilenta. tony pgy1: spoke to ob resident. attending suggests aygestin 5mg qD x10d one in ED and dc after transfusion of 2 units. tony pgy1: called pharmacy for help ordering the aygestin as requested by OB. Order placed for 5mg qD by pharmacist pelvic exam +active bleeding OB attending danita has seen pt at bedside. requesting cardiology consultation for Hb goal and DAPT. Alexandre PGY1 - spoke to cardiology fellow. Pt cannot be transitioned off of her DAPT as the stent was placed in october. cardiology would be willing to transition in the short-term for surgery, however no surgical intervention is planned this admission per Dr Delarosa (OB). With recent MI, fellow suggests Hb goal of 8 or higher. Fellow will see pt in ER. no CDU beds available. medicine v OBGYN admission.. awaiting return of page from ob.

## 2021-12-27 NOTE — ED CLERICAL - NS ED CLERK NOTE PRE-ARRIVAL INFORMATION; ADDITIONAL PRE-ARRIVAL INFORMATION
CC/Reason For referral:  heavy vaginal bleed, saturating as pad an hour, clots the size of strawberries, weakness and dizzy when going from sitting to standing   Preferred Consultant(if applicable):  gyn  Who admits for you (if needed):  Do you have documents you would like to fax over?  Would you still like to speak to an ED attending? please call after patient is seen

## 2021-12-27 NOTE — H&P ADULT - NSHPLABSRESULTS_GEN_ALL_CORE
6.6    11.78 )-----------( 481      ( 27 Dec 2021 15:56 )             24.3       12-27    138  |  105  |  7   ----------------------------<  172<H>  4.3   |  21<L>  |  0.61    Ca    9.5      27 Dec 2021 15:56    TPro  7.4  /  Alb  4.3  /  TBili  0.2  /  DBili  x   /  AST  18  /  ALT  16  /  AlkPhos  94  12-27                  PT/INR - ( 27 Dec 2021 15:56 )   PT: 12.8 sec;   INR: 1.07 ratio         PTT - ( 27 Dec 2021 15:56 )  PTT:27.3 sec          CAPILLARY BLOOD GLUCOSE    RADIOLOGY & ADDITIONAL STUDIES:    < from: US Transvaginal (11.24.21 @ 14:43) >    EXAM:  US DPLX PELVIC                        EXAM:  US TRANSVAGINAL                        PROCEDURE DATE:  11/24/2021    INTERPRETATION:  CLINICAL INFORMATION: Heavy vaginal bleeding x1 week. No pain. Rule out acute pathology.    LMP: 11/13/2021    COMPARISON: None available.    TECHNIQUE:  Endovaginal pelvic sonogram as per order. Transabdominal pelvic sonogram was also performed as part of our standard protocol. Color and spectral Doppler was performed to assess the endometrium/adjacent myometrium.    FINDINGS:    Uterus: 9.2 cm x 6.5 cm x 12.0 cm. . Enlarged and heterogeneous in appearance with linear shadowing. A subserosal fundal fibroid on the left measuring 4.2 x 4.1 x 3.8 cm. Large calcification measuring up to 1.4 cmwithin the posterior uterine body. There is a 2.4 cm x 2.1 cm x 2.1 cm fundal intramural leiomyoma. 3 cm x 2.2 cm x 2.8 cm cervical nabothian cyst containing fine echoes.    Endometrium: 2.3 cm. Heterogeneous. There is irregular thickening of the anterior endometrial lining with infiltration into the anterior myometrium with associated cystic changes measuring up to 1.9 cm in thickness. Arterial vascularity is seen within this region.    A prominent 2.0 cm cervical nabothian cyst with internal debris is seen.    Right ovary: 4.0 x 2.0 x 3.1 cm. A dominant 2.2 cm follicle/cyst is seen within the right ovary. Adjacent smaller septated cyst measuring 1.7 cm x 1.5 cm x 2 cm. Normal arterial and venous waveforms.    Left ovary: 2.8 x 1.6 x 1.5 cm . Within normal limits.    Fluid: Trace simple fluid.    IMPRESSION:    Enlarged/bulky and heterogeneous uterus with asymmetric thickening of the anterior endometrium seen infiltrating the anterior myometrium which may represent changes related to adenomyosis/adenomyoma versus endometrial neoplasm. Further evaluation with pelvic MRI and tissue sampling is suggested.  Heterogeneity within the endometrial cavity likely related to blood products.    Fibroids as above.

## 2021-12-27 NOTE — CONSULT NOTE ADULT - ATTENDING COMMENTS
Patient discussed with resident. Agree with note above. Pt a 50yo w complex cardiac history s/p stent placement for MI. currently on anticoagulation, with heavy vaginal bleeding. Patient currently HDS, with stable VS. H/H showing acute blood loss anemia. SSE per resident with out active brisk bleeding. Pt with recent Embx (benign) awaiting pelvic US for surgical planning as pt desires hysterectomy. Agree with 2units pRBC transfusion. Agree with Norethindrone 5mg Qday with close outpatient f/u (to be scheduled by office) this week for long term planning. Patient discussed with resident. Agree with note above. Pt a 52yo w complex cardiac history s/p stent placement for MI. currently on anticoagulation, with heavy vaginal bleeding. Patient currently HDS, with stable VS. H/H showing acute blood loss anemia. SSE per resident with out active brisk bleeding. Pt with recent Embx (benign) awaiting pelvic US for surgical planning as pt desires hysterectomy. Agree with 2units pRBC transfusion. Agree with Norethindrone 5mg Qday with close outpatient f/u (to be scheduled by office) this week for long term planning.      ----    addendum: pt seen as with persistent vaginal bleeding. SSE by notewriter noted to have about 50cc of clot in vaginal vault. per pt saturating ~1 pad q1.5hrs. Reviewed given complex medical history, will obtain cards consult to discuss Hg goal (now s/p 2 units of pRBC) as well as anticoagulation and if at all possible to transition to different anticoagulation given heavy bleeding with menses now requiring 2 ED visit with blood transfusion. pt started norethindrone x 1 in ED. will cont and consider uptitration to BID pending bleeding. will admit pt for obs with strict pad count, AM CBC and f/u on final cards recs in AM. Pt agreeable with plan.

## 2021-12-28 ENCOUNTER — TRANSCRIPTION ENCOUNTER (OUTPATIENT)
Age: 51
End: 2021-12-28

## 2021-12-28 VITALS
SYSTOLIC BLOOD PRESSURE: 100 MMHG | OXYGEN SATURATION: 99 % | TEMPERATURE: 98 F | HEART RATE: 80 BPM | RESPIRATION RATE: 18 BRPM | DIASTOLIC BLOOD PRESSURE: 63 MMHG

## 2021-12-28 LAB
BASOPHILS # BLD AUTO: 0.08 K/UL — SIGNIFICANT CHANGE UP (ref 0–0.2)
BASOPHILS NFR BLD AUTO: 0.6 % — SIGNIFICANT CHANGE UP (ref 0–2)
EOSINOPHIL # BLD AUTO: 0.09 K/UL — SIGNIFICANT CHANGE UP (ref 0–0.5)
EOSINOPHIL NFR BLD AUTO: 0.6 % — SIGNIFICANT CHANGE UP (ref 0–6)
GLUCOSE BLDC GLUCOMTR-MCNC: 127 MG/DL — HIGH (ref 70–99)
GLUCOSE BLDC GLUCOMTR-MCNC: 129 MG/DL — HIGH (ref 70–99)
HCT VFR BLD CALC: 29.2 % — LOW (ref 34.5–45)
HGB BLD-MCNC: 9.3 G/DL — LOW (ref 11.5–15.5)
IMM GRANULOCYTES NFR BLD AUTO: 0.4 % — SIGNIFICANT CHANGE UP (ref 0–1.5)
LYMPHOCYTES # BLD AUTO: 16.8 % — SIGNIFICANT CHANGE UP (ref 13–44)
LYMPHOCYTES # BLD AUTO: 2.32 K/UL — SIGNIFICANT CHANGE UP (ref 1–3.3)
MCHC RBC-ENTMCNC: 25.3 PG — LOW (ref 27–34)
MCHC RBC-ENTMCNC: 31.8 GM/DL — LOW (ref 32–36)
MCV RBC AUTO: 79.6 FL — LOW (ref 80–100)
MONOCYTES # BLD AUTO: 0.84 K/UL — SIGNIFICANT CHANGE UP (ref 0–0.9)
MONOCYTES NFR BLD AUTO: 6.1 % — SIGNIFICANT CHANGE UP (ref 2–14)
NEUTROPHILS # BLD AUTO: 10.46 K/UL — HIGH (ref 1.8–7.4)
NEUTROPHILS NFR BLD AUTO: 75.5 % — SIGNIFICANT CHANGE UP (ref 43–77)
NRBC # BLD: 0 /100 WBCS — SIGNIFICANT CHANGE UP (ref 0–0)
PLATELET # BLD AUTO: 447 K/UL — HIGH (ref 150–400)
RBC # BLD: 3.67 M/UL — LOW (ref 3.8–5.2)
RBC # FLD: 19.9 % — HIGH (ref 10.3–14.5)
WBC # BLD: 13.85 K/UL — HIGH (ref 3.8–10.5)
WBC # FLD AUTO: 13.85 K/UL — HIGH (ref 3.8–10.5)

## 2021-12-28 PROCEDURE — 99233 SBSQ HOSP IP/OBS HIGH 50: CPT

## 2021-12-28 RX ORDER — CLOPIDOGREL BISULFATE 75 MG/1
4 TABLET, FILM COATED ORAL
Qty: 4 | Refills: 0
Start: 2021-12-28

## 2021-12-28 RX ORDER — METOPROLOL TARTRATE 50 MG
25 TABLET ORAL DAILY
Refills: 0 | Status: DISCONTINUED | OUTPATIENT
Start: 2021-12-28 | End: 2021-12-28

## 2021-12-28 RX ORDER — CLOPIDOGREL BISULFATE 75 MG/1
1 TABLET, FILM COATED ORAL
Qty: 1 | Refills: 0
Start: 2021-12-28

## 2021-12-28 RX ORDER — CLOPIDOGREL BISULFATE 75 MG/1
1 TABLET, FILM COATED ORAL
Qty: 30 | Refills: 2
Start: 2021-12-28 | End: 2022-03-27

## 2021-12-28 RX ORDER — NORETHINDRONE 0.35 MG/1
1 TABLET ORAL
Qty: 9 | Refills: 0
Start: 2021-12-28 | End: 2022-01-05

## 2021-12-28 RX ORDER — TICAGRELOR 90 MG/1
90 TABLET ORAL EVERY 12 HOURS
Refills: 0 | Status: DISCONTINUED | OUTPATIENT
Start: 2021-12-28 | End: 2021-12-28

## 2021-12-28 RX ADMIN — Medication 81 MILLIGRAM(S): at 11:07

## 2021-12-28 RX ADMIN — METFORMIN HYDROCHLORIDE 1000 MILLIGRAM(S): 850 TABLET ORAL at 00:22

## 2021-12-28 RX ADMIN — TICAGRELOR 60 MILLIGRAM(S): 90 TABLET ORAL at 00:22

## 2021-12-28 RX ADMIN — NORETHINDRONE 5 MILLIGRAM(S): 0.35 TABLET ORAL at 11:04

## 2021-12-28 RX ADMIN — INSULIN GLARGINE 30 UNIT(S): 100 INJECTION, SOLUTION SUBCUTANEOUS at 00:20

## 2021-12-28 RX ADMIN — Medication 3.75 MILLIGRAM(S): at 11:04

## 2021-12-28 RX ADMIN — Medication 25 MILLIGRAM(S): at 05:27

## 2021-12-28 NOTE — DISCHARGE NOTE PROVIDER - NSDCMRMEDTOKEN_GEN_ALL_CORE_FT
alcohol swabs : Apply topically to affected area 4 times a day   aspirin 81 mg oral tablet, chewable: 1 tab(s) orally once a day  atorvastatin 80 mg oral tablet: 1 tab(s) orally once a day (at bedtime)  atorvastatin 80 mg oral tablet: 1 tab(s) orally once a day MDD:take one tablet daily  Aygestin 5 mg oral tablet: 1 tab(s) orally once a day   Basaglar KwikPen 100 units/mL subcutaneous solution: 30 unit(s) subcutaneous once a day (at bedtime) MDD:1   glucometer (per patient&#x27;s insurance): Test blood sugars four times a day. Dispense #1 glucometer.  glucose tablets: Follow instructions on bottle when sugar is low.  Insulin Pen Needles, 4mm: 1 application subcutaneously 4 times a day. ** Use with insulin pen **   lancets: 1 application subcutaneously 4 times a day   metFORMIN 1000 mg oral tablet: 1 tab(s) orally 2 times a day  metoprolol succinate 25 mg oral tablet, extended release: 1 tab(s) orally once a day  Ozempic 2 mg/1.5 mL (0.25 mg or 0.5 mg dose) subcutaneous solution: 0.5 milligram(s) subcutaneously once a week   test strips (per patient&#x27;s insurance): 1 application subcutaneously 4 times a day. ** Compatible with patient&#x27;s glucometer **  ticagrelor 90 mg oral tablet: 1 tab(s) orally every 12 hours   alcohol swabs : Apply topically to affected area 4 times a day   aspirin 81 mg oral tablet, chewable: 1 tab(s) orally once a day  atorvastatin 80 mg oral tablet: 1 tab(s) orally once a day (at bedtime)  atorvastatin 80 mg oral tablet: 1 tab(s) orally once a day MDD:take one tablet daily  Aygestin 5 mg oral tablet: 1 tab(s) orally once a day   Basaglar KwikPen 100 units/mL subcutaneous solution: 30 unit(s) subcutaneous once a day (at bedtime) MDD:1   clopidogrel 300 mg oral tablet: 1 tab(s) orally once morning following last dose of Brilinta the night before.   clopidogrel 75 mg oral tablet: 1 tab(s) orally once a day. Start the day after loading dose of 300mg Clopidogrel loading dose.  glucometer (per patient&#x27;s insurance): Test blood sugars four times a day. Dispense #1 glucometer.  glucose tablets: Follow instructions on bottle when sugar is low.  Insulin Pen Needles, 4mm: 1 application subcutaneously 4 times a day. ** Use with insulin pen **   lancets: 1 application subcutaneously 4 times a day   metFORMIN 1000 mg oral tablet: 1 tab(s) orally 2 times a day  metoprolol succinate 25 mg oral tablet, extended release: 1 tab(s) orally once a day  Ozempic 2 mg/1.5 mL (0.25 mg or 0.5 mg dose) subcutaneous solution: 0.5 milligram(s) subcutaneously once a week   test strips (per patient&#x27;s insurance): 1 application subcutaneously 4 times a day. ** Compatible with patient&#x27;s glucometer **  ticagrelor 90 mg oral tablet: 1 tab(s) orally every 12 hours   alcohol swabs : Apply topically to affected area 4 times a day   aspirin 81 mg oral tablet, chewable: 1 tab(s) orally once a day  atorvastatin 80 mg oral tablet: 1 tab(s) orally once a day (at bedtime)  atorvastatin 80 mg oral tablet: 1 tab(s) orally once a day MDD:take one tablet daily  Aygestin 5 mg oral tablet: 1 tab(s) orally once a day   Basaglar KwikPen 100 units/mL subcutaneous solution: 30 unit(s) subcutaneous once a day (at bedtime) MDD:1   clopidogrel 75 mg oral tablet: 1 tab(s) orally once a day. Start the day after loading dose of 300mg Clopidogrel loading dose.  clopidogrel 75 mg oral tablet: 4 tab(s) orally once for a total of 300mg loading dose the morning following last dose of Brilinta the night before.   glucometer (per patient&#x27;s insurance): Test blood sugars four times a day. Dispense #1 glucometer.  glucose tablets: Follow instructions on bottle when sugar is low.  Insulin Pen Needles, 4mm: 1 application subcutaneously 4 times a day. ** Use with insulin pen **   lancets: 1 application subcutaneously 4 times a day   metFORMIN 1000 mg oral tablet: 1 tab(s) orally 2 times a day  metoprolol succinate 25 mg oral tablet, extended release: 1 tab(s) orally once a day  Ozempic 2 mg/1.5 mL (0.25 mg or 0.5 mg dose) subcutaneous solution: 0.5 milligram(s) subcutaneously once a week   test strips (per patient&#x27;s insurance): 1 application subcutaneously 4 times a day. ** Compatible with patient&#x27;s glucometer **  ticagrelor 90 mg oral tablet: 1 tab(s) orally every 12 hours

## 2021-12-28 NOTE — DISCHARGE NOTE PROVIDER - CARE PROVIDER_API CALL
Chetna Meek; MPH)  OBSN  General [address] [place] [address]  Phone: [phone]  Fax: [phone]  Follow Up Time: Routine   Chetna Meek; MPH)  OBSN  General [id number] [place] [address]  Phone: [phone]  Fax: [phone]  Follow Up Time: Routine    Dmitriy Ugalde)  Cardiology; Internal Medicine; Interventional Cardiology  [address] [place] [address]  Phone: [phone]  Fax: [phone]  Follow Up Time:

## 2021-12-28 NOTE — DISCHARGE NOTE PROVIDER - NSDCFUADDINST_GEN_ALL_CORE_FT
If you soak through more than 1 pad/hour, return to the emergency department for further evaluation. Continue to take Aygestin 5mg for a total of 10 days.

## 2021-12-28 NOTE — DISCHARGE NOTE PROVIDER - HOSPITAL COURSE
51 year old female admitted to the hospital with heavy uterine bleeding in the setting of anticoagulation (brilinta and ASA) s/p drug eluting stent 2/2 NSTEMI two months ago. On admission the patient reported that she was going through one pad per hour and passing large clots. Patient also reported dyspnea on exertion as well as dizziness. Patient received a chest x-ray for shortness of breath, which showed clear lungs. Patient also received 2uPRBCs for her low hemoglobin. Patient was started on Aygestin 5mg for 10 days in ED. Patient was admitted to the gynecology service for further monitoring of her heavy uterine bleeding. Cardiology saw the patient, and they recommended continuing anticoagulation in the setting of recent drug eluting stent.  H/H: 6.6/24.3->2uPRBC->  On HD#1, pt was discharged in stable condition. She was ambulating, tolerating po and voiding spontaneously. Pt to have close f/u w/ Dr. Delarosa in clinic. Patient scheduled to receive MRI in January for preoperative planning for her heavy uterine bleeding. 51 year old female admitted to the hospital with heavy uterine bleeding in the setting of anticoagulation (brilinta and ASA) s/p drug eluting stent 2/2 NSTEMI two months ago. On admission the patient reported that she was going through one pad per hour and passing large clots. Patient also reported dyspnea on exertion as well as dizziness. Patient received a chest x-ray for shortness of breath, which showed clear lungs. Patient also received 2uPRBCs for her low hemoglobin. Patient was started on Aygestin 5mg for 10 days in ED. Patient was admitted to the gynecology service for further monitoring of her heavy uterine bleeding. Cardiology saw the patient, and they recommended continuing anticoagulation in the setting of recent drug eluting stent.  H/H: 6.6/24.3->2uPRBC->29.2  On HD#1, patient received 3.75mg IM Lupron and was subsequently discharged in stable condition. She was ambulating, tolerating po and voiding spontaneously. Instructed patient to continue Aygestin 5mg daily to complete 10-day course. Pt to have close f/u w/ Dr. Delarosa in clinic. Patient scheduled to receive MRI in January for preoperative planning for her heavy uterine bleeding. 51 year old female admitted to the hospital with heavy uterine bleeding in the setting of anticoagulation (brilinta and ASA) s/p drug eluting stent 2/2 NSTEMI two months ago. On admission the patient reported that she was going through one pad per hour and passing large clots. Patient also reported dyspnea on exertion as well as dizziness. Patient received a chest x-ray for shortness of breath, which showed clear lungs. Patient also received 2uPRBCs for her low hemoglobin. Patient was started on Aygestin 5mg for 10 days in ED. Patient was admitted to the gynecology service for further monitoring of her heavy uterine bleeding. Cardiology saw the patient, and they recommended continuing anticoagulation in the setting of recent drug eluting stent and recommended switching to Clopidogrel.     H/H: 6.6/24.3->2uPRBC->29.2  On HD#1, patient received 3.75mg IM Lupron and was subsequently discharged in stable condition. She was ambulating, tolerating po and voiding spontaneously. Instructed patient to continue Aygestin 5mg daily to complete 10-day course. Pt to have close f/u w/ Dr. Delarosa in clinic. Patient scheduled to receive MRI in January for preoperative planning for her heavy uterine bleeding.

## 2021-12-28 NOTE — ED ADULT NURSE REASSESSMENT NOTE - NS ED NURSE REASSESS COMMENT FT1
pharmacy contacted to send patients night time medications to [facility] tube station (#[id number])

## 2021-12-28 NOTE — PATIENT PROFILE ADULT - FALL HARM RISK - UNIVERSAL INTERVENTIONS
Bed in lowest position, wheels locked, appropriate side rails in place/Call bell, personal items and telephone in reach/Instruct patient to call for assistance before getting out of bed or chair/Non-slip footwear when patient is out of bed/[place] to call system/Physically safe environment - no spills, clutter or unnecessary equipment/Purposeful Proactive Rounding/Room/bathroom lighting operational, light cord in reach

## 2021-12-28 NOTE — PROGRESS NOTE ADULT - ASSESSMENT
A/P: 51y HD#2 admitted with heavy vaginal bleeding in the setting of anticoagulation for NSTEMI.  Patient is stable and doing well. Reports bleeding is improved this AM.     CV: Hemodynamically stable  Pulm: Saturating well on room air, encourage oob/amb. Shortness of breath improved s/p 2upRBC  GI: Continue regular diet  : Voiding spontaneously   Heme: c/w brilinta, aspirin. patient is s/p 2u pRBC, f/u AM CBC. c/w aygestin. monitor pad count  ID: Afebrile  Endo: T2DM, patient took home medication yesterday, consider ISS if she is not D/C home today  Dispo: Consider D/C home today    Erica Treviño, PGY-1

## 2021-12-28 NOTE — DISCHARGE NOTE NURSING/CASE MANAGEMENT/SOCIAL WORK - NSDCVIVACCINE_GEN_ALL_CORE_FT
Tdap; 11-Apr-2021 17:41; Supa Collier (RN); [employer]; [id number] (Exp. Date: 01-Oct-2022); IntraMuscular; Deltoid Left.; 0.5 milliLiter(s); VIS (VIS Published: 09-May-2013, VIS Presented: 11-Apr-2021);

## 2021-12-28 NOTE — DISCHARGE NOTE NURSING/CASE MANAGEMENT/SOCIAL WORK - NSDCPEFALRISK_GEN_ALL_CORE
For information on Fall & Injury Prevention, visit: [email or web address] OR  [email or web address] OR  https://www.cdc.gov/steadi/patient.html

## 2021-12-28 NOTE — DISCHARGE NOTE PROVIDER - NSDCCPCAREPLAN_GEN_ALL_CORE_FT
PRINCIPAL DISCHARGE DIAGNOSIS  Diagnosis: Dysfunctional uterine bleeding  Assessment and Plan of Treatment:       SECONDARY DISCHARGE DIAGNOSES  Diagnosis: Dysfunctional uterine bleeding  Assessment and Plan of Treatment:      PRINCIPAL DISCHARGE DIAGNOSIS  Diagnosis: Dysfunctional uterine bleeding  Assessment and Plan of Treatment: Regular diet as tolerated, regular activity as tolerated. Call your provider with further heavy vaginal bleeding and return to the hospital if saturating more than 1 pad/hr for over 2 hours or develop symptoms of anemia.   Please follow up with your Cardiologist within 1-2 weeks following discharge. Continue Brilinta until the day you  your Clopidogrel prescription. Take your last dose of Brilinta that night and then start Clopidogrel 300 loading dose the following morning to be continued with aspirin. The day after the 300 mg loading dose, continue with 75 mg Clopidogrel daily and ASA daily.   Please follow up with your Gynecologist within 1 week of discharge.         SECONDARY DISCHARGE DIAGNOSES  Diagnosis: Dysfunctional uterine bleeding  Assessment and Plan of Treatment:

## 2021-12-28 NOTE — PROGRESS NOTE ADULT - SUBJECTIVE AND OBJECTIVE BOX
HPI:  This is a 50yo F with history of T2DM on metformin, NSTEMI s/p [facility] 10/12/21 with MONI to mLAD, uterine fibroids with abnormal uterine bleeding who presented to ED today for vaginal bleeding. Had similar presentation in November requiring pRBC transfusion. Reports cyclic heavy bleeding with periods for the past 2 months. Most recent episode of bleeding started ~14 days ago. HD stable. Some lightheadedness but no syncope. Has otherwise been feeling well. Denies fevers, chills, chest pain, shortness of breath, abdominal pain, n/v, orthopnea, PND sxs, lower extremity edema. Currently HD stable. Hgb 6.6 from 9.1 11/25. Currently being transfused with 2u pRBCs.     Seen by Gynecology in ED. Recent endometrial biopsy was benign. Started on Norethindrone (progesterone) with plan for outpatient follow up by Gyn. Scheduled for MRI appointment in Jan for better evaluation of endometrium. Patient would eventually like hysterectomy to treat her heavy menses. No plan for interventions during this admission.     Cardiology consulted for DAPT recommendation and Hgb transfusion goal in setting of CAD. [facility] 10/12/21 with MONI to mLAD with Dr. Ugalde. Currently compliant on Aspirin 81mg and Brilinta 90mg BID.     ===========================  Interval Events  -   - Appropriate bump in h/h w 2u pRBCs  - No reported worsening CP/Palps/SOB\  ===========================        PMHx:   Diabetes  HTN (hypertension)  NSTEMI      PSHx:   S/P cholecystectomy      Allergies:  No Known Allergies      Home Meds: see MAR    Current Medications:   norethindrone acetate 5 milliGRAM(s) Oral daily      Social History:  Smoking History: denies  Alcohol Use: denies  Drug Use: denies    REVIEW OF SYSTEMS:  Constitutional:     [x ] negative [ ] fevers [ ] chills [ ] weight loss [ ] weight gain  HEENT:                  [x ] negative [ ] dry eyes [ ] eye irritation [ ] postnasal drip [ ] nasal congestion  CV:                         [ x] negative  [ ] chest pain [ ] orthopnea [ ] palpitations [ ] murmur  Resp:                     [x ] negative [ ] cough [ ] shortness of breath [ ] dyspnea [ ] wheezing [ ] sputum [ ]hemoptysis  GI:                          [ x] negative [ ] nausea [ ] vomiting [ ] diarrhea [ ] constipation [ ] abd pain [ ] dysphagia   :                        [ x] negative [ ] dysuria [ ] nocturia [ ] hematuria [ ] increased urinary frequency  Musculoskeletal: [x ] negative [ ] back pain [ ] myalgias [ ] arthralgias [ ] fracture  Skin:                       [ x] negative [ ] rash [ ] itch  Neurological:        [ x] negative [ ] headache [ ] dizziness [ ] syncope [ ] weakness [ ] numbness  Psychiatric:           [ x] negative [ ] anxiety [ ] depression  Endocrine:            [ x] negative [ ] diabetes [ ] thyroid problem  Heme/Lymph:      [ x] negative [ ] anemia [ ] bleeding problem  Allergic/Immune: [ x] negative [ ] itchy eyes [ ] nasal discharge [ ] hives [ ] angioedema    [ x] All other systems negative  [ ] Unable to assess ROS due to      Physical Exam:  T(F): 98.1 (12-27), Max: 98.3 (12-27)  HR: 83 (12-27) (77 - 92)  BP: 109/72 (12-27) (100/59 - 145/84)  RR: 15 (12-27)  SpO2: 98% (12-27)  GENERAL: No acute distress, well-developed  HEAD:  Atraumatic, Normocephalic  ENT: EOMI, conjunctiva and sclera clear, Neck supple, No JVD, moist mucosa  CHEST/LUNG: Clear to auscultation bilaterally; No wheeze, equal breath sounds bilaterally   HEART: Regular rate and rhythm; No murmurs, rubs, or gallops  ABDOMEN: Soft, Nontender, Nondistended; Bowel sounds present  EXTREMITIES:  No clubbing, cyanosis, or edema  PSYCH: Nl behavior, nl affect  NEUROLOGY: AAOx3, non-focal, cranial nerves intact    Cardiovascular Diagnostic Testing:    ECG: Personally reviewed:  Normal sinus rhythm, rate 91, non-specific T wave changes, no ST elevations or depressions     Echo: Personally reviewed:  TTE 10/12/21:  Conclusions:  1. Normal left ventricular internal dimensions and wall  thicknesses.  2. Mild segmental left ventricular systolic dysfunction.  Endocardial visualization enhanced with intravenous  injection of Ultrasonic Enhancing Agent (Definity). No left  ventricular thrombus.  3. Mild diastolic dysfunction (Stage I).  4. Normal right ventricular size and function.  *** No previous Echo exam.    Cath:  [facility] 10/12/21 with MONI to mLAD with Dr. Ugalde    Imaging:    CXR: Personally reviewed    Labs: Personally reviewed                        6.6    11.78 )-----------( 481      ( 27 Dec 2021 15:56 )             24.3     12-27    138  |  105  |  7   ----------------------------<  172<H>  4.3   |  21<L>  |  0.61    Ca    9.5      27 Dec 2021 15:56    TPro  7.4  /  Alb  4.3  /  TBili  0.2  /  DBili  x   /  AST  18  /  ALT  16  /  AlkPhos  94  12-27    PT/INR - ( 27 Dec 2021 15:56 )   PT: 12.8 sec;   INR: 1.07 ratio         PTT - ( 27 Dec 2021 15:56 )  PTT:27.3 sec            Assessment and Recommendation:   · Assessment	  This is a 50yo F with history of T2DM on metformin, NSTEMI s/p [facility] 10/12/21 with MONI to mLAD, uterine fibroids with abnormal uterine bleeding who presented to ED for vaginal bleeding secondary to heavy menses. HD stable. Hgb 6.6 from 9.1 11/25. Being transfused 2u pRBCs. Seen by Gynecology in ED. Recent endometrial biopsy was benign. Started on Norethindrone (progesterone) with plan for outpatient follow up by Gyn. Scheduled for MRI appointment in Jan for better evaluation of endometrium. Patient would eventually like hysterectomy to treat her heavy menses. No plan for interventions during this admission. Cardiology consulted for DAPT recommendation and Hgb transfusion goal in setting of CAD. [facility] 10/12/21 with MONI to mLAD with Dr. Ugalde. Currently compliant on Aspirin 81mg and Brilinta 90mg BID.     Plan:  - Discussed with Dr. Ugalde and Patient  Active bleeding after recent PCI LAD  She can be discharged home with the following instructions (does not need to be done as an inpatient):  She will continue brilinta until the day she picks up Clopidogrel (ideally today) - she will take her last dose of Brilinta that night and then start Clopidogrel 300 Loading dose the following morning to be continued with aspirin. The day after the 300 mg loading dose - she should continue with 75 mg clopidogrel daily and ASA daily.   - Will need outpatient Cardiology follow up at discharge (seen previously by Dr. Ugalde outpatient) within 1-2 wks                                                                        Forty-1 Minutes Spent in Patient Management          
R2 GYN Progress Note  HD#2    Patient seen and examined at bedside.  No acute events overnight. No acute complaints.  Patient states that her vaginal bleeding decreased overnight. She states that she has not had to change her pad since coming up to the floor. She states that since receiving the two units of blood her shortness of breath has improved.  Patient is ambulating and tolerating regular diet. Patient is passing flatus.    Patient is voiding spontaneously.  Denies CP, N/V, fevers, and chills.    Vital Signs Last 24 Hours  T(C): 36.7 (12-28-21 @ 04:12), Max: 36.8 (12-27-21 @ 15:23)  HR: 92 (12-28-21 @ 04:12) (77 - 92)  BP: 116/72 (12-28-21 @ 04:12) (100/59 - 145/84)  RR: 16 (12-28-21 @ 04:12) (15 - 22)  SpO2: 100% (12-28-21 @ 04:12) (96% - 100%)    I&O's Summary      Physical Exam:  General: NAD  CV: RR, S1, S2, no M/R/G  Lungs: CTA b/l, good air flow b/l   Abdomen: Soft, appropriately-tender, softly distended, tympanic, normoactive bowel sounds  : minimal bleeding on pad (pad ~25% saturated)  Ext: No pain or swelling     Labs:                        6.6    11.78 )-----------( 481      ( 27 Dec 2021 15:56 )             24.3   baso 2.7    eos 0.0    imm gran x      lymph 12.6   mono 3.6    poly 81.1       MEDICATIONS  (STANDING):  aspirin  chewable 81 milliGRAM(s) Oral daily  atorvastatin 80 milliGRAM(s) Oral at bedtime  dextrose 40% Gel 15 Gram(s) Oral once  dextrose 5%. 1000 milliLiter(s) (50 mL/Hr) IV Continuous <Continuous>  dextrose 5%. 1000 milliLiter(s) (100 mL/Hr) IV Continuous <Continuous>  dextrose 50% Injectable 25 Gram(s) IV Push once  dextrose 50% Injectable 12.5 Gram(s) IV Push once  dextrose 50% Injectable 25 Gram(s) IV Push once  glucagon  Injectable 1 milliGRAM(s) IntraMuscular once  insulin glargine Injectable (LANTUS) 30 Unit(s) SubCutaneous at bedtime  metFORMIN 1000 milliGRAM(s) Oral two times a day  metoprolol succinate ER 25 milliGRAM(s) Oral daily  norethindrone acetate 5 milliGRAM(s) Oral daily  ticagrelor 90 milliGRAM(s) Oral every 12 hours    MEDICATIONS  (PRN):

## 2021-12-28 NOTE — PROGRESS NOTE ADULT - REASON FOR ADMISSION
heavy vaginal bleeding in the setting of anticoagulation
heavy vaginal bleeding in the setting of anticoagulation

## 2021-12-28 NOTE — PROGRESS NOTE ADULT - ATTENDING COMMENTS
Agree with note above. patient seen. Doing well this AM. H/H improved hg now >9. denies CP, SOB, abd pain. vaginal bleeding greatly improved now scant bleeding on pad since 2 hrs ago. overnight with improvement in bleeding as well. Discussed hormonal options for progestin only treatment of heavy vaginal bleeding. discussed possible treatment with lupron as well for menses suppression until AC stopped or until pre surgical work up done. Discussed lupron is used to induce menopause, thus may have menopausal symptoms like hot flashes, sleep disturbances, mood lability. Also discussed lupron may also cause a shrinkage of fibroids, thus would also improve bleeding. discussed can take both norethindrone short term and lupron for short and long term treatment of vaginal bleeding. Also discussed bone and cardiac health with menopausal suppression. Patient agreeable for lupron. will obtain inpatient and will f/u outpatient as well.

## 2021-12-28 NOTE — DISCHARGE NOTE NURSING/CASE MANAGEMENT/SOCIAL WORK - PATIENT PORTAL LINK FT
You can access the FollowMyHealth Patient Portal offered by [facility] by registering at the following website: [email or web address]. By joining [employer]’s FollowMyHealth portal, you will also be able to view your health information using other applications (apps) compatible with our system.

## 2021-12-28 NOTE — DISCHARGE NOTE PROVIDER - PROVIDER TOKENS
PROVIDER:[TOKEN:[[id number]],FOLLOWUP:[Routine]] PROVIDER:[TOKEN:[[id number]],FOLLOWUP:[Routine]],PROVIDER:[TOKEN:[[id number]]]

## 2022-01-04 ENCOUNTER — APPOINTMENT (OUTPATIENT)
Dept: MRI IMAGING | Facility: CLINIC | Age: 52
End: 2022-01-04
Payer: COMMERCIAL

## 2022-01-04 ENCOUNTER — OUTPATIENT (OUTPATIENT)
Dept: OUTPATIENT SERVICES | Facility: HOSPITAL | Age: 52
LOS: 1 days | End: 2022-01-04
Payer: COMMERCIAL

## 2022-01-04 DIAGNOSIS — D25.9 LEIOMYOMA OF UTERUS, UNSPECIFIED: ICD-10-CM

## 2022-01-04 DIAGNOSIS — Z00.8 ENCOUNTER FOR OTHER GENERAL EXAMINATION: ICD-10-CM

## 2022-01-04 DIAGNOSIS — Z90.49 ACQUIRED ABSENCE OF OTHER SPECIFIED PARTS OF DIGESTIVE TRACT: Chronic | ICD-10-CM

## 2022-01-04 PROCEDURE — A9585: CPT

## 2022-01-04 PROCEDURE — 72197 MRI PELVIS W/O & W/DYE: CPT

## 2022-01-04 PROCEDURE — 72197 MRI PELVIS W/O & W/DYE: CPT | Mod: 26

## 2022-01-13 ENCOUNTER — APPOINTMENT (OUTPATIENT)
Dept: CARDIOLOGY | Facility: CLINIC | Age: 52
End: 2022-01-13
Payer: COMMERCIAL

## 2022-01-13 VITALS
HEIGHT: 66 IN | DIASTOLIC BLOOD PRESSURE: 79 MMHG | SYSTOLIC BLOOD PRESSURE: 120 MMHG | HEART RATE: 84 BPM | WEIGHT: 203 LBS | OXYGEN SATURATION: 99 % | BODY MASS INDEX: 32.62 KG/M2

## 2022-01-13 PROBLEM — I10 ESSENTIAL (PRIMARY) HYPERTENSION: Chronic | Status: ACTIVE | Noted: 2021-12-27

## 2022-01-13 PROCEDURE — 93000 ELECTROCARDIOGRAM COMPLETE: CPT

## 2022-01-13 PROCEDURE — 99214 OFFICE O/P EST MOD 30 MIN: CPT

## 2022-01-13 RX ORDER — CHLORHEXIDINE GLUCONATE 4 %
325 (65 FE) LIQUID (ML) TOPICAL TWICE DAILY
Refills: 0 | Status: ACTIVE | COMMUNITY
Start: 2022-01-13

## 2022-01-13 RX ORDER — TICAGRELOR 90 MG/1
90 TABLET ORAL
Qty: 90 | Refills: 1 | Status: DISCONTINUED | COMMUNITY
Start: 2021-10-28 | End: 2022-01-13

## 2022-01-14 ENCOUNTER — LABORATORY RESULT (OUTPATIENT)
Age: 52
End: 2022-01-14

## 2022-01-18 LAB
ALBUMIN SERPL ELPH-MCNC: 4.5 G/DL
ALP BLD-CCNC: 86 U/L
ALT SERPL-CCNC: 16 U/L
ANION GAP SERPL CALC-SCNC: 13 MMOL/L
AST SERPL-CCNC: 20 U/L
BASOPHILS # BLD AUTO: 0.06 K/UL
BASOPHILS NFR BLD AUTO: 0.8 %
BILIRUB SERPL-MCNC: 0.2 MG/DL
BUN SERPL-MCNC: 12 MG/DL
CALCIUM SERPL-MCNC: 9.4 MG/DL
CHLORIDE SERPL-SCNC: 108 MMOL/L
CHOLEST SERPL-MCNC: 88 MG/DL
CO2 SERPL-SCNC: 21 MMOL/L
CREAT SERPL-MCNC: 0.74 MG/DL
EOSINOPHIL # BLD AUTO: 0.22 K/UL
EOSINOPHIL NFR BLD AUTO: 3 %
GLUCOSE SERPL-MCNC: 153 MG/DL
HCT VFR BLD CALC: 34.8 %
HDLC SERPL-MCNC: 29 MG/DL
HGB BLD-MCNC: 9.3 G/DL
IMM GRANULOCYTES NFR BLD AUTO: 0.3 %
LDLC SERPL CALC-MCNC: 38 MG/DL
LYMPHOCYTES # BLD AUTO: 1.31 K/UL
LYMPHOCYTES NFR BLD AUTO: 17.6 %
MAGNESIUM SERPL-MCNC: 2 MG/DL
MAN DIFF?: NORMAL
MCHC RBC-ENTMCNC: 21 PG
MCHC RBC-ENTMCNC: 26.7 GM/DL
MCV RBC AUTO: 78.7 FL
MONOCYTES # BLD AUTO: 0.57 K/UL
MONOCYTES NFR BLD AUTO: 7.7 %
NEUTROPHILS # BLD AUTO: 5.27 K/UL
NEUTROPHILS NFR BLD AUTO: 70.6 %
NONHDLC SERPL-MCNC: 60 MG/DL
PLATELET # BLD AUTO: 280 K/UL
POTASSIUM SERPL-SCNC: 4.4 MMOL/L
PROT SERPL-MCNC: 7.3 G/DL
RBC # BLD: 4.42 M/UL
RBC # FLD: 18.3 %
SODIUM SERPL-SCNC: 142 MMOL/L
TRIGL SERPL-MCNC: 107 MG/DL
TSH SERPL-ACNC: 3.01 UIU/ML
WBC # FLD AUTO: 7.45 K/UL

## 2022-01-21 NOTE — ED ADULT NURSE NOTE - CADM POA CENTRAL LINE
No
PAST MEDICAL HISTORY:  Carpal tunnel syndrome     DM (diabetes mellitus)     HTN (hypertension)

## 2022-01-22 NOTE — PHYSICAL EXAM
[Well Developed] : well developed [Well Nourished] : well nourished [No Acute Distress] : no acute distress [Normal Conjunctiva] : normal conjunctiva [Normal Venous Pressure] : normal venous pressure [No Carotid Bruit] : no carotid bruit [Normal S1, S2] : normal S1, S2 [No Murmur] : no murmur [No Rub] : no rub [No Gallop] : no gallop [Clear Lung Fields] : clear lung fields [Good Air Entry] : good air entry [No Respiratory Distress] : no respiratory distress  [Soft] : abdomen soft [Non Tender] : non-tender [No Masses/organomegaly] : no masses/organomegaly [Normal Bowel Sounds] : normal bowel sounds [Normal Gait] : normal gait [No Edema] : no edema [No Cyanosis] : no cyanosis [No Clubbing] : no clubbing [No Varicosities] : no varicosities [No Rash] : no rash [No Skin Lesions] : no skin lesions [Moves all extremities] : moves all extremities [No Focal Deficits] : no focal deficits [Normal Speech] : normal speech [Alert and Oriented] : alert and oriented [Normal memory] : normal memory [de-identified] : Right radial - clean/dry/intact with no thrill

## 2022-01-22 NOTE — REASON FOR VISIT
[Structural Heart and Valve Disease] : structural heart and valve disease [Hyperlipidemia] : hyperlipidemia [Hypertension] : hypertension [FreeTextEntry1] : 51-year-old female with a past medical history significant for\par \par Coronary artery disease s/p NSTEMI/PCI (mLAD) - 10/11/2021\par Type 2 diabetes mellitus (hemoglobin A1c 11.4%, Metformin)\par Varicose veins of bilateral lower extremities\par Cholecystectomy\par Stab phlebectomy\par Tubal ligation bilaterally\par Pre-menopause\par \par Since she was in the hospital she is doing better.  Has had initially a few episodes when she felt that something was sticking her that occurred a few days after the procedure.  When she ambulates no chest pain/tightness/discomfort, light-headed sensation, dizziness or palpations.  No blood in her stool or urine.  No fevers, chills or sweats.  Prior to the episodes she had severe anxiety with chest pain that has now resolved when she gets anxious.  Ms. Phoenix ambulates 2.5 miles in the morning.  Notes significant changes in her diet.  Has a decrease in appetite and has cut portion sizes.  Eating more vegetables.  Cutting out fried foods.   \par ================\par 1/13/2022\par Twice the patient was in the ER for severe blood loss for which she was anemic.  She was last in the ER the day after [place].  The patient has large fibroids.  She is getting injections by her gynecologist.  The patient was transitioned from Brillinta to clopidogrel 75mg daily.  Ms. Phoenix had her blood work checked on 12/27/2021.\par \par Gynecologist - Dr. 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  She presented on October 11, 2021 with a NSTEMI.  She underwent cardiac catheterization and found to have severe obstructive disease of the left anterior descending artery.  Middle left anterior descending artery drug-eluting stent was inserted.  The patient tolerated the procedure well.  No discomfort in her wrist.\par \par Overall she is now doing well with no cardiopulmonary complaints at rest or upon exertion.  Taking all her medications as prescribed.\par \par --Has been hospitalized with severe vaginal bleeding from anemia (2 hospitalizations - 2 days).  Each hospitalization she has been transfused 2U of PRBC.  She was treated with Aygestin 5mg x10 days \par --Repeat TTE in 3 months to assess EF/WMA.  Indications for study was reviewed with the patient.\par --If clinically doing well and EKG/TTE is stable in nature at 6 months anniversary of stent will consider discontinuation of clopidogrel.  Continue aspirin 81mg daily with aspirin.  Avoid all NSAIDs.  May take Tylenol for discomfort.\par --Reviewed laboratory work from 11/1/2021 with the patient.\par --She underwent MRI on 1/4/2022 for better evaluation of endometrium per last US read showing bulky uterus with asymmetric thickening of endometrium infiltrating into anterior myometrium and to rule out neoplasm.  Results are pending.\par --Status post drug-eluting stent/Cy on October 12, 2021.  Radial approach.  LVEDP 12 mmHg.  No aortic valve stenosis.  Normal left main with right dominant system.\par --Continue aspirin 81 mg daily.\par --Continue clopidogrel 75mg daily.  Signs and symptoms patient were reviewed.  Avoid all NSAIDs.  May take Tylenol for discomfort.  \par --Labs have been ordered to see if CBC is stable.\par --Continue metoprolol succinate 25 mg daily.\par --Recommend a repeat TTE in 6 months to 12 months.  Indications and details of study was gone over.\par --EKG done do to history of coronary artery disease/hypertension/dyslipidemia.\par --Continue atorvastatin 80 mg daily.\par --Continue Metformin and Ozempic.\par --The patient is morbidity obese (BMI).  Discussed strategies to lose weight and short and long term complications.\par --EKG performed do to history of coronary artery disease, hyperlipidemia and hypertension.\par --It was reviewed with the patient the importance of following a heart healthy lifestyle. AHA/ACC guidelines stress the importance of lifestyle modifications to lower cardiovascular disease risk. This includes eating a heart-healthy diet (fresh fruits/vegetable, whole grains, limit intake of sweets, sugar-sweetened beverages, red meats). He was encouraged to perform regular aerobic exercises at least 30 minutes of moderate intensity exercise at least 4 times a week and to maintain a desirable body weight and avoid tobacco products or second hand exposure.\par \par All questions and concerns of the patient were addressed.  Follow-up 3 months at which time repeat CBC/CMP along with TTE will be performed (if none done sooner).

## 2022-01-28 ENCOUNTER — LABORATORY RESULT (OUTPATIENT)
Age: 52
End: 2022-01-28

## 2022-01-31 LAB
24R-OH-CALCIDIOL SERPL-MCNC: 60.1 PG/ML
BASOPHILS # BLD AUTO: 0.07 K/UL
BASOPHILS NFR BLD AUTO: 0.8 %
EOSINOPHIL # BLD AUTO: 0.1 K/UL
EOSINOPHIL NFR BLD AUTO: 1.1 %
ESTIMATED AVERAGE GLUCOSE: 143 MG/DL
HBA1C MFR BLD HPLC: 6.6 %
HCT VFR BLD CALC: 33.8 %
HGB BLD-MCNC: 9.1 G/DL
IMM GRANULOCYTES NFR BLD AUTO: 0.2 %
IRON SATN MFR SERPL: 4 %
IRON SERPL-MCNC: 16 UG/DL
LYMPHOCYTES # BLD AUTO: 1.45 K/UL
LYMPHOCYTES NFR BLD AUTO: 16.5 %
MAN DIFF?: NORMAL
MCHC RBC-ENTMCNC: 20.1 PG
MCHC RBC-ENTMCNC: 26.9 GM/DL
MCV RBC AUTO: 74.6 FL
MONOCYTES # BLD AUTO: 0.77 K/UL
MONOCYTES NFR BLD AUTO: 8.8 %
NEUTROPHILS # BLD AUTO: 6.36 K/UL
NEUTROPHILS NFR BLD AUTO: 72.6 %
PLATELET # BLD AUTO: 429 K/UL
RBC # BLD: 4.53 M/UL
RBC # FLD: 18.7 %
TIBC SERPL-MCNC: 391 UG/DL
UIBC SERPL-MCNC: 375 UG/DL
WBC # FLD AUTO: 8.77 K/UL

## 2022-02-03 ENCOUNTER — NON-APPOINTMENT (OUTPATIENT)
Age: 52
End: 2022-02-03

## 2022-02-03 ENCOUNTER — RESULT REVIEW (OUTPATIENT)
Age: 52
End: 2022-02-03

## 2022-02-15 ENCOUNTER — APPOINTMENT (OUTPATIENT)
Dept: INTERVENTIONAL RADIOLOGY/VASCULAR | Facility: CLINIC | Age: 52
End: 2022-02-15
Payer: COMMERCIAL

## 2022-02-15 ENCOUNTER — NON-APPOINTMENT (OUTPATIENT)
Age: 52
End: 2022-02-15

## 2022-02-15 DIAGNOSIS — Z78.9 OTHER SPECIFIED HEALTH STATUS: ICD-10-CM

## 2022-02-15 DIAGNOSIS — Z92.29 PERSONAL HISTORY OF OTHER DRUG THERAPY: ICD-10-CM

## 2022-02-15 PROCEDURE — 99214 OFFICE O/P EST MOD 30 MIN: CPT | Mod: 95

## 2022-02-15 RX ORDER — BLOOD-GLUCOSE SENSOR
EACH MISCELLANEOUS
Qty: 3 | Refills: 0 | Status: DISCONTINUED | COMMUNITY
Start: 2021-10-19 | End: 2022-02-15

## 2022-02-15 RX ORDER — UBIDECARENONE/VIT E ACET 100MG-5
CAPSULE ORAL
Refills: 0 | Status: ACTIVE | COMMUNITY

## 2022-02-15 RX ORDER — BLOOD-GLUCOSE,RECEIVER,CONT
EACH MISCELLANEOUS
Qty: 1 | Refills: 0 | Status: DISCONTINUED | COMMUNITY
Start: 2021-10-19 | End: 2022-02-15

## 2022-02-15 RX ORDER — BLOOD-GLUCOSE TRANSMITTER
EACH MISCELLANEOUS
Qty: 1 | Refills: 2 | Status: DISCONTINUED | COMMUNITY
Start: 2021-10-19 | End: 2022-02-15

## 2022-02-15 RX ORDER — ADHESIVE TAPE 3"X 2.3 YD
50 MCG TAPE, NON-MEDICATED TOPICAL
Refills: 0 | Status: ACTIVE | COMMUNITY

## 2022-02-15 RX ORDER — VITAMIN B COMPLEX
CAPSULE ORAL
Refills: 0 | Status: ACTIVE | COMMUNITY

## 2022-02-24 ENCOUNTER — NON-APPOINTMENT (OUTPATIENT)
Age: 52
End: 2022-02-24

## 2022-02-24 ENCOUNTER — APPOINTMENT (OUTPATIENT)
Dept: CARDIOLOGY | Facility: CLINIC | Age: 52
End: 2022-02-24
Payer: COMMERCIAL

## 2022-02-24 VITALS
HEART RATE: 88 BPM | OXYGEN SATURATION: 100 % | DIASTOLIC BLOOD PRESSURE: 82 MMHG | HEIGHT: 66 IN | SYSTOLIC BLOOD PRESSURE: 128 MMHG

## 2022-02-24 PROCEDURE — 99214 OFFICE O/P EST MOD 30 MIN: CPT

## 2022-02-24 PROCEDURE — 93000 ELECTROCARDIOGRAM COMPLETE: CPT

## 2022-03-01 NOTE — PHYSICAL EXAM
[Well Developed] : well developed [Well Nourished] : well nourished [No Acute Distress] : no acute distress [Normal Conjunctiva] : normal conjunctiva [Normal Venous Pressure] : normal venous pressure [No Carotid Bruit] : no carotid bruit [Normal S1, S2] : normal S1, S2 [No Murmur] : no murmur [No Rub] : no rub [No Gallop] : no gallop [Clear Lung Fields] : clear lung fields [Good Air Entry] : good air entry [No Respiratory Distress] : no respiratory distress  [Soft] : abdomen soft [Non Tender] : non-tender [No Masses/organomegaly] : no masses/organomegaly [Normal Bowel Sounds] : normal bowel sounds [Normal Gait] : normal gait [No Edema] : no edema [No Cyanosis] : no cyanosis [No Clubbing] : no clubbing [No Varicosities] : no varicosities [No Rash] : no rash [No Skin Lesions] : no skin lesions [Moves all extremities] : moves all extremities [No Focal Deficits] : no focal deficits [Normal Speech] : normal speech [Alert and Oriented] : alert and oriented [Normal memory] : normal memory [de-identified] : Right radial - clean/dry/intact with no thrill

## 2022-03-09 RX ORDER — INSULIN GLARGINE 100 [IU]/ML
100 INJECTION, SOLUTION SUBCUTANEOUS AT BEDTIME
Qty: 3 | Refills: 1 | Status: DISCONTINUED | COMMUNITY
Start: 2021-10-19 | End: 2022-03-09

## 2022-03-09 RX ORDER — METFORMIN HYDROCHLORIDE 1000 MG/1
1000 TABLET, FILM COATED, EXTENDED RELEASE ORAL
Qty: 180 | Refills: 3 | Status: ACTIVE | COMMUNITY
Start: 2021-08-16 | End: 1900-01-01

## 2022-03-14 ENCOUNTER — OUTPATIENT (OUTPATIENT)
Dept: OUTPATIENT SERVICES | Facility: HOSPITAL | Age: 52
LOS: 1 days | End: 2022-03-14
Payer: COMMERCIAL

## 2022-03-14 VITALS
SYSTOLIC BLOOD PRESSURE: 127 MMHG | TEMPERATURE: 98 F | WEIGHT: 201.94 LBS | HEART RATE: 88 BPM | RESPIRATION RATE: 16 BRPM | HEIGHT: 66 IN | DIASTOLIC BLOOD PRESSURE: 83 MMHG | OXYGEN SATURATION: 100 %

## 2022-03-14 DIAGNOSIS — E11.65 TYPE 2 DIABETES MELLITUS WITH HYPERGLYCEMIA: ICD-10-CM

## 2022-03-14 DIAGNOSIS — Z95.5 PRESENCE OF CORONARY ANGIOPLASTY IMPLANT AND GRAFT: ICD-10-CM

## 2022-03-14 DIAGNOSIS — Z90.49 ACQUIRED ABSENCE OF OTHER SPECIFIED PARTS OF DIGESTIVE TRACT: Chronic | ICD-10-CM

## 2022-03-14 DIAGNOSIS — Z98.51 TUBAL LIGATION STATUS: Chronic | ICD-10-CM

## 2022-03-14 DIAGNOSIS — Z01.818 ENCOUNTER FOR OTHER PREPROCEDURAL EXAMINATION: ICD-10-CM

## 2022-03-14 DIAGNOSIS — D25.9 LEIOMYOMA OF UTERUS, UNSPECIFIED: ICD-10-CM

## 2022-03-14 DIAGNOSIS — Z11.52 ENCOUNTER FOR SCREENING FOR COVID-19: ICD-10-CM

## 2022-03-14 DIAGNOSIS — Z95.5 PRESENCE OF CORONARY ANGIOPLASTY IMPLANT AND GRAFT: Chronic | ICD-10-CM

## 2022-03-14 LAB
A1C WITH ESTIMATED AVERAGE GLUCOSE RESULT: 8.5 % — HIGH (ref 4–5.6)
ANION GAP SERPL CALC-SCNC: 14 MMOL/L — SIGNIFICANT CHANGE UP (ref 5–17)
BUN SERPL-MCNC: 12 MG/DL — SIGNIFICANT CHANGE UP (ref 7–23)
CALCIUM SERPL-MCNC: 9.9 MG/DL — SIGNIFICANT CHANGE UP (ref 8.4–10.5)
CHLORIDE SERPL-SCNC: 105 MMOL/L — SIGNIFICANT CHANGE UP (ref 96–108)
CO2 SERPL-SCNC: 22 MMOL/L — SIGNIFICANT CHANGE UP (ref 22–31)
CREAT SERPL-MCNC: 0.62 MG/DL — SIGNIFICANT CHANGE UP (ref 0.5–1.3)
EGFR: 108 ML/MIN/1.73M2 — SIGNIFICANT CHANGE UP
ESTIMATED AVERAGE GLUCOSE: 197 MG/DL — HIGH (ref 68–114)
GLUCOSE SERPL-MCNC: 141 MG/DL — HIGH (ref 70–99)
HCT VFR BLD CALC: 41.2 % — SIGNIFICANT CHANGE UP (ref 34.5–45)
HGB BLD-MCNC: 11.1 G/DL — LOW (ref 11.5–15.5)
MCHC RBC-ENTMCNC: 19 PG — LOW (ref 27–34)
MCHC RBC-ENTMCNC: 26.9 GM/DL — LOW (ref 32–36)
MCV RBC AUTO: 70.4 FL — LOW (ref 80–100)
NRBC # BLD: 0 /100 WBCS — SIGNIFICANT CHANGE UP (ref 0–0)
PLATELET # BLD AUTO: 344 K/UL — SIGNIFICANT CHANGE UP (ref 150–400)
POTASSIUM SERPL-MCNC: 4.5 MMOL/L — SIGNIFICANT CHANGE UP (ref 3.5–5.3)
POTASSIUM SERPL-SCNC: 4.5 MMOL/L — SIGNIFICANT CHANGE UP (ref 3.5–5.3)
RBC # BLD: 5.85 M/UL — HIGH (ref 3.8–5.2)
RBC # FLD: 21.5 % — HIGH (ref 10.3–14.5)
SARS-COV-2 RNA SPEC QL NAA+PROBE: SIGNIFICANT CHANGE UP
SODIUM SERPL-SCNC: 141 MMOL/L — SIGNIFICANT CHANGE UP (ref 135–145)
WBC # BLD: 9.87 K/UL — SIGNIFICANT CHANGE UP (ref 3.8–10.5)
WBC # FLD AUTO: 9.87 K/UL — SIGNIFICANT CHANGE UP (ref 3.8–10.5)

## 2022-03-14 PROCEDURE — C9803: CPT

## 2022-03-14 PROCEDURE — 86901 BLOOD TYPING SEROLOGIC RH(D): CPT

## 2022-03-14 PROCEDURE — U0005: CPT

## 2022-03-14 PROCEDURE — G0463: CPT

## 2022-03-14 PROCEDURE — U0003: CPT

## 2022-03-14 PROCEDURE — 86850 RBC ANTIBODY SCREEN: CPT

## 2022-03-14 PROCEDURE — 86900 BLOOD TYPING SEROLOGIC ABO: CPT

## 2022-03-14 NOTE — H&P PST ADULT - FALL HARM RISK - UNIVERSAL INTERVENTIONS
ASP Bed in lowest position, wheels locked, appropriate side rails in place/Call bell, personal items and telephone in reach/Instruct patient to call for assistance before getting out of bed or chair/Non-slip footwear when patient is out of bed/[place] to call system/Physically safe environment - no spills, clutter or unnecessary equipment/Purposeful Proactive Rounding/Room/bathroom lighting operational, light cord in reach

## 2022-03-14 NOTE — H&P PST ADULT - NSWEIGHTCALCTOOLDRUG_GEN_A_CORE
used Surgeon/Pathologist Verbiage (Will Incorporate Name Of Surgeon From Intro If Not Blank): operated in two distinct and integrated capacities as the surgeon and pathologist.

## 2022-03-14 NOTE — H&P PST ADULT - PROBLEM SELECTOR PLAN 3
HgA1C. Last dose of Jardiance 03/14/22, last dose of Metformin 03/16/22 a.m. dose, Insulin 24 units beckie before procedure. FS on admission.

## 2022-03-14 NOTE — H&P PST ADULT - HISTORY OF PRESENT ILLNESS
Patient is a 50 yo F with h/o HTN, obesity, DM2, uterine fibroids complicated by severe anemia due to severe vaginal bleeding since being started on Brilinta s/p mLAD MONI (10/12/2021) for NSTEMI. Pt required blood transfusions on 11/24/21 and 12/27/21.  She was switched from Brilinta to Plavix and was started on Norethindrone. She states she had no bleeding since last blood transfusion till 03/04/22 when mild vaginal bleeding started again. She is scheduled for Uterine Artery Embolization on 03/17/22.     Covid test done today, 03/14/22.  Patient is a 52 yo F with h/o HTN, obesity, DM2, uterine fibroids complicated by severe anemia due to severe vaginal bleeding since being started on Brilinta s/p mLAD MONI (10/12/2021) for NSTEMI. Pt required blood transfusions on 11/24/21 and 12/27/21.  She was switched from Brilinta to Plavix and was started on Norethindrone. She states she had no bleeding since last blood transfusion till 03/04/22 when mild vaginal bleeding started again. She is scheduled for Uterine Artery Embolization on 03/17/22.     Covid test done today, 03/14/22.     Case discussed with Dr. PEDRO Pierson (anesthesia) - less than 6 mo after MONI - OK to proceed.

## 2022-03-14 NOTE — H&P PST ADULT - NSICDXPASTSURGICALHX_GEN_ALL_CORE_FT
PAST SURGICAL HISTORY:  H/O tubal ligation 2009    S/P cholecystectomy 2009    Stented coronary artery DESx1 to LAD, 10/12/2021

## 2022-03-14 NOTE — H&P PST ADULT - TEMPERATURE IN CELSIUS (DEGREES C)
36.9 - Will hold anti-hypertensives in setting of orthostatic hypotension  - However, will c/w metoprolol in the setting of significant CAD

## 2022-03-14 NOTE — H&P PST ADULT - NSICDXPASTMEDICALHX_GEN_ALL_CORE_FT
PAST MEDICAL HISTORY:  Acute myocardial infarction 10/11/2021    Anemia due to heavy vaginal bleeding in the setting of anticoagulation s/p DESx1 on 10/12/22, Pt. received blood transfusions on 11/24/21 and 12/27/21    CAD (coronary artery disease)     Diabetes Type 2    History of UTI 12/2021    HTN (hypertension)     Leiomyoma of uterus     Obesity, Class I, BMI 30-34.9     Varicose veins of both lower extremities

## 2022-03-15 RX ORDER — INSULIN GLARGINE 100 [IU]/ML
100 INJECTION, SOLUTION SUBCUTANEOUS
Qty: 3 | Refills: 1 | Status: ACTIVE | COMMUNITY
Start: 2022-03-09 | End: 1900-01-01

## 2022-03-17 ENCOUNTER — INPATIENT (INPATIENT)
Facility: HOSPITAL | Age: 52
LOS: 0 days | Discharge: HOME | DRG: 750 | End: 2022-03-18
Attending: RADIOLOGY | Admitting: RADIOLOGY
Payer: COMMERCIAL

## 2022-03-17 VITALS
OXYGEN SATURATION: 100 % | SYSTOLIC BLOOD PRESSURE: 139 MMHG | WEIGHT: 205.03 LBS | DIASTOLIC BLOOD PRESSURE: 83 MMHG | TEMPERATURE: 98 F | RESPIRATION RATE: 18 BRPM | HEIGHT: 66 IN | HEART RATE: 88 BPM

## 2022-03-17 DIAGNOSIS — E11.9 TYPE 2 DIABETES MELLITUS WITHOUT COMPLICATIONS: ICD-10-CM

## 2022-03-17 DIAGNOSIS — Z95.5 PRESENCE OF CORONARY ANGIOPLASTY IMPLANT AND GRAFT: Chronic | ICD-10-CM

## 2022-03-17 DIAGNOSIS — I25.10 ATHEROSCLEROTIC HEART DISEASE OF NATIVE CORONARY ARTERY WITHOUT ANGINA PECTORIS: ICD-10-CM

## 2022-03-17 DIAGNOSIS — D25.9 LEIOMYOMA OF UTERUS, UNSPECIFIED: ICD-10-CM

## 2022-03-17 DIAGNOSIS — Z98.890 OTHER SPECIFIED POSTPROCEDURAL STATES: ICD-10-CM

## 2022-03-17 DIAGNOSIS — Z98.51 TUBAL LIGATION STATUS: Chronic | ICD-10-CM

## 2022-03-17 DIAGNOSIS — Z90.49 ACQUIRED ABSENCE OF OTHER SPECIFIED PARTS OF DIGESTIVE TRACT: Chronic | ICD-10-CM

## 2022-03-17 LAB
GLUCOSE BLDC GLUCOMTR-MCNC: 127 MG/DL — HIGH (ref 70–99)
GLUCOSE BLDC GLUCOMTR-MCNC: 129 MG/DL — HIGH (ref 70–99)
GLUCOSE BLDC GLUCOMTR-MCNC: 156 MG/DL — HIGH (ref 70–99)

## 2022-03-17 PROCEDURE — 76937 US GUIDE VASCULAR ACCESS: CPT | Mod: 26

## 2022-03-17 PROCEDURE — 37243 VASC EMBOLIZE/OCCLUDE ORGAN: CPT

## 2022-03-17 PROCEDURE — 36247 INS CATH ABD/L-EXT ART 3RD: CPT

## 2022-03-17 PROCEDURE — 99223 1ST HOSP IP/OBS HIGH 75: CPT

## 2022-03-17 RX ORDER — DEXTROSE 50 % IN WATER 50 %
15 SYRINGE (ML) INTRAVENOUS ONCE
Refills: 0 | Status: ACTIVE | OUTPATIENT
Start: 2022-03-17 | End: 2023-02-13

## 2022-03-17 RX ORDER — MORPHINE SULFATE 50 MG/1
30 CAPSULE, EXTENDED RELEASE ORAL
Refills: 0 | Status: DISCONTINUED | OUTPATIENT
Start: 2022-03-17 | End: 2022-03-18

## 2022-03-17 RX ORDER — SODIUM CHLORIDE 9 MG/ML
1000 INJECTION, SOLUTION INTRAVENOUS
Refills: 0 | Status: ACTIVE | OUTPATIENT
Start: 2022-03-17 | End: 2023-02-13

## 2022-03-17 RX ORDER — CLOPIDOGREL BISULFATE 75 MG/1
75 TABLET, FILM COATED ORAL DAILY
Refills: 0 | Status: ACTIVE | OUTPATIENT
Start: 2022-03-17 | End: 2023-02-13

## 2022-03-17 RX ORDER — INSULIN GLARGINE 100 [IU]/ML
15 INJECTION, SOLUTION SUBCUTANEOUS AT BEDTIME
Refills: 0 | Status: ACTIVE | OUTPATIENT
Start: 2022-03-17 | End: 2023-02-13

## 2022-03-17 RX ORDER — METOPROLOL TARTRATE 50 MG
25 TABLET ORAL DAILY
Refills: 0 | Status: ACTIVE | OUTPATIENT
Start: 2022-03-17 | End: 2023-02-13

## 2022-03-17 RX ORDER — ATORVASTATIN CALCIUM 80 MG/1
80 TABLET, FILM COATED ORAL AT BEDTIME
Refills: 0 | Status: ACTIVE | OUTPATIENT
Start: 2022-03-17 | End: 2023-02-13

## 2022-03-17 RX ORDER — ONDANSETRON 8 MG/1
4 TABLET, FILM COATED ORAL EVERY 6 HOURS
Refills: 0 | Status: DISCONTINUED | OUTPATIENT
Start: 2022-03-17 | End: 2022-03-18

## 2022-03-17 RX ORDER — GLUCAGON INJECTION, SOLUTION 0.5 MG/.1ML
1 INJECTION, SOLUTION SUBCUTANEOUS ONCE
Refills: 0 | Status: ACTIVE | OUTPATIENT
Start: 2022-03-17 | End: 2023-02-13

## 2022-03-17 RX ORDER — ACETAMINOPHEN 500 MG
650 TABLET ORAL EVERY 6 HOURS
Refills: 0 | Status: ACTIVE | OUTPATIENT
Start: 2022-03-17 | End: 2023-02-13

## 2022-03-17 RX ORDER — CLOPIDOGREL BISULFATE 75 MG/1
1 TABLET, FILM COATED ORAL
Qty: 0 | Refills: 0 | DISCHARGE

## 2022-03-17 RX ORDER — NALOXONE HYDROCHLORIDE 4 MG/.1ML
0.1 SPRAY NASAL
Refills: 0 | Status: DISCONTINUED | OUTPATIENT
Start: 2022-03-17 | End: 2022-03-18

## 2022-03-17 RX ORDER — EMPAGLIFLOZIN 10 MG/1
1 TABLET, FILM COATED ORAL
Qty: 0 | Refills: 0 | DISCHARGE

## 2022-03-17 RX ORDER — LANOLIN ALCOHOL/MO/W.PET/CERES
3 CREAM (GRAM) TOPICAL AT BEDTIME
Refills: 0 | Status: ACTIVE | OUTPATIENT
Start: 2022-03-17 | End: 2023-02-13

## 2022-03-17 RX ORDER — DEXTROSE 50 % IN WATER 50 %
25 SYRINGE (ML) INTRAVENOUS ONCE
Refills: 0 | Status: ACTIVE | OUTPATIENT
Start: 2022-03-17

## 2022-03-17 RX ORDER — ASPIRIN/CALCIUM CARB/MAGNESIUM 324 MG
81 TABLET ORAL DAILY
Refills: 0 | Status: ACTIVE | OUTPATIENT
Start: 2022-03-17 | End: 2023-02-13

## 2022-03-17 RX ORDER — KETOROLAC TROMETHAMINE 30 MG/ML
15 SYRINGE (ML) INJECTION EVERY 6 HOURS
Refills: 0 | Status: DISCONTINUED | OUTPATIENT
Start: 2022-03-17 | End: 2022-03-17

## 2022-03-17 RX ORDER — INSULIN LISPRO 100/ML
VIAL (ML) SUBCUTANEOUS
Refills: 0 | Status: ACTIVE | OUTPATIENT
Start: 2022-03-17 | End: 2023-02-13

## 2022-03-17 RX ORDER — ENOXAPARIN SODIUM 100 MG/ML
40 INJECTION SUBCUTANEOUS EVERY 24 HOURS
Refills: 0 | Status: ACTIVE | OUTPATIENT
Start: 2022-03-17 | End: 2023-02-13

## 2022-03-17 RX ADMIN — MORPHINE SULFATE 30 MILLILITER(S): 50 CAPSULE, EXTENDED RELEASE ORAL at 16:59

## 2022-03-17 RX ADMIN — Medication 25 MILLIGRAM(S): at 22:06

## 2022-03-17 RX ADMIN — ENOXAPARIN SODIUM 40 MILLIGRAM(S): 100 INJECTION SUBCUTANEOUS at 17:30

## 2022-03-17 RX ADMIN — ATORVASTATIN CALCIUM 80 MILLIGRAM(S): 80 TABLET, FILM COATED ORAL at 22:06

## 2022-03-17 RX ADMIN — MORPHINE SULFATE 30 MILLILITER(S): 50 CAPSULE, EXTENDED RELEASE ORAL at 13:10

## 2022-03-17 RX ADMIN — INSULIN GLARGINE 15 UNIT(S): 100 INJECTION, SOLUTION SUBCUTANEOUS at 22:09

## 2022-03-17 RX ADMIN — MORPHINE SULFATE 30 MILLILITER(S): 50 CAPSULE, EXTENDED RELEASE ORAL at 19:06

## 2022-03-17 RX ADMIN — SODIUM CHLORIDE 75 MILLILITER(S): 9 INJECTION, SOLUTION INTRAVENOUS at 17:29

## 2022-03-17 NOTE — H&P ADULT - NSHPREVIEWOFSYSTEMS_GEN_ALL_CORE
REVIEW OF SYSTEMS:    CONSTITUTIONAL: Denies any fatigue, weakness, fevers or chills  EYES/ENT: No visual changes;  No vertigo or throat pain   NECK: No pain or stiffness  RESPIRATORY: No cough, wheezing, hemoptysis; No shortness of breath  CARDIOVASCULAR: No chest pain or palpitations  GASTROINTESTINAL: No epigastric pain. No nausea, vomiting, or hematemesis; No diarrhea or constipation. No melena or hematochezia.  GENITOURINARY: No dysuria, frequency or hematuria  NEUROLOGICAL: No numbness or weakness  SKIN: No itching, burning, rashes, or lesions   All other review of systems is negative unless indicated above.

## 2022-03-17 NOTE — PRE PROCEDURE NOTE - PRE PROCEDURE EVALUATION
Patient is a 52 yo F with h/o HTN, obesity, DM2, uterine fibroids complicated by severe anemia due to severe vaginal bleeding since being started on Brilinta s/p mLAD MONI (10/12/2021) for NSTEMI. Pt required blood transfusions on 11/24/21 and 12/27/21.  She was switched from Brilinta to Plavix and was started on Norethindrone. She states she had no bleeding since last blood transfusion till 03/04/22 when mild vaginal bleeding started again. She is scheduled for Uterine Artery Embolization on 03/17/22. Case discussed with Dr. PEDRO Pierson (anesthesia) - less than 6 mo after MONI - OK to proceed.     Covid test negative on  03/14/22.

## 2022-03-17 NOTE — PROCEDURE NOTE - PROCEDURE FINDINGS AND DETAILS
Successful angioembolization of left uterine artery and right ovarian artery supplying large uterine fibroids. Multiple selective catheterizations and angiograms.

## 2022-03-17 NOTE — PROCEDURE NOTE - PLAN
- Maintain kumar catheter until pt can ambulate (~4PM)  - Strict bedrest with straight legs for 4hrs (4PM); reevaluate pt's groin prior to d/c from IR PACU  - PCA pump overnight; c/s placed to acute pain service  - Transition off PCA in AM; plan to d/c when pt can tolerate diet and has adequate pain control on oral medications. IR to discuss oral pain rx w/admitting service   - f/u pt's groin in AM

## 2022-03-17 NOTE — H&P ADULT - PROBLEM SELECTOR PLAN 1
on PCA pump, IR following, TOV at 4 pm  monitor  had endometrial biopsy last year was benign  opt follow up with GYN and IR, plan for eventual OTILIO

## 2022-03-17 NOTE — PATIENT PROFILE ADULT - FALL HARM RISK - UNIVERSAL INTERVENTIONS
Bed in lowest position, wheels locked, appropriate side rails in place/Call bell, personal items and telephone in reach/Instruct patient to call for assistance before getting out of bed or chair/Non-slip footwear when patient is out of bed/[place] to call system/Physically safe environment - no spills, clutter or unnecessary equipment/Purposeful Proactive Rounding/Room/bathroom lighting operational, light cord in reach

## 2022-03-17 NOTE — H&P ADULT - HISTORY OF PRESENT ILLNESS
50 yo F with h/o HTN, obesity, DM2, uterine fibroids complicated by severe anemia due to severe vaginal bleeding since being started on Brilinta s/p mLAD MONI (10/12/2021) for NSTEMI. Pt required blood transfusions on 11/24/21 and 12/27/21.  She was switched from Brilinta to Plavix and was started on Norethindrone but still had intermittent vaginal bleeding she is now s/p Uterine Artery Embolization on 03/17/22. No chest pain, sob, palpitations, has some mild lower abdominal pain but improved with the PCA pump.

## 2022-03-17 NOTE — H&P ADULT - ASSESSMENT
52 yo F with h/o  DM2, CAD s/p MONI to mLAD m uterine fibroids is now s/p uterine artery embolization with IR admitted for pain control and monitoring

## 2022-03-17 NOTE — H&P ADULT - PROBLEM SELECTOR PLAN 3
a1c 8.5 hold home oral diabetic meds  lantus 30 u qhs at home  patient has been NPO for almost 24hrs, would start lantus 15u qhs at night  mod aiss  fs tid ac  Fs goal 140-180

## 2022-03-17 NOTE — H&P ADULT - NSHPPHYSICALEXAM_GEN_ALL_CORE
Vital Signs Last 24 Hrs  T(C): 36.7 (17 Mar 2022 11:50), Max: 36.8 (17 Mar 2022 08:13)  T(F): 98 (17 Mar 2022 11:50), Max: 98.3 (17 Mar 2022 08:13)  HR: 78 (17 Mar 2022 15:50) (75 - 88)  BP: 143/80 (17 Mar 2022 15:50) (133/79 - 145/87)  BP(mean): --  RR: 15 (17 Mar 2022 15:50) (15 - 19)  SpO2: 100% (17 Mar 2022 15:50) (100% - 100%)    CONSTITUTIONAL: Well-groomed, in no apparent distress  EYES: No conjunctival or scleral injection, non-icteric; PERRL and symmetric  ENMT: No external nasal lesions; nasal mucosa not inflamed; normal dentition; no pharyngeal injection or exudates, oral mucosa with moist membranes  NECK: no JVD  RESPIRATORY: Breathing comfortably; no dullness to percussion; lungs CTA without wheeze/rhonchi/rales  CARDIOVASCULAR: +S1S2, RRR, no M/G/R; no carotid bruits; pedal pulses full and symmetric; no lower extremity edema  GASTROINTESTINAL: mild tenderness to palpation in lower abdominal quadrants, +BS throughout, no rebound/guarding  GENITOURINARY: kumar + draining clear yellow urine  MUSCULOSKELETAL: no digital clubbing or cyanosis; no paraspinal tenderness; R groin no hematoma  NEUROLOGIC: no facial droop  sensation intact in LEs b/l to light touch  PSYCHIATRIC: A+O x 3; mood and affect appropriate; appropriate insight and judgment

## 2022-03-17 NOTE — ASU PATIENT PROFILE, ADULT - FALL HARM RISK - UNIVERSAL INTERVENTIONS
Bed in lowest position, wheels locked, appropriate side rails in place/Call bell, personal items and telephone in reach/Instruct patient to call for assistance before getting out of bed or chair/Non-slip footwear when patient is out of bed/[place] to call system/Physically safe environment - no spills, clutter or unnecessary equipment/Purposeful Proactive Rounding/Room/bathroom lighting operational, light cord in reach

## 2022-03-18 ENCOUNTER — TRANSCRIPTION ENCOUNTER (OUTPATIENT)
Age: 52
End: 2022-03-18

## 2022-03-18 VITALS
OXYGEN SATURATION: 99 % | RESPIRATION RATE: 18 BRPM | SYSTOLIC BLOOD PRESSURE: 125 MMHG | TEMPERATURE: 98 F | DIASTOLIC BLOOD PRESSURE: 78 MMHG | HEART RATE: 92 BPM

## 2022-03-18 LAB
ANION GAP SERPL CALC-SCNC: 13 MMOL/L — SIGNIFICANT CHANGE UP (ref 5–17)
ANION GAP SERPL CALC-SCNC: 20 MMOL/L — HIGH (ref 5–17)
BUN SERPL-MCNC: 13 MG/DL — SIGNIFICANT CHANGE UP (ref 7–23)
BUN SERPL-MCNC: 14 MG/DL — SIGNIFICANT CHANGE UP (ref 7–23)
CALCIUM SERPL-MCNC: 8.8 MG/DL — SIGNIFICANT CHANGE UP (ref 8.4–10.5)
CALCIUM SERPL-MCNC: <3 MG/DL — CRITICAL LOW (ref 8.4–10.5)
CHLORIDE SERPL-SCNC: 101 MMOL/L — SIGNIFICANT CHANGE UP (ref 96–108)
CHLORIDE SERPL-SCNC: 98 MMOL/L — SIGNIFICANT CHANGE UP (ref 96–108)
CO2 SERPL-SCNC: 17 MMOL/L — LOW (ref 22–31)
CO2 SERPL-SCNC: 24 MMOL/L — SIGNIFICANT CHANGE UP (ref 22–31)
CREAT SERPL-MCNC: 0.53 MG/DL — SIGNIFICANT CHANGE UP (ref 0.5–1.3)
CREAT SERPL-MCNC: 0.54 MG/DL — SIGNIFICANT CHANGE UP (ref 0.5–1.3)
EGFR: 112 ML/MIN/1.73M2 — SIGNIFICANT CHANGE UP
EGFR: 113 ML/MIN/1.73M2 — SIGNIFICANT CHANGE UP
GLUCOSE BLDC GLUCOMTR-MCNC: 159 MG/DL — HIGH (ref 70–99)
GLUCOSE BLDC GLUCOMTR-MCNC: 181 MG/DL — HIGH (ref 70–99)
GLUCOSE BLDC GLUCOMTR-MCNC: 206 MG/DL — HIGH (ref 70–99)
GLUCOSE SERPL-MCNC: 143 MG/DL — HIGH (ref 70–99)
GLUCOSE SERPL-MCNC: 186 MG/DL — HIGH (ref 70–99)
HCT VFR BLD CALC: 31.7 % — LOW (ref 34.5–45)
HCT VFR BLD CALC: 35.2 % — SIGNIFICANT CHANGE UP (ref 34.5–45)
HGB BLD-MCNC: 9 G/DL — LOW (ref 11.5–15.5)
HGB BLD-MCNC: 9.9 G/DL — LOW (ref 11.5–15.5)
MAGNESIUM SERPL-MCNC: 1.8 MG/DL — SIGNIFICANT CHANGE UP (ref 1.6–2.6)
MCHC RBC-ENTMCNC: 19.3 PG — LOW (ref 27–34)
MCHC RBC-ENTMCNC: 19.4 PG — LOW (ref 27–34)
MCHC RBC-ENTMCNC: 28.1 GM/DL — LOW (ref 32–36)
MCHC RBC-ENTMCNC: 28.4 GM/DL — LOW (ref 32–36)
MCV RBC AUTO: 68 FL — LOW (ref 80–100)
MCV RBC AUTO: 68.9 FL — LOW (ref 80–100)
NRBC # BLD: 0 /100 WBCS — SIGNIFICANT CHANGE UP (ref 0–0)
NRBC # BLD: 0 /100 WBCS — SIGNIFICANT CHANGE UP (ref 0–0)
PLATELET # BLD AUTO: 274 K/UL — SIGNIFICANT CHANGE UP (ref 150–400)
PLATELET # BLD AUTO: 288 K/UL — SIGNIFICANT CHANGE UP (ref 150–400)
POTASSIUM SERPL-MCNC: 4 MMOL/L — SIGNIFICANT CHANGE UP (ref 3.5–5.3)
POTASSIUM SERPL-MCNC: >9 MMOL/L — CRITICAL HIGH (ref 3.5–5.3)
POTASSIUM SERPL-SCNC: 4 MMOL/L — SIGNIFICANT CHANGE UP (ref 3.5–5.3)
POTASSIUM SERPL-SCNC: >9 MMOL/L — CRITICAL HIGH (ref 3.5–5.3)
RBC # BLD: 4.66 M/UL — SIGNIFICANT CHANGE UP (ref 3.8–5.2)
RBC # BLD: 5.11 M/UL — SIGNIFICANT CHANGE UP (ref 3.8–5.2)
RBC # FLD: 21.8 % — HIGH (ref 10.3–14.5)
RBC # FLD: 22.3 % — HIGH (ref 10.3–14.5)
SODIUM SERPL-SCNC: 135 MMOL/L — SIGNIFICANT CHANGE UP (ref 135–145)
SODIUM SERPL-SCNC: 138 MMOL/L — SIGNIFICANT CHANGE UP (ref 135–145)
WBC # BLD: 12.92 K/UL — HIGH (ref 3.8–10.5)
WBC # BLD: 13.42 K/UL — HIGH (ref 3.8–10.5)
WBC # FLD AUTO: 12.92 K/UL — HIGH (ref 3.8–10.5)
WBC # FLD AUTO: 13.42 K/UL — HIGH (ref 3.8–10.5)

## 2022-03-18 PROCEDURE — 82962 GLUCOSE BLOOD TEST: CPT

## 2022-03-18 PROCEDURE — 86900 BLOOD TYPING SEROLOGIC ABO: CPT

## 2022-03-18 PROCEDURE — C1769: CPT

## 2022-03-18 PROCEDURE — 86850 RBC ANTIBODY SCREEN: CPT

## 2022-03-18 PROCEDURE — C1887: CPT

## 2022-03-18 PROCEDURE — 99232 SBSQ HOSP IP/OBS MODERATE 35: CPT

## 2022-03-18 PROCEDURE — 99231 SBSQ HOSP IP/OBS SF/LOW 25: CPT

## 2022-03-18 PROCEDURE — 86901 BLOOD TYPING SEROLOGIC RH(D): CPT

## 2022-03-18 PROCEDURE — 36415 COLL VENOUS BLD VENIPUNCTURE: CPT

## 2022-03-18 PROCEDURE — 85027 COMPLETE CBC AUTOMATED: CPT

## 2022-03-18 PROCEDURE — 36247 INS CATH ABD/L-EXT ART 3RD: CPT

## 2022-03-18 PROCEDURE — C1889: CPT

## 2022-03-18 PROCEDURE — 37243 VASC EMBOLIZE/OCCLUDE ORGAN: CPT

## 2022-03-18 PROCEDURE — C1894: CPT

## 2022-03-18 PROCEDURE — 83735 ASSAY OF MAGNESIUM: CPT

## 2022-03-18 PROCEDURE — 76937 US GUIDE VASCULAR ACCESS: CPT

## 2022-03-18 PROCEDURE — C1760: CPT

## 2022-03-18 PROCEDURE — 80048 BASIC METABOLIC PNL TOTAL CA: CPT

## 2022-03-18 RX ORDER — PANTOPRAZOLE SODIUM 20 MG/1
40 TABLET, DELAYED RELEASE ORAL
Refills: 0 | Status: DISCONTINUED | OUTPATIENT
Start: 2022-03-18 | End: 2022-03-18

## 2022-03-18 RX ORDER — IBUPROFEN 200 MG
600 TABLET ORAL EVERY 8 HOURS
Refills: 0 | Status: DISCONTINUED | OUTPATIENT
Start: 2022-03-18 | End: 2022-03-18

## 2022-03-18 RX ORDER — SENNA PLUS 8.6 MG/1
2 TABLET ORAL
Qty: 6 | Refills: 0
Start: 2022-03-18 | End: 2022-03-20

## 2022-03-18 RX ORDER — OXYCODONE AND ACETAMINOPHEN 5; 325 MG/1; MG/1
1 TABLET ORAL EVERY 6 HOURS
Refills: 0 | Status: DISCONTINUED | OUTPATIENT
Start: 2022-03-18 | End: 2022-03-18

## 2022-03-18 RX ORDER — IBUPROFEN 200 MG
1 TABLET ORAL
Qty: 15 | Refills: 0
Start: 2022-03-18 | End: 2022-03-22

## 2022-03-18 RX ORDER — SENNA PLUS 8.6 MG/1
2 TABLET ORAL AT BEDTIME
Refills: 0 | Status: DISCONTINUED | OUTPATIENT
Start: 2022-03-18 | End: 2022-03-18

## 2022-03-18 RX ORDER — ONDANSETRON 8 MG/1
4 TABLET, FILM COATED ORAL EVERY 8 HOURS
Refills: 0 | Status: DISCONTINUED | OUTPATIENT
Start: 2022-03-18 | End: 2022-03-18

## 2022-03-18 RX ORDER — PANTOPRAZOLE SODIUM 20 MG/1
1 TABLET, DELAYED RELEASE ORAL
Qty: 5 | Refills: 0
Start: 2022-03-18 | End: 2022-03-22

## 2022-03-18 RX ADMIN — Medication 2: at 08:19

## 2022-03-18 RX ADMIN — Medication 81 MILLIGRAM(S): at 11:31

## 2022-03-18 RX ADMIN — CLOPIDOGREL BISULFATE 75 MILLIGRAM(S): 75 TABLET, FILM COATED ORAL at 11:34

## 2022-03-18 RX ADMIN — Medication 600 MILLIGRAM(S): at 14:47

## 2022-03-18 RX ADMIN — MORPHINE SULFATE 30 MILLILITER(S): 50 CAPSULE, EXTENDED RELEASE ORAL at 07:24

## 2022-03-18 RX ADMIN — Medication 25 MILLIGRAM(S): at 05:29

## 2022-03-18 RX ADMIN — Medication 600 MILLIGRAM(S): at 15:45

## 2022-03-18 RX ADMIN — Medication 4: at 12:56

## 2022-03-18 NOTE — DISCHARGE NOTE PROVIDER - PROVIDER TOKENS
PROVIDER:[TOKEN:[[id number]]] PROVIDER:[TOKEN:[[id number]],FOLLOWUP:[1 month]],FREE:[LAST:[GYN],PHONE:[(   )    -],FAX:[(   )    -],FOLLOWUP:[1 week]]

## 2022-03-18 NOTE — PROGRESS NOTE ADULT - SUBJECTIVE AND OBJECTIVE BOX
Patient is a 51y old  Female who presents with a chief complaint of uterine artery embolization (18 Mar 2022 09:26)      SUBJECTIVE / OVERNIGHT EVENTS: Patient seen and examined at bedside. States that she feels ok, denies any pain, states she continues to have significant vaginal bleeding today. Denies any CP, SOB, dizzines, blurry vision and n/v.     ROS:  All other review of systems negative    Allergies    No Known Allergies    Intolerances        MEDICATIONS  (STANDING):  aspirin  chewable 81 milliGRAM(s) Oral daily  atorvastatin 80 milliGRAM(s) Oral at bedtime  clopidogrel Tablet 75 milliGRAM(s) Oral daily  dextrose 40% Gel 15 Gram(s) Oral once  dextrose 5%. 1000 milliLiter(s) (50 mL/Hr) IV Continuous <Continuous>  dextrose 50% Injectable 25 Gram(s) IV Push once  enoxaparin Injectable 40 milliGRAM(s) SubCutaneous every 24 hours  glucagon  Injectable 1 milliGRAM(s) IntraMuscular once  insulin glargine Injectable (LANTUS) 15 Unit(s) SubCutaneous at bedtime  insulin lispro (ADMELOG) corrective regimen sliding scale   SubCutaneous three times a day before meals  lactated ringers. 1000 milliLiter(s) (75 mL/Hr) IV Continuous <Continuous>  metoprolol succinate ER 25 milliGRAM(s) Oral daily    MEDICATIONS  (PRN):  acetaminophen     Tablet .. 650 milliGRAM(s) Oral every 6 hours PRN Temp greater or equal to 38C (100.4F), Mild Pain (1 - 3)  aluminum hydroxide/magnesium hydroxide/simethicone Suspension 30 milliLiter(s) Oral every 4 hours PRN Dyspepsia  melatonin 3 milliGRAM(s) Oral at bedtime PRN Insomnia      Vital Signs Last 24 Hrs  T(C): 36.9 (18 Mar 2022 13:31), Max: 36.9 (18 Mar 2022 10:00)  T(F): 98.5 (18 Mar 2022 13:31), Max: 98.5 (18 Mar 2022 10:00)  HR: 100 (18 Mar 2022 13:31) (76 - 100)  BP: 110/69 (18 Mar 2022 13:31) (110/69 - 145/81)  BP(mean): --  RR: 18 (18 Mar 2022 13:31) (15 - 18)  SpO2: 98% (18 Mar 2022 13:31) (97% - 100%)  CAPILLARY BLOOD GLUCOSE      POCT Blood Glucose.: 206 mg/dL (18 Mar 2022 12:54)  POCT Blood Glucose.: 159 mg/dL (18 Mar 2022 08:16)  POCT Blood Glucose.: 156 mg/dL (17 Mar 2022 22:09)  POCT Blood Glucose.: 129 mg/dL (17 Mar 2022 18:16)    I&O's Summary    17 Mar 2022 07:01  -  18 Mar 2022 07:00  --------------------------------------------------------  IN: 1735 mL / OUT: 1550 mL / NET: 185 mL    18 Mar 2022 07:01  -  18 Mar 2022 13:44  --------------------------------------------------------  IN: 480 mL / OUT: 0 mL / NET: 480 mL        PHYSICAL EXAM:  GENERAL: NAD, well-developed  HEAD:  Atraumatic, Normocephalic  EYES: EOMI, PERRLA, conjunctiva and sclera clear  NECK: Supple, No JVD  CHEST/LUNG: Clear to auscultation bilaterally; No wheeze  HEART: Regular rate and rhythm; No murmurs, rubs, or gallops  ABDOMEN: Soft, Nontender, Nondistended; Bowel sounds present  EXTREMITIES:  2+ Peripheral Pulses, No clubbing, cyanosis, or edema  NEUROLOGY: AAOx3, non-focal  PSYCH: calm  SKIN: right groin site dressing c/d/i     LABS:                        9.0    12.92 )-----------( 274      ( 18 Mar 2022 11:31 )             31.7     03-18    135  |  98  |  13  ----------------------------<  143<H>  >9.0<HH>   |  17<L>  |  0.53    Ca    <3.0<LL>      18 Mar 2022 11:30                RADIOLOGY & ADDITIONAL TESTS:    Consultant(s) Notes Reviewed:  Anesthesia rec noted    Care Discussed with Consultants/Other Providers: Medicine ACP and IR resident Dr. Smalls 
Day 1 of Anesthesia Pain Management Service    SUBJECTIVE: Doing well    Pain Scale Score:	[X] Refer to charted pain scores    THERAPY:    [X ] IV PCA Morphine		        [ ] 5 mg/mL	[ ] 1 mg/mL  Demand dose: 1 mg     Lockout: 6 minutes   Continuous Rate: 0 mg/hr  4 Hour Limit: 14 mg    MEDICATIONS  (STANDING):  aspirin  chewable 81 milliGRAM(s) Oral daily  atorvastatin 80 milliGRAM(s) Oral at bedtime  clopidogrel Tablet 75 milliGRAM(s) Oral daily  dextrose 40% Gel 15 Gram(s) Oral once  dextrose 5%. 1000 milliLiter(s) (50 mL/Hr) IV Continuous <Continuous>  dextrose 50% Injectable 25 Gram(s) IV Push once  enoxaparin Injectable 40 milliGRAM(s) SubCutaneous every 24 hours  glucagon  Injectable 1 milliGRAM(s) IntraMuscular once  insulin glargine Injectable (LANTUS) 15 Unit(s) SubCutaneous at bedtime  insulin lispro (ADMELOG) corrective regimen sliding scale   SubCutaneous three times a day before meals  lactated ringers. 1000 milliLiter(s) (75 mL/Hr) IV Continuous <Continuous>  metoprolol succinate ER 25 milliGRAM(s) Oral daily  morphine PCA (5 mG/mL) 30 milliLiter(s) PCA Continuous PCA Continuous    MEDICATIONS  (PRN):  acetaminophen     Tablet .. 650 milliGRAM(s) Oral every 6 hours PRN Temp greater or equal to 38C (100.4F), Mild Pain (1 - 3)  aluminum hydroxide/magnesium hydroxide/simethicone Suspension 30 milliLiter(s) Oral every 4 hours PRN Dyspepsia  melatonin 3 milliGRAM(s) Oral at bedtime PRN Insomnia  naloxone Injectable 0.1 milliGRAM(s) IV Push every 3 minutes PRN For ANY of the following changes in patient status:  A. RR LESS THAN 10 breaths per minute, B. Oxygen saturation LESS THAN 90%, C. Sedation score of 6  ondansetron Injectable 4 milliGRAM(s) IV Push every 6 hours PRN Nausea      OBJECTIVE:    Sedation Score:	[ X] Alert	 [ ] Drowsy 	[ ] Arousable	[ ] Asleep	[ ] Unresponsive    Side Effects:	[X ] None	[ ] Nausea	[ ] Vomiting	[ ] Pruritus  		[ ] Other:    Vital Signs Last 24 Hrs  T(C): 36.7 (18 Mar 2022 05:24), Max: 36.8 (17 Mar 2022 18:19)  T(F): 98.1 (18 Mar 2022 05:24), Max: 98.3 (17 Mar 2022 18:19)  HR: 80 (18 Mar 2022 05:24) (75 - 95)  BP: 122/73 (18 Mar 2022 05:24) (122/73 - 145/87)  BP(mean): --  RR: 18 (18 Mar 2022 05:24) (15 - 19)  SpO2: 98% (18 Mar 2022 05:24) (97% - 100%)    ASSESSMENT/ PLAN    Therapy to  be:               [  ] Continued   [X ] Discontinued   [ X] Changed to PRN Analgesics    Documentation and Verification of current medications:   [X] Done	[ ] Not done, not eligible    Comments: Transition to prn analgesics 
Interventional Radiology Follow-Up Note.         This is a 51y Female s/p Uterine fibroid embolization on 3/17 in Interventional Radiology with Dr. Núñez.   PCA d/c'd this AM. Denies abdominal cramping, n/v.   Zabala d/c'd. Urinating well.   Reports vaginal bleeding that is getting better. Soaks through 2 light pads compare to 2 large pads. Reports that this menustral cycle lasts 2 weeks this cycle was started on 3/7.    Medication:   aspirin  chewable: (03-18)  clopidogrel Tablet: (03-18)  enoxaparin Injectable: (03-17)  metoprolol succinate ER: (03-18)    Vitals:   T(F): 98.5, Max: 98.5 (10:00)  HR: 100  BP: 110/69  RR: 18  SpO2: 98%    Physical Exam:  General: Nontoxic, in NAD.  Abdomen: soft, NTND.   Extremities:  right groin clean, dry and intact, soft with no evidence of hematoma, right femoral/dp/pt pulses +2. No pedal edema or calf tenderness noted.     LABS:                         9.0    12.92 )-----------( 274      ( 18 Mar 2022 11:31 )             31.7       03-18    138  |  101  |  14  ----------------------------<  186<H>  4.0   |  24  |  0.54    Ca    8.8      18 Mar 2022 13:22  Mg     1.8     03-18                              CAPILLARY BLOOD GLUCOSE      POCT Blood Glucose.: 206 mg/dL (18 Mar 2022 12:54)                Assessment/Plan:   50 yo F with h/o HTN, obesity, DM2, uterine fibroids complicated by severe anemia due to severe vaginal bleeding since being started on A/c s/p mLAD MONI (10/12/2021) for NSTEMI requiring blood transfusion. S/p UFE on 3/17.  Pt currently with menorrhagia.    - 3PM CBC pending.   - repeat K pending.  - Pt to be discharged home with Motrin 600mg q8hrs X 5 days. Percocet 5/325mg q6hrs PRN for 3days, senna at bedtime for 3 days, Zofran 4mg ODT q8hrs PRN X 2 days, Protonix 40mg qd X 5days.  - Planning for d/c home Pending labs.  - F/u with GYN in 1-2 weeks.  - F/u with IR in one month.  - d/w ALAN chan and DR. núñez    Please call IR  [phone] with any questions, concerns, or issues regarding above.    Also available on Teams.

## 2022-03-18 NOTE — PROVIDER CONTACT NOTE (CRITICAL VALUE NOTIFICATION) - SITUATION
HIgh critical potassium and calcium levels HIgh critical potassium (greater than 9.0) and calcium levels (less than 3.0)

## 2022-03-18 NOTE — DISCHARGE NOTE PROVIDER - CARE PROVIDERS DIRECT ADDRESSES
,[email or web address]

## 2022-03-18 NOTE — DISCHARGE NOTE PROVIDER - NSDCMRMEDTOKEN_GEN_ALL_CORE_FT
aspirin 81 mg oral tablet, chewable: 1 tab(s) orally once a day  atorvastatin 80 mg oral tablet: 1 tab(s) orally once a day (at bedtime)  Basaglar KwikPen 100 units/mL subcutaneous solution: 30 unit(s) subcutaneous once a day (at bedtime) MDD:1   clopidogrel 75 mg oral tablet: 1 tab(s) orally once a day  iron 27 m tab(s) orally once a day  Jardiance 10 mg oral tablet: 1 tab(s) orally once a day   metFORMIN 1000 mg oral tablet: 1 tab(s) orally 2 times a day  metoprolol succinate 25 mg oral tablet, extended release: 1 tab(s) orally once a day  norethindrone 5 mg oral tablet: 1 tab(s) orally once a day  Ozempic 2 mg/1.5 mL (0.25 mg or 0.5 mg dose) subcutaneous solution: 0.5 milligram(s) subcutaneously once a week    aspirin 81 mg oral tablet, chewable: 1 tab(s) orally once a day  atorvastatin 80 mg oral tablet: 1 tab(s) orally once a day (at bedtime)  Basaglar KwikPen 100 units/mL subcutaneous solution: 30 unit(s) subcutaneous once a day (at bedtime) MDD:1   clopidogrel 75 mg oral tablet: 1 tab(s) orally once a day  iron 27 m tab(s) orally once a day  Jardiance 10 mg oral tablet: 1 tab(s) orally once a day   metFORMIN 1000 mg oral tablet: 1 tab(s) orally 2 times a day  metoprolol succinate 25 mg oral tablet, extended release: 1 tab(s) orally once a day  norethindrone 5 mg oral tablet: 1 tab(s) orally once a day  oxycodone-acetaminophen 5 mg-325 mg oral tablet: 1 tab(s) orally every 6 hours, As needed, Moderate Pain (4 - 6) MDD:4 tabs  Ozempic 2 mg/1.5 mL (0.25 mg or 0.5 mg dose) subcutaneous solution: 0.5 milligram(s) subcutaneously once a week    aspirin 81 mg oral tablet, chewable: 1 tab(s) orally once a day  atorvastatin 80 mg oral tablet: 1 tab(s) orally once a day (at bedtime)  Basaglar KwikPen 100 units/mL subcutaneous solution: 30 unit(s) subcutaneous once a day (at bedtime) MDD:1   clopidogrel 75 mg oral tablet: 1 tab(s) orally once a day  ibuprofen 600 mg oral tablet: 1 tab(s) orally every 8 hours  iron 27 m tab(s) orally once a day  Jardiance 10 mg oral tablet: 1 tab(s) orally once a day   metFORMIN 1000 mg oral tablet: 1 tab(s) orally 2 times a day  metoprolol succinate 25 mg oral tablet, extended release: 1 tab(s) orally once a day  norethindrone 5 mg oral tablet: 1 tab(s) orally once a day  oxycodone-acetaminophen 5 mg-325 mg oral tablet: 1 tab(s) orally every 6 hours, As needed, Moderate Pain (4 - 6) MDD:4 tabs  Ozempic 2 mg/1.5 mL (0.25 mg or 0.5 mg dose) subcutaneous solution: 0.5 milligram(s) subcutaneously once a week   pantoprazole 40 mg oral delayed release tablet: 1 tab(s) orally once a day (before a meal)  senna oral tablet: 2 tab(s) orally once a day (at bedtime)

## 2022-03-18 NOTE — PROGRESS NOTE ADULT - PROBLEM SELECTOR PLAN 1
pain resolved, PCA pump d/c'ed  Passed TOV   note to have 2 point drop in h/h this morning and pt reports significant vaginal bleeding   repeat CBC at 3 PM today  d/w IR, will eval patient today.  had endometrial biopsy last year was benign, opt follow up with GYN and IR, plan for eventual OTILIO

## 2022-03-18 NOTE — DISCHARGE NOTE PROVIDER - NSDCFUSCHEDAPPT_GEN_ALL_CORE_FT
GAVIN SANDOVAL ; 03/31/2022 ; [facility] GAVIN Huitron ; 04/28/2022 ; [facility]  GAVIN SANDOVAL ; 05/12/2022 ; [facility]

## 2022-03-18 NOTE — DISCHARGE NOTE PROVIDER - CARE PROVIDER_API CALL
Valente James  INTERVENTIONAL RADIOLOGY AND DIAGNOSTIC RADIOLOGY  [address] [place] [address]  Phone: [phone]  Fax: [phone]  Follow Up Time:    Valente James  INTERVENTIONAL RADIOLOGY AND DIAGNOSTIC RADIOLOGY  [address] [place] [address]  Phone: [phone]  Fax: [phone]  Follow Up Time: 1 month    GYN,   Phone: (   )    -  Fax: (   )    -  Follow Up Time: 1 week

## 2022-03-18 NOTE — PROGRESS NOTE ADULT - ATTENDING COMMENTS
Pain Management Attending Addendum    SUBJECTIVE: Patient doing well with IV PCA    Therapy: Morphine IV PCA    [X] IV PCA         [ ] PRN Analgesics    OBJECTIVE:   [X] Pain appropriately controlled    [ ] Other:    Side Effects:  [X] None	             [ ] Nausea              [ ] Pruritis                	[ ] Other:    ASSESSMENT/PLAN:  Therapy changed to PRN analgesics    Comments:

## 2022-03-18 NOTE — DISCHARGE NOTE NURSING/CASE MANAGEMENT/SOCIAL WORK - NSDCPEFALRISK_GEN_ALL_CORE
For information on Fall & Injury Prevention, visit: [email or web address] OR  [email or web address] OR  https://www.cdc.gov/steadi/patient.html

## 2022-03-18 NOTE — PROGRESS NOTE ADULT - PROBLEM SELECTOR PLAN 3
a1c 8.5 hold home oral diabetic meds  lantus 30 u qhs at home  patient has been NPO for almost 24hrs, would start lantus 15u qhs at night  mod aiss  fs tid ac  Fs goal 140-180      Dispo: f/u repeat BMP and CBC, pending IR clearance and stable hb prior to discharge home.

## 2022-03-18 NOTE — DISCHARGE NOTE NURSING/CASE MANAGEMENT/SOCIAL WORK - PATIENT PORTAL LINK FT
You can access the FollowMyHealth Patient Portal offered by [facility] by registering at the following website: [email or web address]. By joining [employer]’s FollowMyHealth portal, you will also be able to view your health information using other applications (apps) compatible with our system.

## 2022-03-18 NOTE — DISCHARGE NOTE PROVIDER - HOSPITAL COURSE
52 yo F with h/o  DM2, CAD s/p MONI to mLAD m uterine fibroids is now s/p uterine artery embolization with IR admitted for pain control and monitoring    Problem/Plan - 1:  ·  Problem: Status post embolization of uterine artery.   ·  Plan: pain resolved, PCA pump d/c'ed  Passed TOV   note to have 2 point drop in h/h this morning and pt reports significant vaginal bleeding   repeat CBC at 3 PM today  d/w IR, will eval patient today.  had endometrial biopsy last year was benign, opt follow up with GYN and IR, plan for eventual OTILIO.    Problem/Plan - 2:  ·  Problem: CAD (coronary artery disease).   ·  Plan: s/p MONI to mLAD 2021  c/w aspirin, plavix, statin.    Problem/Plan - 3:  ·  Problem: Type 2 diabetes mellitus.   ·  Plan: a1c 8.5 hold home oral diabetic meds  lantus 30 u qhs at home  patient has been NPO for almost 24hrs, would start lantus 15u qhs at night  mod aiss  fs tid ac  Fs goal 140-180      Repeat BMP and CBC Stable, IR is ok to discharge pt home. 50 yo F with h/o  DM2, CAD s/p MONI to mLAD m uterine fibroids is now s/p uterine artery embolization with IR admitted for pain control and monitoring    # Status post embolization of uterine artery.   pain resolved, PCA pump d/c'ed  Passed TOV   repeat CBC stable d/w IR cleared for d/c from their stand point   had endometrial biopsy last year was benign, opt follow up with GYN and IR, plan for eventual OTILIO.  PAtient to F/u with IR in one month.    She is medically stable for d/c home today

## 2022-03-30 PROBLEM — E66.9 OBESITY, UNSPECIFIED: Chronic | Status: ACTIVE | Noted: 2022-03-14

## 2022-03-30 PROBLEM — D64.9 ANEMIA, UNSPECIFIED: Chronic | Status: ACTIVE | Noted: 2022-03-14

## 2022-03-30 PROBLEM — I21.9 ACUTE MYOCARDIAL INFARCTION, UNSPECIFIED: Chronic | Status: ACTIVE | Noted: 2021-12-27

## 2022-03-30 PROBLEM — I83.93 ASYMPTOMATIC VARICOSE VEINS OF BILATERAL LOWER EXTREMITIES: Chronic | Status: ACTIVE | Noted: 2022-03-14

## 2022-03-30 PROBLEM — I25.10 ATHEROSCLEROTIC HEART DISEASE OF NATIVE CORONARY ARTERY WITHOUT ANGINA PECTORIS: Chronic | Status: ACTIVE | Noted: 2022-03-14

## 2022-03-30 PROBLEM — E11.9 TYPE 2 DIABETES MELLITUS WITHOUT COMPLICATIONS: Chronic | Status: ACTIVE | Noted: 2021-04-11

## 2022-03-30 PROBLEM — D25.9 LEIOMYOMA OF UTERUS, UNSPECIFIED: Chronic | Status: ACTIVE | Noted: 2022-03-14

## 2022-03-30 PROBLEM — Z87.440 PERSONAL HISTORY OF URINARY (TRACT) INFECTIONS: Chronic | Status: ACTIVE | Noted: 2022-03-14

## 2022-03-31 ENCOUNTER — APPOINTMENT (OUTPATIENT)
Dept: CARDIOLOGY | Facility: CLINIC | Age: 52
End: 2022-03-31

## 2022-04-13 ENCOUNTER — APPOINTMENT (OUTPATIENT)
Dept: INTERVENTIONAL RADIOLOGY/VASCULAR | Facility: CLINIC | Age: 52
End: 2022-04-13
Payer: COMMERCIAL

## 2022-04-13 DIAGNOSIS — D64.9 ANEMIA, UNSPECIFIED: ICD-10-CM

## 2022-04-13 DIAGNOSIS — Z87.42 PERSONAL HISTORY OF OTHER DISEASES OF THE FEMALE GENITAL TRACT: ICD-10-CM

## 2022-04-13 DIAGNOSIS — Z79.01 LONG TERM (CURRENT) USE OF ANTICOAGULANTS: ICD-10-CM

## 2022-04-13 PROCEDURE — 99212 OFFICE O/P EST SF 10 MIN: CPT | Mod: 95

## 2022-04-24 ENCOUNTER — RX RENEWAL (OUTPATIENT)
Age: 52
End: 2022-04-24

## 2022-04-28 ENCOUNTER — APPOINTMENT (OUTPATIENT)
Dept: CARDIOLOGY | Facility: CLINIC | Age: 52
End: 2022-04-28
Payer: COMMERCIAL

## 2022-04-28 PROCEDURE — 93306 TTE W/DOPPLER COMPLETE: CPT

## 2022-05-12 ENCOUNTER — NON-APPOINTMENT (OUTPATIENT)
Age: 52
End: 2022-05-12

## 2022-05-12 ENCOUNTER — APPOINTMENT (OUTPATIENT)
Dept: CARDIOLOGY | Facility: CLINIC | Age: 52
End: 2022-05-12
Payer: COMMERCIAL

## 2022-05-12 ENCOUNTER — LABORATORY RESULT (OUTPATIENT)
Age: 52
End: 2022-05-12

## 2022-05-12 VITALS
HEIGHT: 66 IN | HEART RATE: 81 BPM | SYSTOLIC BLOOD PRESSURE: 110 MMHG | DIASTOLIC BLOOD PRESSURE: 75 MMHG | OXYGEN SATURATION: 99 % | BODY MASS INDEX: 32.62 KG/M2 | WEIGHT: 203 LBS

## 2022-05-12 DIAGNOSIS — R07.89 OTHER CHEST PAIN: ICD-10-CM

## 2022-05-12 PROCEDURE — 99214 OFFICE O/P EST MOD 30 MIN: CPT

## 2022-05-12 PROCEDURE — 93000 ELECTROCARDIOGRAM COMPLETE: CPT

## 2022-05-14 LAB
ALBUMIN SERPL ELPH-MCNC: 4.6 G/DL
ALP BLD-CCNC: 112 U/L
ALT SERPL-CCNC: 20 U/L
ANION GAP SERPL CALC-SCNC: 12 MMOL/L
AST SERPL-CCNC: 19 U/L
BASOPHILS # BLD AUTO: 0.05 K/UL
BASOPHILS NFR BLD AUTO: 0.8 %
BILIRUB SERPL-MCNC: 0.3 MG/DL
BUN SERPL-MCNC: 14 MG/DL
CALCIUM SERPL-MCNC: 10.2 MG/DL
CHLORIDE SERPL-SCNC: 104 MMOL/L
CO2 SERPL-SCNC: 26 MMOL/L
CREAT SERPL-MCNC: 0.63 MG/DL
EGFR: 108 ML/MIN/1.73M2
EOSINOPHIL # BLD AUTO: 0.12 K/UL
EOSINOPHIL NFR BLD AUTO: 1.8 %
GLUCOSE SERPL-MCNC: 231 MG/DL
HCT VFR BLD CALC: 42.7 %
HGB BLD-MCNC: 11.9 G/DL
IMM GRANULOCYTES NFR BLD AUTO: 0.3 %
IRON SATN MFR SERPL: 7 %
IRON SERPL-MCNC: 30 UG/DL
LYMPHOCYTES # BLD AUTO: 1.63 K/UL
LYMPHOCYTES NFR BLD AUTO: 24.5 %
MAN DIFF?: NORMAL
MCHC RBC-ENTMCNC: 20.7 PG
MCHC RBC-ENTMCNC: 27.9 GM/DL
MCV RBC AUTO: 74.1 FL
MONOCYTES # BLD AUTO: 0.45 K/UL
MONOCYTES NFR BLD AUTO: 6.8 %
NEUTROPHILS # BLD AUTO: 4.39 K/UL
NEUTROPHILS NFR BLD AUTO: 65.8 %
PLATELET # BLD AUTO: 310 K/UL
POTASSIUM SERPL-SCNC: 4.5 MMOL/L
PROT SERPL-MCNC: 7.4 G/DL
RBC # BLD: 5.76 M/UL
RBC # FLD: 19.9 %
SODIUM SERPL-SCNC: 142 MMOL/L
TIBC SERPL-MCNC: 448 UG/DL
UIBC SERPL-MCNC: 419 UG/DL
WBC # FLD AUTO: 6.66 K/UL

## 2022-06-04 PROBLEM — R07.89 CHEST DISCOMFORT: Status: ACTIVE | Noted: 2022-05-12

## 2022-06-04 NOTE — PHYSICAL EXAM
[Well Developed] : well developed [Well Nourished] : well nourished [No Acute Distress] : no acute distress [Normal Conjunctiva] : normal conjunctiva [Normal Venous Pressure] : normal venous pressure [No Carotid Bruit] : no carotid bruit [Normal S1, S2] : normal S1, S2 [No Murmur] : no murmur [No Rub] : no rub [No Gallop] : no gallop [Clear Lung Fields] : clear lung fields [Good Air Entry] : good air entry [No Respiratory Distress] : no respiratory distress  [Soft] : abdomen soft [Non Tender] : non-tender [No Masses/organomegaly] : no masses/organomegaly [Normal Bowel Sounds] : normal bowel sounds [Normal Gait] : normal gait [No Edema] : no edema [No Cyanosis] : no cyanosis [No Clubbing] : no clubbing [No Varicosities] : no varicosities [No Rash] : no rash [No Skin Lesions] : no skin lesions [Moves all extremities] : moves all extremities [No Focal Deficits] : no focal deficits [Normal Speech] : normal speech [Alert and Oriented] : alert and oriented [Normal memory] : normal memory [de-identified] : Right radial - clean/dry/intact with no thrill

## 2022-06-04 NOTE — REASON FOR VISIT
[Structural Heart and Valve Disease] : structural heart and valve disease [Hyperlipidemia] : hyperlipidemia [Hypertension] : hypertension [FreeTextEntry1] : 51-year-old female with a past medical history significant for\par \par Coronary artery disease s/p NSTEMI/PCI (mLAD) - 10/11/2021\par Type 2 diabetes mellitus (hemoglobin A1c 11.4%, Metformin)\par Varicose veins of bilateral lower extremities\par Cholecystectomy\par Stab phlebectomy\par Tubal ligation bilaterally\par Pre-menopause\par \par Since she was in the hospital she is doing better.  Has had initially a few episodes when she felt that something was sticking her that occurred a few days after the procedure.  When she ambulates no chest pain/tightness/discomfort, light-headed sensation, dizziness or palpations.  No blood in her stool or urine.  No fevers, chills or sweats.  Prior to the episodes she had severe anxiety with chest pain that has now resolved when she gets anxious.  Ms. Phoenix ambulates 2.5 miles in the morning.  Notes significant changes in her diet.  Has a decrease in appetite and has cut portion sizes.  Eating more vegetables.  Cutting out fried foods.   \par ================\par 1/13/2022\par Twice the patient was in the ER for severe blood loss for which she was anemic.  She was last in the ER the day after [place].  The patient has large fibroids.  She is getting injections by her gynecologist.  The patient was transitioned from Brillinta to clopidogrel 75mg daily.  Ms. Phoenix had her blood work checked on 12/27/2021.\par ==============\par 2/24/2022\par The patient reports that she feels okay, sometimes.  Suffers from anxiety.  No chest pain/tightness/discomfort, shortness of breath, lower extremity swelling, abdominal pain, fevers or chills.  The patient is able to walk unlimited distances.  The patient has not had any bleeding issues for over a month.  When she develops severe bleeding feels light-headed and dizzy.  No current formal exercise.  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    \par \par =============\par 5/12/2022\par The patient sometimes experiences a sharp pain in the left side of her chest.  It can occur at rest or upon exertion.  It last for a few seconds.  No provoking events.  No shortness of breath, lower extremity swelling, PND or change in orthopnea (1-2 pillows).  No fevers, chills or sweats.  The patient was recently seen by IR for following for her symptomatic uterine fibroids.  She is status post UFE (incl right ovarian A that supplied ovarian fibroid) on 3/17/22.  Ms Phoenix reports feeling well overall & states that she had menses during UFE which stopped 4/11/22 - states menstrual flow was not heavy - utilized light pads. She denies fever, chills, pelvic pain, dysuria, n/v/d, no access site issues.\par \par Gynecologist - Dr. 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  She presented on October 11, 2021 with a NSTEMI.  She underwent cardiac catheterization and found to have severe obstructive disease of the left anterior descending artery.  Middle left anterior descending artery drug-eluting stent was inserted.  The patient tolerated the procedure well.  No discomfort in her wrist.\par \par Overall she is now doing well with no cardiopulmonary complaints at rest or upon exertion.  Taking all her medications as prescribed.\par \par --Differential for chest discomfort was reviewed.  Atypical in nature.  Reviewed catheterization at time of visit.  Severe 1 CAD s/p PCI of LAD (October 11, 2021).  No other significant disease.\par --Repeat labs will be sent.  Indications for laboratory work was gone over.\par --The patient is status post UFE for secondary anemia hx requiring multiple transfusions 11 & 12/2021. \par -- Continue on aspirin 81mg daily and clopidogrel 75mg daily.  Signs and symptoms of bleeding were gone over.\par -- The patient was recently hospitalized with severe vaginal bleeding from anemia (2 hospitalizations - 2 days).  Each hospitalization she has been transfused 2U of PRBC.  She was treated with Aygestin 5mg x10 days \par --Reviewed TTE from 4/28/2022 with the patient.\par --If clinically doing well and EKG/TTE is stable in nature at 6-12 months anniversary of stent will consider discontinuation of clopidogrel.  Continue aspirin 81mg daily with aspirin.  Avoid all NSAIDs.  May take Tylenol for discomfort.\par --Reviewed laboratory work from 11/1/2021 with the patient.\par --She underwent MRI on 1/4/2022 for better evaluation of endometrium per last US read showing bulky uterus with asymmetric thickening of endometrium infiltrating into anterior myometrium and to rule out neoplasm.  Results are pending.\par --Status post drug-eluting stent/[place] on October 12, 2021.  Radial approach.  LVEDP 12 mmHg.  No aortic valve stenosis.  Normal left main with right dominant system.\par --Labs have been ordered to see if CBC is stable.  Indications for studies were gone over.\par --Continue Ozempic, Jardiance and metformin.\par --Continue atorvastatin 80 mg daily.\par --Continue ferrous sulfate.\par --Recommend a repeat TTE in 6 months to 12 months.  Indications and details of study was gone over.\par --The patient is morbidity obese (BMI).  Discussed strategies to lose weight and short and long term complications.\par --EKG performed do to history of coronary artery disease, hyperlipidemia and hypertension.\par --It was reviewed with the patient the importance of following a heart healthy lifestyle. AHA/ACC guidelines stress the importance of lifestyle modifications to lower cardiovascular disease risk. This includes eating a heart-healthy diet (fresh fruits/vegetable, whole grains, limit intake of sweets, sugar-sweetened beverages, red meats). He was encouraged to perform regular aerobic exercises at least 30 minutes of moderate intensity exercise at least 4 times a week and to maintain a desirable body weight and avoid tobacco products or second hand exposure.\par \par All questions and concerns of the patient were addressed.  Follow-up 3-4 months.

## 2022-08-02 ENCOUNTER — RX RENEWAL (OUTPATIENT)
Age: 52
End: 2022-08-02

## 2022-08-08 NOTE — ED CDU PROVIDER DISPOSITION NOTE - NS_EDPROVIDERDISPOUSERTYPE_ED_A_ED
Patient with diffuse urticarial rash to unknown trigger, no relief from diphenhydramine, will rx steroids and atarax. f/u with PMD in 1 week. Return to the ED immediately if getting worse, not improving, or if having any new or troubling symptoms. Attending Attestation (For Attendings USE Only)...

## 2022-08-16 ENCOUNTER — RX RENEWAL (OUTPATIENT)
Age: 52
End: 2022-08-16

## 2022-08-19 NOTE — ED ADULT NURSE NOTE - TEMPLATE LIST FOR HEAD TO TOE ASSESSMENT
General You can access the FollowMyHealth Patient Portal offered by [facility] by registering at the following website: [email or web address]. By joining [employer]’s FollowMyHealth portal, you will also be able to view your health information using other applications (apps) compatible with our system.

## 2022-08-25 ENCOUNTER — APPOINTMENT (OUTPATIENT)
Dept: CARDIOLOGY | Facility: CLINIC | Age: 52
End: 2022-08-25

## 2022-08-25 ENCOUNTER — NON-APPOINTMENT (OUTPATIENT)
Age: 52
End: 2022-08-25

## 2022-08-25 VITALS — OXYGEN SATURATION: 100 % | SYSTOLIC BLOOD PRESSURE: 109 MMHG | HEART RATE: 85 BPM | DIASTOLIC BLOOD PRESSURE: 75 MMHG

## 2022-08-25 PROCEDURE — 93000 ELECTROCARDIOGRAM COMPLETE: CPT

## 2022-08-25 PROCEDURE — 99214 OFFICE O/P EST MOD 30 MIN: CPT

## 2022-08-26 ENCOUNTER — RX RENEWAL (OUTPATIENT)
Age: 52
End: 2022-08-26

## 2022-09-12 ENCOUNTER — APPOINTMENT (OUTPATIENT)
Dept: MRI IMAGING | Facility: CLINIC | Age: 52
End: 2022-09-12

## 2022-09-12 ENCOUNTER — OUTPATIENT (OUTPATIENT)
Dept: OUTPATIENT SERVICES | Facility: HOSPITAL | Age: 52
LOS: 1 days | End: 2022-09-12
Payer: COMMERCIAL

## 2022-09-12 DIAGNOSIS — Z98.51 TUBAL LIGATION STATUS: Chronic | ICD-10-CM

## 2022-09-12 DIAGNOSIS — Z95.5 PRESENCE OF CORONARY ANGIOPLASTY IMPLANT AND GRAFT: Chronic | ICD-10-CM

## 2022-09-12 DIAGNOSIS — Z00.8 ENCOUNTER FOR OTHER GENERAL EXAMINATION: ICD-10-CM

## 2022-09-12 DIAGNOSIS — D64.9 ANEMIA, UNSPECIFIED: ICD-10-CM

## 2022-09-12 DIAGNOSIS — Z90.49 ACQUIRED ABSENCE OF OTHER SPECIFIED PARTS OF DIGESTIVE TRACT: Chronic | ICD-10-CM

## 2022-09-12 DIAGNOSIS — Z87.42 PERSONAL HISTORY OF OTHER DISEASES OF THE FEMALE GENITAL TRACT: ICD-10-CM

## 2022-09-12 DIAGNOSIS — R93.5 ABNORMAL FINDINGS ON DIAGNOSTIC IMAGING OF OTHER ABDOMINAL REGIONS, INCLUDING RETROPERITONEUM: ICD-10-CM

## 2022-09-12 DIAGNOSIS — D25.9 LEIOMYOMA OF UTERUS, UNSPECIFIED: ICD-10-CM

## 2022-09-12 PROCEDURE — 72197 MRI PELVIS W/O & W/DYE: CPT

## 2022-09-12 PROCEDURE — A9585: CPT

## 2022-09-12 PROCEDURE — 72197 MRI PELVIS W/O & W/DYE: CPT | Mod: 26

## 2022-09-14 NOTE — PHYSICAL EXAM
[Well Developed] : well developed [Well Nourished] : well nourished [No Acute Distress] : no acute distress [Normal Conjunctiva] : normal conjunctiva [Normal Venous Pressure] : normal venous pressure [No Carotid Bruit] : no carotid bruit [Normal S1, S2] : normal S1, S2 [No Murmur] : no murmur [No Rub] : no rub [No Gallop] : no gallop [Clear Lung Fields] : clear lung fields [Good Air Entry] : good air entry [No Respiratory Distress] : no respiratory distress  [Soft] : abdomen soft [Non Tender] : non-tender [No Masses/organomegaly] : no masses/organomegaly [Normal Bowel Sounds] : normal bowel sounds [Normal Gait] : normal gait [No Edema] : no edema [No Cyanosis] : no cyanosis [No Clubbing] : no clubbing [No Varicosities] : no varicosities [No Rash] : no rash [No Skin Lesions] : no skin lesions [Moves all extremities] : moves all extremities [No Focal Deficits] : no focal deficits [Normal Speech] : normal speech [Alert and Oriented] : alert and oriented [Normal memory] : normal memory [de-identified] : Right radial - clean/dry/intact with no thrill

## 2022-09-14 NOTE — REASON FOR VISIT
[Structural Heart and Valve Disease] : structural heart and valve disease [Hyperlipidemia] : hyperlipidemia [Hypertension] : hypertension [FreeTextEntry1] : 51-year-old female with a past medical history significant for\par \par Coronary artery disease s/p NSTEMI/PCI (mLAD) - 10/11/2021\par Type 2 diabetes mellitus (hemoglobin A1c 11.4%, Metformin)\par Varicose veins of bilateral lower extremities\par Cholecystectomy\par Stab phlebectomy\par Tubal ligation bilaterally\par Pre-menopause\par \par Since she was in the hospital she is doing better.  Has had initially a few episodes when she felt that something was sticking her that occurred a few days after the procedure.  When she ambulates no chest pain/tightness/discomfort, light-headed sensation, dizziness or palpations.  No blood in her stool or urine.  No fevers, chills or sweats.  Prior to the episodes she had severe anxiety with chest pain that has now resolved when she gets anxious.  Ms. Phoenix ambulates 2.5 miles in the morning.  Notes significant changes in her diet.  Has a decrease in appetite and has cut portion sizes.  Eating more vegetables.  Cutting out fried foods.   \par ================\par 1/13/2022\par Twice the patient was in the ER for severe blood loss for which she was anemic.  She was last in the ER the day after [place].  The patient has large fibroids.  She is getting injections by her gynecologist.  The patient was transitioned from Brillinta to clopidogrel 75mg daily.  Ms. Phoenix had her blood work checked on 12/27/2021.\par ==============\par 2/24/2022\par The patient reports that she feels okay, sometimes.  Suffers from anxiety.  No chest pain/tightness/discomfort, shortness of breath, lower extremity swelling, abdominal pain, fevers or chills.  The patient is able to walk unlimited distances.  The patient has not had any bleeding issues for over a month.  When she develops severe bleeding feels light-headed and dizzy.  No current formal exercise.  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    \par \par =============\par 5/12/2022\par The patient sometimes experiences a sharp pain in the left side of her chest.  It can occur at rest or upon exertion.  It last for a few seconds.  No provoking events.  No shortness of breath, lower extremity swelling, PND or change in orthopnea (1-2 pillows).  No fevers, chills or sweats.  The patient was recently seen by IR for following for her symptomatic uterine fibroids.  She is status post UFE (incl right ovarian A that supplied ovarian fibroid) on 3/17/22.  Ms Phoenix reports feeling well overall & states that she had menses during UFE which stopped 4/11/22 - states menstrual flow was not heavy - utilized light pads. She denies fever, chills, pelvic pain, dysuria, n/v/d, no access site issues.\par \par ==================\par 8/25/2022\par The patient is doing well.  No cardiopulmonary complaints at rest or upon exertion.  Walking unlimited distances without any issues.  No formal exercise program.   No shortness of breath, lower extremity swelling, PND or change in orthopnea (1-2 pillows).  No fevers, chills or sweats. No blood in her stool or urine.  No fevers, chills or sweats. \par \par Gynecologist - Dr. 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  She presented on October 11, 2021 with a NSTEMI.  She underwent cardiac catheterization and found to have severe obstructive disease of the left anterior descending artery.  Middle left anterior descending artery drug-eluting stent was inserted.  The patient tolerated the procedure well.  No discomfort in her wrist.\par \par Overall she is now doing well with no cardiopulmonary complaints at rest or upon exertion.  Taking all her medications as prescribed.\par \par --Reviewed catheterization at time of visit.  Severe 1 CAD s/p PCI of LAD (October 11, 2021).  No other significant disease.\par --Reviewed recent laboratory from 5/12/2022.  The patient is going to be seen by her internist.  Asked for blood work to be sent.\par --Reviewed TTE from 4/28/2022 with the patient.  Mild diastolic dysfunction.\par --The patient is status post UFE for secondary anemia hx requiring multiple transfusions 11 & 12/2021. \par --Continue on aspirin 81mg daily and clopidogrel 75mg daily.  Signs and symptoms of bleeding were gone over.\par --The patient was recently hospitalized with severe vaginal bleeding from anemia (2 hospitalizations - 2 days).  Each hospitalization she has been transfused 2U of PRBC.  She was treated with Aygestin 5mg x10 days \par --Reviewed TTE from 4/28/2022 with the patient.\par --If clinically doing well and EKG/TTE is stable in nature at will consider discontinuation of clopidogrel 75mg at time of next visit.  Continue aspirin 81mg daily with aspirin.  Avoid all NSAIDs.  May take Tylenol for discomfort.  Pros/cons and risk/benefits of DAPT therapy was gone over.  \par --Reviewed laboratory work from 11/1/2021 with the patient.\par --She underwent MRI on 1/4/2022 for better evaluation of endometrium per last US read showing bulky uterus with asymmetric thickening of endometrium infiltrating into anterior myometrium and to rule out neoplasm.  Results are pending.\par --Status post drug-eluting stent/[place] on October 12, 2021.  Radial approach.  LVEDP 12 mmHg.  No aortic valve stenosis.  Normal left main with right dominant system.\par --Labs have been ordered to see if CBC is stable.  Indications for studies were gone over.\par --Continue Ozempic, Jardiance and metformin.\par --Continue atorvastatin 80 mg daily.\par --Continue ferrous sulfate.\par --Recommend a repeat TTE in 6 months to 12 months.  Indications and details of study was gone over.\par --The patient is morbidity obese (BMI).  Discussed strategies to lose weight and short and long term complications.\par --EKG performed do to history of coronary artery disease, hyperlipidemia and hypertension.\par --It was reviewed with the patient the importance of following a heart healthy lifestyle. AHA/ACC guidelines stress the importance of lifestyle modifications to lower cardiovascular disease risk. This includes eating a heart-healthy diet (fresh fruits/vegetable, whole grains, limit intake of sweets, sugar-sweetened beverages, red meats). He was encouraged to perform regular aerobic exercises at least 30 minutes of moderate intensity exercise at least 4 times a week and to maintain a desirable body weight and avoid tobacco products or second hand exposure.\par \par All questions and concerns of the patient were addressed.  Follow-up 3-4 months.

## 2022-09-17 ENCOUNTER — INPATIENT (INPATIENT)
Facility: HOSPITAL | Age: 52
LOS: 2 days | Discharge: HOME | DRG: 313 | End: 2022-09-20
Attending: INTERNAL MEDICINE | Admitting: INTERNAL MEDICINE
Payer: COMMERCIAL

## 2022-09-17 VITALS
SYSTOLIC BLOOD PRESSURE: 121 MMHG | HEIGHT: 66 IN | DIASTOLIC BLOOD PRESSURE: 68 MMHG | RESPIRATION RATE: 16 BRPM | HEART RATE: 76 BPM | OXYGEN SATURATION: 95 % | WEIGHT: 210.98 LBS | TEMPERATURE: 98 F

## 2022-09-17 DIAGNOSIS — Z90.49 ACQUIRED ABSENCE OF OTHER SPECIFIED PARTS OF DIGESTIVE TRACT: Chronic | ICD-10-CM

## 2022-09-17 DIAGNOSIS — Z95.5 PRESENCE OF CORONARY ANGIOPLASTY IMPLANT AND GRAFT: Chronic | ICD-10-CM

## 2022-09-17 DIAGNOSIS — Z98.51 TUBAL LIGATION STATUS: Chronic | ICD-10-CM

## 2022-09-17 DIAGNOSIS — I20.0 UNSTABLE ANGINA: ICD-10-CM

## 2022-09-17 LAB
ALBUMIN SERPL ELPH-MCNC: 4.5 G/DL — SIGNIFICANT CHANGE UP (ref 3.3–5)
ALP SERPL-CCNC: 120 U/L — SIGNIFICANT CHANGE UP (ref 40–120)
ALT FLD-CCNC: 15 U/L — SIGNIFICANT CHANGE UP (ref 10–45)
ANION GAP SERPL CALC-SCNC: 11 MMOL/L — SIGNIFICANT CHANGE UP (ref 5–17)
ANISOCYTOSIS BLD QL: SLIGHT — SIGNIFICANT CHANGE UP
APTT BLD: 26.6 SEC — LOW (ref 27.5–35.5)
AST SERPL-CCNC: 16 U/L — SIGNIFICANT CHANGE UP (ref 10–40)
BASOPHILS # BLD AUTO: 0 K/UL — SIGNIFICANT CHANGE UP (ref 0–0.2)
BASOPHILS NFR BLD AUTO: 0 % — SIGNIFICANT CHANGE UP (ref 0–2)
BILIRUB SERPL-MCNC: 0.3 MG/DL — SIGNIFICANT CHANGE UP (ref 0.2–1.2)
BUN SERPL-MCNC: 16 MG/DL — SIGNIFICANT CHANGE UP (ref 7–23)
CALCIUM SERPL-MCNC: 8.8 MG/DL — SIGNIFICANT CHANGE UP (ref 8.4–10.5)
CHLORIDE SERPL-SCNC: 105 MMOL/L — SIGNIFICANT CHANGE UP (ref 96–108)
CO2 SERPL-SCNC: 23 MMOL/L — SIGNIFICANT CHANGE UP (ref 22–31)
CREAT SERPL-MCNC: 0.61 MG/DL — SIGNIFICANT CHANGE UP (ref 0.5–1.3)
DACRYOCYTES BLD QL SMEAR: SLIGHT — SIGNIFICANT CHANGE UP
EGFR: 108 ML/MIN/1.73M2 — SIGNIFICANT CHANGE UP
ELLIPTOCYTES BLD QL SMEAR: SLIGHT — SIGNIFICANT CHANGE UP
EOSINOPHIL # BLD AUTO: 0.14 K/UL — SIGNIFICANT CHANGE UP (ref 0–0.5)
EOSINOPHIL NFR BLD AUTO: 1.7 % — SIGNIFICANT CHANGE UP (ref 0–6)
GIANT PLATELETS BLD QL SMEAR: PRESENT — SIGNIFICANT CHANGE UP
GLUCOSE BLDC GLUCOMTR-MCNC: 83 MG/DL — SIGNIFICANT CHANGE UP (ref 70–99)
GLUCOSE BLDC GLUCOMTR-MCNC: 93 MG/DL — SIGNIFICANT CHANGE UP (ref 70–99)
GLUCOSE SERPL-MCNC: 104 MG/DL — HIGH (ref 70–99)
HCG SERPL-ACNC: <2 MIU/ML — SIGNIFICANT CHANGE UP
HCT VFR BLD CALC: 36.4 % — SIGNIFICANT CHANGE UP (ref 34.5–45)
HGB BLD-MCNC: 10.3 G/DL — LOW (ref 11.5–15.5)
INR BLD: 1.07 RATIO — SIGNIFICANT CHANGE UP (ref 0.88–1.16)
LYMPHOCYTES # BLD AUTO: 1.26 K/UL — SIGNIFICANT CHANGE UP (ref 1–3.3)
LYMPHOCYTES # BLD AUTO: 15.7 % — SIGNIFICANT CHANGE UP (ref 13–44)
MANUAL SMEAR VERIFICATION: SIGNIFICANT CHANGE UP
MCHC RBC-ENTMCNC: 20.2 PG — LOW (ref 27–34)
MCHC RBC-ENTMCNC: 28.3 GM/DL — LOW (ref 32–36)
MCV RBC AUTO: 71.2 FL — LOW (ref 80–100)
MICROCYTES BLD QL: SLIGHT — SIGNIFICANT CHANGE UP
MONOCYTES # BLD AUTO: 0.7 K/UL — SIGNIFICANT CHANGE UP (ref 0–0.9)
MONOCYTES NFR BLD AUTO: 8.7 % — SIGNIFICANT CHANGE UP (ref 2–14)
NEUTROPHILS # BLD AUTO: 5.95 K/UL — SIGNIFICANT CHANGE UP (ref 1.8–7.4)
NEUTROPHILS NFR BLD AUTO: 73.9 % — SIGNIFICANT CHANGE UP (ref 43–77)
NT-PROBNP SERPL-SCNC: 56 PG/ML — SIGNIFICANT CHANGE UP (ref 0–300)
PLAT MORPH BLD: NORMAL — SIGNIFICANT CHANGE UP
PLATELET # BLD AUTO: 301 K/UL — SIGNIFICANT CHANGE UP (ref 150–400)
POIKILOCYTOSIS BLD QL AUTO: SLIGHT — SIGNIFICANT CHANGE UP
POLYCHROMASIA BLD QL SMEAR: SLIGHT — SIGNIFICANT CHANGE UP
POTASSIUM SERPL-MCNC: 4 MMOL/L — SIGNIFICANT CHANGE UP (ref 3.5–5.3)
POTASSIUM SERPL-SCNC: 4 MMOL/L — SIGNIFICANT CHANGE UP (ref 3.5–5.3)
PROT SERPL-MCNC: 7.7 G/DL — SIGNIFICANT CHANGE UP (ref 6–8.3)
PROTHROM AB SERPL-ACNC: 12.4 SEC — SIGNIFICANT CHANGE UP (ref 10.5–13.4)
RBC # BLD: 5.11 M/UL — SIGNIFICANT CHANGE UP (ref 3.8–5.2)
RBC # FLD: 17.7 % — HIGH (ref 10.3–14.5)
RBC BLD AUTO: ABNORMAL
SARS-COV-2 RNA SPEC QL NAA+PROBE: SIGNIFICANT CHANGE UP
SODIUM SERPL-SCNC: 139 MMOL/L — SIGNIFICANT CHANGE UP (ref 135–145)
TARGETS BLD QL SMEAR: SLIGHT — SIGNIFICANT CHANGE UP
TROPONIN T, HIGH SENSITIVITY RESULT: <6 NG/L — SIGNIFICANT CHANGE UP (ref 0–51)
TROPONIN T, HIGH SENSITIVITY RESULT: <6 NG/L — SIGNIFICANT CHANGE UP (ref 0–51)
WBC # BLD: 8.05 K/UL — SIGNIFICANT CHANGE UP (ref 3.8–10.5)
WBC # FLD AUTO: 8.05 K/UL — SIGNIFICANT CHANGE UP (ref 3.8–10.5)

## 2022-09-17 PROCEDURE — 99285 EMERGENCY DEPT VISIT HI MDM: CPT

## 2022-09-17 PROCEDURE — 93010 ELECTROCARDIOGRAM REPORT: CPT

## 2022-09-17 PROCEDURE — 71045 X-RAY EXAM CHEST 1 VIEW: CPT | Mod: 26

## 2022-09-17 RX ORDER — FAMOTIDINE 10 MG/ML
20 INJECTION INTRAVENOUS ONCE
Refills: 0 | Status: COMPLETED | OUTPATIENT
Start: 2022-09-17 | End: 2022-09-17

## 2022-09-17 RX ORDER — ASPIRIN/CALCIUM CARB/MAGNESIUM 324 MG
324 TABLET ORAL ONCE
Refills: 0 | Status: COMPLETED | OUTPATIENT
Start: 2022-09-17 | End: 2022-09-17

## 2022-09-17 RX ORDER — ATORVASTATIN CALCIUM 80 MG/1
80 TABLET, FILM COATED ORAL AT BEDTIME
Refills: 0 | Status: DISCONTINUED | OUTPATIENT
Start: 2022-09-17 | End: 2022-09-20

## 2022-09-17 RX ORDER — SODIUM CHLORIDE 9 MG/ML
1000 INJECTION, SOLUTION INTRAVENOUS
Refills: 0 | Status: DISCONTINUED | OUTPATIENT
Start: 2022-09-17 | End: 2022-09-20

## 2022-09-17 RX ORDER — CLOPIDOGREL BISULFATE 75 MG/1
75 TABLET, FILM COATED ORAL DAILY
Refills: 0 | Status: DISCONTINUED | OUTPATIENT
Start: 2022-09-17 | End: 2022-09-20

## 2022-09-17 RX ORDER — HEPARIN SODIUM 5000 [USP'U]/ML
5000 INJECTION INTRAVENOUS; SUBCUTANEOUS EVERY 12 HOURS
Refills: 0 | Status: DISCONTINUED | OUTPATIENT
Start: 2022-09-17 | End: 2022-09-20

## 2022-09-17 RX ORDER — INSULIN LISPRO 100/ML
VIAL (ML) SUBCUTANEOUS
Refills: 0 | Status: DISCONTINUED | OUTPATIENT
Start: 2022-09-17 | End: 2022-09-20

## 2022-09-17 RX ORDER — DEXTROSE 50 % IN WATER 50 %
12.5 SYRINGE (ML) INTRAVENOUS ONCE
Refills: 0 | Status: DISCONTINUED | OUTPATIENT
Start: 2022-09-17 | End: 2022-09-20

## 2022-09-17 RX ORDER — GLUCAGON INJECTION, SOLUTION 0.5 MG/.1ML
1 INJECTION, SOLUTION SUBCUTANEOUS ONCE
Refills: 0 | Status: DISCONTINUED | OUTPATIENT
Start: 2022-09-17 | End: 2022-09-20

## 2022-09-17 RX ORDER — METOPROLOL TARTRATE 50 MG
25 TABLET ORAL DAILY
Refills: 0 | Status: DISCONTINUED | OUTPATIENT
Start: 2022-09-17 | End: 2022-09-20

## 2022-09-17 RX ORDER — DEXTROSE 50 % IN WATER 50 %
25 SYRINGE (ML) INTRAVENOUS ONCE
Refills: 0 | Status: DISCONTINUED | OUTPATIENT
Start: 2022-09-17 | End: 2022-09-20

## 2022-09-17 RX ORDER — ASPIRIN/CALCIUM CARB/MAGNESIUM 324 MG
81 TABLET ORAL DAILY
Refills: 0 | Status: DISCONTINUED | OUTPATIENT
Start: 2022-09-17 | End: 2022-09-20

## 2022-09-17 RX ORDER — PANTOPRAZOLE SODIUM 20 MG/1
40 TABLET, DELAYED RELEASE ORAL
Refills: 0 | Status: DISCONTINUED | OUTPATIENT
Start: 2022-09-17 | End: 2022-09-20

## 2022-09-17 RX ORDER — INSULIN GLARGINE 100 [IU]/ML
30 INJECTION, SOLUTION SUBCUTANEOUS AT BEDTIME
Refills: 0 | Status: DISCONTINUED | OUTPATIENT
Start: 2022-09-17 | End: 2022-09-20

## 2022-09-17 RX ORDER — DEXTROSE 50 % IN WATER 50 %
15 SYRINGE (ML) INTRAVENOUS ONCE
Refills: 0 | Status: DISCONTINUED | OUTPATIENT
Start: 2022-09-17 | End: 2022-09-20

## 2022-09-17 RX ADMIN — Medication 81 MILLIGRAM(S): at 20:41

## 2022-09-17 RX ADMIN — HEPARIN SODIUM 5000 UNIT(S): 5000 INJECTION INTRAVENOUS; SUBCUTANEOUS at 20:40

## 2022-09-17 RX ADMIN — Medication 324 MILLIGRAM(S): at 11:33

## 2022-09-17 RX ADMIN — CLOPIDOGREL BISULFATE 75 MILLIGRAM(S): 75 TABLET, FILM COATED ORAL at 20:41

## 2022-09-17 RX ADMIN — ATORVASTATIN CALCIUM 80 MILLIGRAM(S): 80 TABLET, FILM COATED ORAL at 21:12

## 2022-09-17 RX ADMIN — Medication 25 MILLIGRAM(S): at 20:40

## 2022-09-17 RX ADMIN — FAMOTIDINE 20 MILLIGRAM(S): 10 INJECTION INTRAVENOUS at 13:11

## 2022-09-17 RX ADMIN — INSULIN GLARGINE 30 UNIT(S): 100 INJECTION, SOLUTION SUBCUTANEOUS at 21:12

## 2022-09-17 NOTE — ED PROVIDER NOTE - PROGRESS NOTE DETAILS
Attending (Rober Hinojosa D.O.):  Trop < 6, EKG w/o diagnostic ischemic changes. Discussed with Dr. Ugalde. Admit for further cardiac monitoring and likely risk stratification. Cards fellow consulted. Discussed with Dr. Aburto. Admit. all discussed with patient. Amenable to plan. All q answered. Admitted to med w/ cards to see. HDS. Rpt EKG unchanged. Rpt trops ordered.

## 2022-09-17 NOTE — H&P ADULT - NSHPLABSRESULTS_GEN_ALL_CORE
10.3   8.05  )-----------( 301      ( 17 Sep 2022 11:47 )             36.4   09-17    139  |  105  |  16  ----------------------------<  104<H>  4.0   |  23  |  0.61    Ca    8.8      17 Sep 2022 11:47    TPro  7.7  /  Alb  4.5  /  TBili  0.3  /  DBili  x   /  AST  16  /  ALT  15  /  AlkPhos  120  09-17

## 2022-09-17 NOTE — CONSULT NOTE ADULT - ASSESSMENT
Mrs. Phoenix is a 52yoF with CAD s/p MONI in 10/21 presenting to the ED with a 1week hx of CP at rest.     #Chest pain   PMH significant for CAD s/p MONI to mLAD in 10/21 2/2 NSTEMI.   Full CP characterization above, but briefly is atypical. I've reviewed the ECG findings and when comparing to her previous ECG's I feel they are similar. At this time her troponin negative (<6). Given that her CP has been on-going for ~1 week I would expect a her troponin to be positive if her CP represented an ischemic event. She does not report that the CP events are exertional which would be more consistent with Angina.   Given the evidence discussed above I do not believe this represents ischemic chest pain despite her hx.   -Continue Home ASA, Plavix, Metoprolol, and Atorvastatin   -Investigate non-cardiac causes of CP   -Tele  -Do not need to continue trending troponin (CP free, Symptoms x1wk with negative enzyme on presentation, ECG at baseline)      Note incomplete until attending Co-sign.

## 2022-09-17 NOTE — H&P ADULT - HISTORY OF PRESENT ILLNESS
52yoF presenting to the ED with CP. PMH of CAD s/p mLAD MONI (10/12/2021) for NSTEMI on ASA ad Plavix with reported compliance.   Patient reports 1xwk hx of CP, substernal, burning with rads to the throat that occurs at rest and lasts typically ~20min. She was concerned that this was reflux disease and was evaluated at her PCP who performed an ECG with TWI in leads V2-4. The pcp did not have access to previous ECG's and told her to present to the ED for evaluation. The patient states that this CP is similar in character to her NSTEMI but a little different.   Review of old ECG's shows similar TWI in versions noted in:  12/21, 11/21, and 10/21. Troponin in the ED <6. CP free at this time.

## 2022-09-17 NOTE — ED PROVIDER NOTE - NS ED ROS FT
Constitutional:  (-) fever, (-) chills, (-) AMS  Eyes: (-) visual changes  ENMT: (-) neck pain or stiffness  Cardiac: (-) palpitations  Respiratory:  (-) cough (-) respiratory distress.   GI:  (-) nausea (-) vomiting (-) diarrhea (-) abdominal pain.  MS:  (-) back pain   Neuro:  (-) headache (-) numbness (-) tingling (-) focal weakness  Skin:  (-) rash  Except as documented in the HPI,  all other systems are negative
I personally performed the service described in the documentation recorded by the scribe in my presence, and it accurately and completely records my words and actions.

## 2022-09-17 NOTE — PATIENT PROFILE ADULT - FALL HARM RISK - UNIVERSAL INTERVENTIONS
Bed in lowest position, wheels locked, appropriate side rails in place/Call bell, personal items and telephone in reach/Instruct patient to call for assistance before getting out of bed or chair/Non-slip footwear when patient is out of bed/[place] to call system/Physically safe environment - no spills, clutter or unnecessary equipment/Purposeful Proactive Rounding/Room/bathroom lighting operational, light cord in reach

## 2022-09-17 NOTE — ED ADULT NURSE NOTE - OBJECTIVE STATEMENT
Female 6 years old with past medical history of Acute MI, alert and orientedx4 came in for chest pain. Pt reports chest pain intermittent for 5 days, worse for the past few days, 81/0 sharp radiating to her throat associated with mild sob, today it's like pressure 4/10. Denies dizziness, sweating, cough, fever, nausea or vomiting. Pt took Tums with no relief. States it feels like her previous MI. Will monitor.

## 2022-09-17 NOTE — ED PROVIDER NOTE - ATTENDING CONTRIBUTION TO CARE
Attending (Rober Hinojosa D.O.):  I have personally seen and examined this patient. I have performed a substantive portion of the visit including all aspects of the medical decision making. Resident, fellow, and/or ACP note reviewed. I agree on the plan of care except where noted.    see mdm

## 2022-09-17 NOTE — ED PROVIDER NOTE - NSICDXPASTMEDICALHX_GEN_ALL_CORE_FT
Belongings Pt  Admitted with: At bedside:  2Shirts  2underwear  Brush  Soap  Photo    In Locker:  Jacket  Sweatpants (w/strings)  Wallet: I D  Soc   Card, Work I D  Badge+lanyard, Cards  Singh Buckner (opened)  [address] PAST MEDICAL HISTORY:  Acute myocardial infarction 10/11/2021    Anemia due to heavy vaginal bleeding in the setting of anticoagulation s/p DESx1 on 10/12/22, Pt. received blood transfusions on 11/24/21 and 12/27/21    CAD (coronary artery disease)     Diabetes Type 2    History of UTI 12/2021    HTN (hypertension)     Leiomyoma of uterus     Obesity, Class I, BMI 30-34.9     Varicose veins of both lower extremities

## 2022-09-17 NOTE — ED PROVIDER NOTE - OBJECTIVE STATEMENT
52 yof hx of NSTEMI, CAD s/p MONI, HTN, DMT2 p/w from PCP w/ 1 wk of 10/10 episodic substernal CP that radiates to her throat. Occurs at rest, lasts ~20 minutes. Tried tums and PPI w/o relief. This feels like her previous MI. Activity does not make pain worse. Nothing makes pain better. No n/v/d. Does have some diaphoresis when this occurs. No recent illnesses.     Sent from PCP d/t EKG abnormalities (TWI in V2,3,4?). 52 yof hx of NSTEMI, CAD s/p MONI, HTN, DMT2 p/w from PCP w/ 1 wk of 10/10 episodic substernal CP that radiates to her throat. Occurs at rest, lasts ~20 minutes. Tried tums and PPI w/o relief. This feels like her previous MI. Activity does not make pain worse. Nothing makes pain better. No n/v/d. Does have some diaphoresis when this occurs. No recent illnesses.     Sent from PCP Max d/t EKG abnormalities (TWI in V2,3,4?).  Dmitriy Ugalde - cardiologist

## 2022-09-17 NOTE — ED PROVIDER NOTE - PHYSICAL EXAMINATION
CONSTITUTIONAL: well-appearing, in NAD  SKIN: Warm dry, no rashes noted on upper extremities.   HEAD: NCAT  EYES: no scleral icterus, conjunctiva pink  NECK: Supple; non tender  CARD: RRR, no murmurs.  RESP: clear to ausculation b/l in all fields   ABD: soft, non-tender, non-distended  MSK: no pedal edema, no calf tenderness  NEURO: normal ROM. sensation grossly intact.   PSYCH: Cooperative, appropriate.

## 2022-09-17 NOTE — H&P ADULT - ASSESSMENT
A/P     Chest pain r/o ACS   serial CE   seen by cardio in ED     CAD s/p stent in 2021   c/w asa/plavix   check echo     HTN /HLD   c/w home meds / statin     DM-2 :   hold oral meds   c/w insulin / FSBS      advance care planning : d/w patinet regarding advance directive. d/w her regarding intubation / CPR if the need arise. She agrees for everything. remain full code. Time spend 15 min

## 2022-09-17 NOTE — CONSULT NOTE ADULT - ATTENDING COMMENTS
Ms. Phoenix reports intermittent chest pain (non-exertional and not relieved by rest) intermittently for the past week.  Symptoms appear to have resolved with the initiation of PPI. Troponin negative x 2. TWI are old.  Overall no definitive evidence that chest pain is secondary to epicardial coronary artery disease.  Ms. Phoenix did however express that she would like to consider further evaluation with a stress test.  Stress echo or ETT may be reasonable options for further evaluation.

## 2022-09-17 NOTE — H&P ADULT - NSHPPHYSICALEXAM_GEN_ALL_CORE
pt. seen and examined , denies any CP now     Vital Signs Last 24 Hrs  T(C): 36.8 (17 Sep 2022 21:50), Max: 36.8 (17 Sep 2022 20:45)  T(F): 98.2 (17 Sep 2022 21:50), Max: 98.2 (17 Sep 2022 20:45)  HR: 82 (17 Sep 2022 21:50) (71 - 82)  BP: 118/68 (17 Sep 2022 21:50) (104/69 - 121/68)  BP(mean): --  RR: 18 (17 Sep 2022 21:50) (16 - 18)  SpO2: 100% (17 Sep 2022 21:50) (95% - 100%)    Parameters below as of 17 Sep 2022 21:50  Patient On (Oxygen Delivery Method): room air    heent : nc/at   neck : supple, no JVD  lungs : B/L clear , no w/r/r  heart: s1s2 nml  abd : soft, NABS, NT/ND  ext : no e/c/c, pulses 2 +  neuro: aaox3 , no focal deficit

## 2022-09-17 NOTE — ED PROVIDER NOTE - CLINICAL SUMMARY MEDICAL DECISION MAKING FREE TEXT BOX
Attending (Rober Hinojosa D.O.):  52F hx of type 2 DM, nstemi 10/2021 2/2 mLAD occlussion s/p Timothy on asa and plavix, prior uterine art. embolization here for 3 days of left sided chest pressure, with slight radiation to left side of neck w/o nausea, vomiting, diaphoresis, shortness of breath. States pain worse after eating but feels similar to prior heart attack. Hemodynamically stable, not clnically anemic. No signs of fluid overload. No LE edema. Benign abdomen, - ford. Eval for ACS. Low suspiicon for acute abbie. Possible reflux mediated though patient tried antacid w/o relief and no real change in pain character 2/2 food. Unlikely focal PNA. Check labs, cardiac biomarkers,, EKG, CXR, place on cardiac monitor for telemetry monitoring.     PCP: Gulshan Santana 52 yof w/ extensive cardiac hx and RFs presenting from PCP after 1 wk of episodic CP associated w/ diaphoresis     ddx: ACS vs structural cardiac anomaly like wall rupture vs carditis vs GERD vs MSK pain vs costochondritis vs less likely AAA  Possibly ischemic but no definitively ACS-like EKG changes noted w/ cardiac hx and RFs. HDS. so less likely AAA or wall rupture. nonreproducible so less c/f MSK.  Trops, xray. ASA to r/out ACS  Possible admit for monitoring       Attending (Rober Hinojosa D.O.):  52F hx of type 2 DM, nstemi 10/2021 2/2 mLAD occlussion s/p Timothy on asa and plavix, prior uterine art. embolization here for 3 days of left sided chest pressure, with slight radiation to left side of neck w/o nausea, vomiting, diaphoresis, shortness of breath. States pain worse after eating but feels similar to prior heart attack. Hemodynamically stable, not clnically anemic. No signs of fluid overload. No LE edema. Benign abdomen, - ford. Eval for ACS. Low suspiicon for acute abbie. Possible reflux mediated though patient tried antacid w/o relief and no real change in pain character 2/2 food. Unlikely focal PNA. Check labs, cardiac biomarkers,, EKG, CXR, place on cardiac monitor for telemetry monitoring.     PCP: Gulshan Santana

## 2022-09-18 LAB
A1C WITH ESTIMATED AVERAGE GLUCOSE RESULT: 6.6 % — HIGH (ref 4–5.6)
ALBUMIN SERPL ELPH-MCNC: 4.3 G/DL — SIGNIFICANT CHANGE UP (ref 3.3–5)
ALP SERPL-CCNC: 118 U/L — SIGNIFICANT CHANGE UP (ref 40–120)
ALT FLD-CCNC: 12 U/L — SIGNIFICANT CHANGE UP (ref 10–45)
ANION GAP SERPL CALC-SCNC: 8 MMOL/L — SIGNIFICANT CHANGE UP (ref 5–17)
AST SERPL-CCNC: 15 U/L — SIGNIFICANT CHANGE UP (ref 10–40)
BILIRUB SERPL-MCNC: 0.4 MG/DL — SIGNIFICANT CHANGE UP (ref 0.2–1.2)
BUN SERPL-MCNC: 15 MG/DL — SIGNIFICANT CHANGE UP (ref 7–23)
CALCIUM SERPL-MCNC: 9.3 MG/DL — SIGNIFICANT CHANGE UP (ref 8.4–10.5)
CHLORIDE SERPL-SCNC: 104 MMOL/L — SIGNIFICANT CHANGE UP (ref 96–108)
CO2 SERPL-SCNC: 25 MMOL/L — SIGNIFICANT CHANGE UP (ref 22–31)
CREAT SERPL-MCNC: 0.59 MG/DL — SIGNIFICANT CHANGE UP (ref 0.5–1.3)
EGFR: 109 ML/MIN/1.73M2 — SIGNIFICANT CHANGE UP
ESTIMATED AVERAGE GLUCOSE: 143 MG/DL — HIGH (ref 68–114)
GLUCOSE BLDC GLUCOMTR-MCNC: 106 MG/DL — HIGH (ref 70–99)
GLUCOSE BLDC GLUCOMTR-MCNC: 120 MG/DL — HIGH (ref 70–99)
GLUCOSE BLDC GLUCOMTR-MCNC: 123 MG/DL — HIGH (ref 70–99)
GLUCOSE BLDC GLUCOMTR-MCNC: 174 MG/DL — HIGH (ref 70–99)
GLUCOSE SERPL-MCNC: 129 MG/DL — HIGH (ref 70–99)
HCT VFR BLD CALC: 37.4 % — SIGNIFICANT CHANGE UP (ref 34.5–45)
HGB BLD-MCNC: 10.6 G/DL — LOW (ref 11.5–15.5)
MCHC RBC-ENTMCNC: 20.2 PG — LOW (ref 27–34)
MCHC RBC-ENTMCNC: 28.3 GM/DL — LOW (ref 32–36)
MCV RBC AUTO: 71.1 FL — LOW (ref 80–100)
NRBC # BLD: 0 /100 WBCS — SIGNIFICANT CHANGE UP (ref 0–0)
PLATELET # BLD AUTO: 276 K/UL — SIGNIFICANT CHANGE UP (ref 150–400)
POTASSIUM SERPL-MCNC: 4.2 MMOL/L — SIGNIFICANT CHANGE UP (ref 3.5–5.3)
POTASSIUM SERPL-SCNC: 4.2 MMOL/L — SIGNIFICANT CHANGE UP (ref 3.5–5.3)
PROT SERPL-MCNC: 7.5 G/DL — SIGNIFICANT CHANGE UP (ref 6–8.3)
RBC # BLD: 5.26 M/UL — HIGH (ref 3.8–5.2)
RBC # FLD: 17.9 % — HIGH (ref 10.3–14.5)
SODIUM SERPL-SCNC: 137 MMOL/L — SIGNIFICANT CHANGE UP (ref 135–145)
WBC # BLD: 6.07 K/UL — SIGNIFICANT CHANGE UP (ref 3.8–10.5)
WBC # FLD AUTO: 6.07 K/UL — SIGNIFICANT CHANGE UP (ref 3.8–10.5)

## 2022-09-18 PROCEDURE — 99222 1ST HOSP IP/OBS MODERATE 55: CPT | Mod: GC

## 2022-09-18 RX ADMIN — Medication 25 MILLIGRAM(S): at 06:18

## 2022-09-18 RX ADMIN — Medication 81 MILLIGRAM(S): at 11:58

## 2022-09-18 RX ADMIN — ATORVASTATIN CALCIUM 80 MILLIGRAM(S): 80 TABLET, FILM COATED ORAL at 21:09

## 2022-09-18 RX ADMIN — CLOPIDOGREL BISULFATE 75 MILLIGRAM(S): 75 TABLET, FILM COATED ORAL at 11:59

## 2022-09-18 RX ADMIN — HEPARIN SODIUM 5000 UNIT(S): 5000 INJECTION INTRAVENOUS; SUBCUTANEOUS at 17:07

## 2022-09-18 RX ADMIN — PANTOPRAZOLE SODIUM 40 MILLIGRAM(S): 20 TABLET, DELAYED RELEASE ORAL at 06:18

## 2022-09-18 RX ADMIN — INSULIN GLARGINE 30 UNIT(S): 100 INJECTION, SOLUTION SUBCUTANEOUS at 21:09

## 2022-09-18 RX ADMIN — HEPARIN SODIUM 5000 UNIT(S): 5000 INJECTION INTRAVENOUS; SUBCUTANEOUS at 06:18

## 2022-09-18 NOTE — PROGRESS NOTE ADULT - ASSESSMENT
A/P     Chest pain r/o ACS   serial CE   seen by cardio   as per cardio recommendation stress echo ordered     CAD s/p stent in 2021   c/w asa/plavix   check echo     HTN /HLD   c/w home meds / statin     DM-2 :   hold oral meds   c/w insulin / FSBS    dispo: stress echo ordered , once completed and normal , will plan for d/c

## 2022-09-18 NOTE — PROGRESS NOTE ADULT - SUBJECTIVE AND OBJECTIVE BOX
Patient is a 52y old  Female who presents with a chief complaint of CP (17 Sep 2022 13:37)      INTERVAL HPI/OVERNIGHT EVENTS: denies any CP today     T(C): 36.3 (09-18-22 @ 13:21), Max: 37.1 (09-18-22 @ 10:09)  HR: 81 (09-18-22 @ 13:21) (71 - 82)  BP: 110/74 (09-18-22 @ 13:21) (110/74 - 127/77)  RR: 16 (09-18-22 @ 13:21) (16 - 18)  SpO2: 99% (09-18-22 @ 13:21) (99% - 100%)  Wt(kg): --  I&O's Summary    18 Sep 2022 07:01  -  18 Sep 2022 18:21  --------------------------------------------------------  IN: 680 mL / OUT: 0 mL / NET: 680 mL        PAST MEDICAL & SURGICAL HISTORY:  Diabetes  Type 2      HTN (hypertension)      Acute myocardial infarction  10/11/2021      CAD (coronary artery disease)      Obesity, Class I, BMI 30-34.9      Varicose veins of both lower extremities      History of UTI  12/2021      Leiomyoma of uterus      Anemia  due to heavy vaginal bleeding in the setting of anticoagulation s/p DESx1 on 10/12/22, Pt. received blood transfusions on 11/24/21 and 12/27/21      S/P cholecystectomy  2009      H/O tubal ligation  2009      Stented coronary artery  DESx1 to LAD, 10/12/2021          SOCIAL HISTORY  Alcohol:  Tobacco:  Illicit substance use:    FAMILY HISTORY:    REVIEW OF SYSTEMS:  CONSTITUTIONAL: No fever, weight loss, or fatigue  EYES: No eye pain, visual disturbances, or discharge  ENMT:  No difficulty hearing, tinnitus, vertigo; No sinus or throat pain  NECK: No pain or stiffness  RESPIRATORY: No cough, wheezing, chills or hemoptysis; No shortness of breath  CARDIOVASCULAR: No chest pain, palpitations, dizziness, or leg swelling  GASTROINTESTINAL: No abdominal or epigastric pain. No nausea, vomiting, or hematemesis; No diarrhea or constipation. No melena or hematochezia.  GENITOURINARY: No dysuria, frequency, hematuria, or incontinence  NEUROLOGICAL: No headaches, memory loss, loss of strength, numbness, or tremors  SKIN: No itching, burning, rashes, or lesions   LYMPH NODES: No enlarged glands  ENDOCRINE: No heat or cold intolerance; No hair loss  MUSCULOSKELETAL: No joint pain or swelling; No muscle, back, or extremity pain  PSYCHIATRIC: No depression, anxiety, mood swings, or difficulty sleeping  HEME/LYMPH: No easy bruising, or bleeding gums  ALLERY AND IMMUNOLOGIC: No hives or eczema    RADIOLOGY & ADDITIONAL TESTS:    Imaging Personally Reviewed:  [ ] YES  [ ] NO    Consultant(s) Notes Reviewed:  [ ] YES  [ ] NO    PHYSICAL EXAM:  GENERAL: NAD, well-groomed, well-developed  HEAD:  Atraumatic, Normocephalic  EYES: EOMI, PERRLA, conjunctiva and sclera clear  ENMT: No tonsillar erythema, exudates, or enlargement; Moist mucous membranes, Good dentition, No lesions  NECK: Supple, No JVD, Normal thyroid  NERVOUS SYSTEM:  Alert & Oriented X3, Good concentration; Motor Strength 5/5 B/L upper and lower extremities; DTRs 2+ intact and symmetric  CHEST/LUNG: Clear to percussion bilaterally; No rales, rhonchi, wheezing, or rubs  HEART: Regular rate and rhythm; No murmurs, rubs, or gallops  ABDOMEN: Soft, Nontender, Nondistended; Bowel sounds present  EXTREMITIES:  2+ Peripheral Pulses, No clubbing, cyanosis, or edema  LYMPH: No lymphadenopathy noted  SKIN: No rashes or lesions    LABS:                        10.6   6.07  )-----------( 276      ( 18 Sep 2022 07:13 )             37.4     09-18    137  |  104  |  15  ----------------------------<  129<H>  4.2   |  25  |  0.59    Ca    9.3      18 Sep 2022 07:13    TPro  7.5  /  Alb  4.3  /  TBili  0.4  /  DBili  x   /  AST  15  /  ALT  12  /  AlkPhos  118  09-18    PT/INR - ( 17 Sep 2022 11:47 )   PT: 12.4 sec;   INR: 1.07 ratio         PTT - ( 17 Sep 2022 11:47 )  PTT:26.6 sec    CAPILLARY BLOOD GLUCOSE      POCT Blood Glucose.: 106 mg/dL (18 Sep 2022 17:55)  POCT Blood Glucose.: 120 mg/dL (18 Sep 2022 13:14)  POCT Blood Glucose.: 123 mg/dL (18 Sep 2022 10:05)  POCT Blood Glucose.: 93 mg/dL (17 Sep 2022 21:01)  POCT Blood Glucose.: 83 mg/dL (17 Sep 2022 18:46)            MEDICATIONS  (STANDING):  aspirin enteric coated 81 milliGRAM(s) Oral daily  atorvastatin 80 milliGRAM(s) Oral at bedtime  clopidogrel Tablet 75 milliGRAM(s) Oral daily  dextrose 5%. 1000 milliLiter(s) (100 mL/Hr) IV Continuous <Continuous>  dextrose 5%. 1000 milliLiter(s) (50 mL/Hr) IV Continuous <Continuous>  dextrose 50% Injectable 25 Gram(s) IV Push once  dextrose 50% Injectable 12.5 Gram(s) IV Push once  dextrose 50% Injectable 25 Gram(s) IV Push once  glucagon  Injectable 1 milliGRAM(s) IntraMuscular once  heparin   Injectable 5000 Unit(s) SubCutaneous every 12 hours  insulin glargine Injectable (LANTUS) 30 Unit(s) SubCutaneous at bedtime  insulin lispro (ADMELOG) corrective regimen sliding scale   SubCutaneous three times a day before meals  metoprolol succinate ER 25 milliGRAM(s) Oral daily  pantoprazole    Tablet 40 milliGRAM(s) Oral before breakfast    MEDICATIONS  (PRN):  dextrose Oral Gel 15 Gram(s) Oral once PRN Blood Glucose LESS THAN 70 milliGRAM(s)/deciliter      Care Discussed with Consultants/Other Providers [ ] YES  [ ] NO

## 2022-09-19 LAB
GLUCOSE BLDC GLUCOMTR-MCNC: 144 MG/DL — HIGH (ref 70–99)
GLUCOSE BLDC GLUCOMTR-MCNC: 153 MG/DL — HIGH (ref 70–99)
GLUCOSE BLDC GLUCOMTR-MCNC: 173 MG/DL — HIGH (ref 70–99)
GLUCOSE BLDC GLUCOMTR-MCNC: 90 MG/DL — SIGNIFICANT CHANGE UP (ref 70–99)

## 2022-09-19 PROCEDURE — 93306 TTE W/DOPPLER COMPLETE: CPT | Mod: 26

## 2022-09-19 PROCEDURE — 99222 1ST HOSP IP/OBS MODERATE 55: CPT

## 2022-09-19 PROCEDURE — 99233 SBSQ HOSP IP/OBS HIGH 50: CPT | Mod: GC

## 2022-09-19 RX ADMIN — HEPARIN SODIUM 5000 UNIT(S): 5000 INJECTION INTRAVENOUS; SUBCUTANEOUS at 17:21

## 2022-09-19 RX ADMIN — Medication 2: at 12:20

## 2022-09-19 RX ADMIN — HEPARIN SODIUM 5000 UNIT(S): 5000 INJECTION INTRAVENOUS; SUBCUTANEOUS at 05:18

## 2022-09-19 RX ADMIN — Medication 2: at 09:21

## 2022-09-19 RX ADMIN — INSULIN GLARGINE 30 UNIT(S): 100 INJECTION, SOLUTION SUBCUTANEOUS at 21:07

## 2022-09-19 RX ADMIN — PANTOPRAZOLE SODIUM 40 MILLIGRAM(S): 20 TABLET, DELAYED RELEASE ORAL at 05:17

## 2022-09-19 RX ADMIN — Medication 81 MILLIGRAM(S): at 12:19

## 2022-09-19 RX ADMIN — Medication 25 MILLIGRAM(S): at 05:17

## 2022-09-19 RX ADMIN — ATORVASTATIN CALCIUM 80 MILLIGRAM(S): 80 TABLET, FILM COATED ORAL at 21:06

## 2022-09-19 RX ADMIN — CLOPIDOGREL BISULFATE 75 MILLIGRAM(S): 75 TABLET, FILM COATED ORAL at 12:20

## 2022-09-19 NOTE — PROGRESS NOTE ADULT - SUBJECTIVE AND OBJECTIVE BOX
Andrews French, PGY-2  Internal Medicine Team  Pager:  [phone]/ [phone]    Patient is a 52y old  Female who presents with a chief complaint of Chest pain (18 Sep 2022 18:21)      SUBJECTIVE / OVERNIGHT EVENTS:    MEDICATIONS  (STANDING):  aspirin enteric coated 81 milliGRAM(s) Oral daily  atorvastatin 80 milliGRAM(s) Oral at bedtime  clopidogrel Tablet 75 milliGRAM(s) Oral daily  dextrose 5%. 1000 milliLiter(s) (100 mL/Hr) IV Continuous <Continuous>  dextrose 5%. 1000 milliLiter(s) (50 mL/Hr) IV Continuous <Continuous>  dextrose 50% Injectable 25 Gram(s) IV Push once  dextrose 50% Injectable 12.5 Gram(s) IV Push once  dextrose 50% Injectable 25 Gram(s) IV Push once  glucagon  Injectable 1 milliGRAM(s) IntraMuscular once  heparin   Injectable 5000 Unit(s) SubCutaneous every 12 hours  insulin glargine Injectable (LANTUS) 30 Unit(s) SubCutaneous at bedtime  insulin lispro (ADMELOG) corrective regimen sliding scale   SubCutaneous three times a day before meals  metoprolol succinate ER 25 milliGRAM(s) Oral daily  pantoprazole    Tablet 40 milliGRAM(s) Oral before breakfast    MEDICATIONS  (PRN):  dextrose Oral Gel 15 Gram(s) Oral once PRN Blood Glucose LESS THAN 70 milliGRAM(s)/deciliter      OBJECTIVE:    Vital Signs Last 24 Hrs  T(C): 36.8 (19 Sep 2022 05:15), Max: 37.1 (18 Sep 2022 10:09)  T(F): 98.2 (19 Sep 2022 05:15), Max: 98.8 (18 Sep 2022 10:09)  HR: 82 (19 Sep 2022 05:15) (73 - 82)  BP: 109/68 (19 Sep 2022 05:15) (109/68 - 116/76)  BP(mean): --  RR: 16 (19 Sep 2022 05:15) (16 - 16)  SpO2: 99% (19 Sep 2022 05:15) (99% - 99%)    Parameters below as of 19 Sep 2022 05:15  Patient On (Oxygen Delivery Method): room air        CAPILLARY BLOOD GLUCOSE      POCT Blood Glucose.: 174 mg/dL (18 Sep 2022 21:08)  POCT Blood Glucose.: 106 mg/dL (18 Sep 2022 17:55)  POCT Blood Glucose.: 120 mg/dL (18 Sep 2022 13:14)  POCT Blood Glucose.: 123 mg/dL (18 Sep 2022 10:05)    I&O's Summary    18 Sep 2022 07:01  -  19 Sep 2022 07:00  --------------------------------------------------------  IN: 1300 mL / OUT: 0 mL / NET: 1300 mL        PHYSICAL EXAM:  GENERAL: No acute distress, well-developed  HEAD:  Atraumatic, Normocephalic  ENT: EOMI, PERRLA, conjunctiva and sclera clear, Neck supple, No JVD, moist mucosa  CHEST/LUNG: Clear to auscultation bilaterally; No wheeze, equal breath sounds bilaterally   BACK: No spinal tenderness  HEART: Regular rate and rhythm; No murmurs, rubs, or gallops  ABDOMEN: Soft, Nontender, Nondistended; Bowel sounds present  EXTREMITIES:  No clubbing, cyanosis, or edema  PSYCH: Nl behavior, nl affect  NEUROLOGY: AAOx3, non-focal, cranial nerves intact  SKIN: Normal color, No rashes or lesions    LABS:                        10.6   6.07  )-----------( 276      ( 18 Sep 2022 07:13 )             37.4     Auto Eosinophil # x     / Auto Eosinophil % x     / Auto Neutrophil # x     / Auto Neutrophil % x     / BANDS % x                            10.3   8.05  )-----------( 301      ( 17 Sep 2022 11:47 )             36.4     Auto Eosinophil # 0.14  / Auto Eosinophil % 1.7   / Auto Neutrophil # 5.95  / Auto Neutrophil % 73.9  / BANDS % x        09-18    137  |  104  |  15  ----------------------------<  129<H>  4.2   |  25  |  0.59  09-17    139  |  105  |  16  ----------------------------<  104<H>  4.0   |  23  |  0.61    Ca    9.3      18 Sep 2022 07:13  TPro  7.5  /  Alb  4.3  /  TBili  0.4  /  DBili  x   /  AST  15  /  ALT  12  /  AlkPhos  118  09-18  TPro  7.7  /  Alb  4.5  /  TBili  0.3  /  DBili  x   /  AST  16  /  ALT  15  /  AlkPhos  120  09-17    PT/INR - ( 17 Sep 2022 11:47 )   PT: 12.4 sec;   INR: 1.07 ratio         PTT - ( 17 Sep 2022 11:47 )  PTT:26.6 sec            RESPIRATORY  VENT:    ABG:     VBG:     RADIOLOGY & ADDITIONAL TESTS:  (Imaging Personally Reviewed)    Consultant(s) Notes Reviewed:      Care Discussed with Consultants/Other Providers:

## 2022-09-19 NOTE — PROGRESS NOTE ADULT - SUBJECTIVE AND OBJECTIVE BOX
Patient is a 52y old  Female who presents with a chief complaint of Chest pain (19 Sep 2022 07:27)      INTERVAL HPI/OVERNIGHT EVENTS:  T(C): 36.3 (09-19-22 @ 21:31), Max: 37.1 (09-19-22 @ 10:03)  HR: 67 (09-19-22 @ 21:31) (67 - 88)  BP: 117/77 (09-19-22 @ 21:31) (109/68 - 144/76)  RR: 16 (09-19-22 @ 21:31) (16 - 16)  SpO2: 95% (09-19-22 @ 21:31) (95% - 100%)  Wt(kg): --  I&O's Summary    18 Sep 2022 07:01  -  19 Sep 2022 07:00  --------------------------------------------------------  IN: 1300 mL / OUT: 0 mL / NET: 1300 mL    19 Sep 2022 07:01  -  19 Sep 2022 22:06  --------------------------------------------------------  IN: 770 mL / OUT: 0 mL / NET: 770 mL        PAST MEDICAL & SURGICAL HISTORY:  Diabetes  Type 2      HTN (hypertension)      Acute myocardial infarction  10/11/2021      CAD (coronary artery disease)      Obesity, Class I, BMI 30-34.9      Varicose veins of both lower extremities      History of UTI  12/2021      Leiomyoma of uterus      Anemia  due to heavy vaginal bleeding in the setting of anticoagulation s/p DESx1 on 10/12/22, Pt. received blood transfusions on 11/24/21 and 12/27/21      S/P cholecystectomy  2009      H/O tubal ligation  2009      Stented coronary artery  DESx1 to LAD, 10/12/2021          SOCIAL HISTORY  Alcohol:  Tobacco:  Illicit substance use:    FAMILY HISTORY:    REVIEW OF SYSTEMS:  CONSTITUTIONAL: No fever, weight loss, or fatigue  EYES: No eye pain, visual disturbances, or discharge  ENMT:  No difficulty hearing, tinnitus, vertigo; No sinus or throat pain  NECK: No pain or stiffness  RESPIRATORY: No cough, wheezing, chills or hemoptysis; No shortness of breath  CARDIOVASCULAR: No chest pain, palpitations, dizziness, or leg swelling  GASTROINTESTINAL: No abdominal or epigastric pain. No nausea, vomiting, or hematemesis; No diarrhea or constipation. No melena or hematochezia.  GENITOURINARY: No dysuria, frequency, hematuria, or incontinence  NEUROLOGICAL: No headaches, memory loss, loss of strength, numbness, or tremors  SKIN: No itching, burning, rashes, or lesions   LYMPH NODES: No enlarged glands  ENDOCRINE: No heat or cold intolerance; No hair loss  MUSCULOSKELETAL: No joint pain or swelling; No muscle, back, or extremity pain  PSYCHIATRIC: No depression, anxiety, mood swings, or difficulty sleeping  HEME/LYMPH: No easy bruising, or bleeding gums  ALLERY AND IMMUNOLOGIC: No hives or eczema    RADIOLOGY & ADDITIONAL TESTS:    Imaging Personally Reviewed:  [ ] YES  [ ] NO    Consultant(s) Notes Reviewed:  [ ] YES  [ ] NO    PHYSICAL EXAM:  GENERAL: NAD, well-groomed, well-developed  HEAD:  Atraumatic, Normocephalic  EYES: EOMI, PERRLA, conjunctiva and sclera clear  ENMT: No tonsillar erythema, exudates, or enlargement; Moist mucous membranes, Good dentition, No lesions  NECK: Supple, No JVD, Normal thyroid  NERVOUS SYSTEM:  Alert & Oriented X3, Good concentration; Motor Strength 5/5 B/L upper and lower extremities; DTRs 2+ intact and symmetric  CHEST/LUNG: Clear to percussion bilaterally; No rales, rhonchi, wheezing, or rubs  HEART: Regular rate and rhythm; No murmurs, rubs, or gallops  ABDOMEN: Soft, Nontender, Nondistended; Bowel sounds present  EXTREMITIES:  2+ Peripheral Pulses, No clubbing, cyanosis, or edema  LYMPH: No lymphadenopathy noted  SKIN: No rashes or lesions    LABS:                        10.6   6.07  )-----------( 276      ( 18 Sep 2022 07:13 )             37.4     09-18    137  |  104  |  15  ----------------------------<  129<H>  4.2   |  25  |  0.59    Ca    9.3      18 Sep 2022 07:13    TPro  7.5  /  Alb  4.3  /  TBili  0.4  /  DBili  x   /  AST  15  /  ALT  12  /  AlkPhos  118  09-18        CAPILLARY BLOOD GLUCOSE      POCT Blood Glucose.: 144 mg/dL (19 Sep 2022 21:06)  POCT Blood Glucose.: 90 mg/dL (19 Sep 2022 17:20)  POCT Blood Glucose.: 173 mg/dL (19 Sep 2022 12:18)  POCT Blood Glucose.: 153 mg/dL (19 Sep 2022 09:20)            MEDICATIONS  (STANDING):  aspirin enteric coated 81 milliGRAM(s) Oral daily  atorvastatin 80 milliGRAM(s) Oral at bedtime  clopidogrel Tablet 75 milliGRAM(s) Oral daily  dextrose 5%. 1000 milliLiter(s) (100 mL/Hr) IV Continuous <Continuous>  dextrose 5%. 1000 milliLiter(s) (50 mL/Hr) IV Continuous <Continuous>  dextrose 50% Injectable 25 Gram(s) IV Push once  dextrose 50% Injectable 12.5 Gram(s) IV Push once  dextrose 50% Injectable 25 Gram(s) IV Push once  glucagon  Injectable 1 milliGRAM(s) IntraMuscular once  heparin   Injectable 5000 Unit(s) SubCutaneous every 12 hours  insulin glargine Injectable (LANTUS) 30 Unit(s) SubCutaneous at bedtime  insulin lispro (ADMELOG) corrective regimen sliding scale   SubCutaneous three times a day before meals  metoprolol succinate ER 25 milliGRAM(s) Oral daily  pantoprazole    Tablet 40 milliGRAM(s) Oral before breakfast    MEDICATIONS  (PRN):  dextrose Oral Gel 15 Gram(s) Oral once PRN Blood Glucose LESS THAN 70 milliGRAM(s)/deciliter      Care Discussed with Consultants/Other Providers [ ] YES  [ ] NO Patient is a 52y old  Female who presents with a chief complaint of Chest pain (19 Sep 2022 07:27)      INTERVAL HPI/OVERNIGHT EVENTS: no CP  T(C): 36.3 (09-19-22 @ 21:31), Max: 37.1 (09-19-22 @ 10:03)  HR: 67 (09-19-22 @ 21:31) (67 - 88)  BP: 117/77 (09-19-22 @ 21:31) (109/68 - 144/76)  RR: 16 (09-19-22 @ 21:31) (16 - 16)  SpO2: 95% (09-19-22 @ 21:31) (95% - 100%)  Wt(kg): --  I&O's Summary    18 Sep 2022 07:01  -  19 Sep 2022 07:00  --------------------------------------------------------  IN: 1300 mL / OUT: 0 mL / NET: 1300 mL    19 Sep 2022 07:01  -  19 Sep 2022 22:06  --------------------------------------------------------  IN: 770 mL / OUT: 0 mL / NET: 770 mL        PAST MEDICAL & SURGICAL HISTORY:  Diabetes  Type 2      HTN (hypertension)      Acute myocardial infarction  10/11/2021      CAD (coronary artery disease)      Obesity, Class I, BMI 30-34.9      Varicose veins of both lower extremities      History of UTI  12/2021      Leiomyoma of uterus      Anemia  due to heavy vaginal bleeding in the setting of anticoagulation s/p DESx1 on 10/12/22, Pt. received blood transfusions on 11/24/21 and 12/27/21      S/P cholecystectomy  2009      H/O tubal ligation  2009      Stented coronary artery  DESx1 to LAD, 10/12/2021          SOCIAL HISTORY  Alcohol:  Tobacco:  Illicit substance use:    FAMILY HISTORY:    REVIEW OF SYSTEMS:  CONSTITUTIONAL: No fever, weight loss, or fatigue  EYES: No eye pain, visual disturbances, or discharge  ENMT:  No difficulty hearing, tinnitus, vertigo; No sinus or throat pain  NECK: No pain or stiffness  RESPIRATORY: No cough, wheezing, chills or hemoptysis; No shortness of breath  CARDIOVASCULAR: No chest pain, palpitations, dizziness, or leg swelling  GASTROINTESTINAL: No abdominal or epigastric pain. No nausea, vomiting, or hematemesis; No diarrhea or constipation. No melena or hematochezia.  GENITOURINARY: No dysuria, frequency, hematuria, or incontinence  NEUROLOGICAL: No headaches, memory loss, loss of strength, numbness, or tremors  SKIN: No itching, burning, rashes, or lesions   LYMPH NODES: No enlarged glands  ENDOCRINE: No heat or cold intolerance; No hair loss  MUSCULOSKELETAL: No joint pain or swelling; No muscle, back, or extremity pain  PSYCHIATRIC: No depression, anxiety, mood swings, or difficulty sleeping  HEME/LYMPH: No easy bruising, or bleeding gums  ALLERY AND IMMUNOLOGIC: No hives or eczema    RADIOLOGY & ADDITIONAL TESTS:    Imaging Personally Reviewed:  [ ] YES  [ ] NO    Consultant(s) Notes Reviewed:  [ ] YES  [ ] NO    PHYSICAL EXAM:  GENERAL: NAD, well-groomed, well-developed  HEAD:  Atraumatic, Normocephalic  EYES: EOMI, PERRLA, conjunctiva and sclera clear  ENMT: No tonsillar erythema, exudates, or enlargement; Moist mucous membranes, Good dentition, No lesions  NECK: Supple, No JVD, Normal thyroid  NERVOUS SYSTEM:  Alert & Oriented X3, Good concentration; Motor Strength 5/5 B/L upper and lower extremities; DTRs 2+ intact and symmetric  CHEST/LUNG: Clear to percussion bilaterally; No rales, rhonchi, wheezing, or rubs  HEART: Regular rate and rhythm; No murmurs, rubs, or gallops  ABDOMEN: Soft, Nontender, Nondistended; Bowel sounds present  EXTREMITIES:  2+ Peripheral Pulses, No clubbing, cyanosis, or edema  LYMPH: No lymphadenopathy noted  SKIN: No rashes or lesions    LABS:                        10.6   6.07  )-----------( 276      ( 18 Sep 2022 07:13 )             37.4     09-18    137  |  104  |  15  ----------------------------<  129<H>  4.2   |  25  |  0.59    Ca    9.3      18 Sep 2022 07:13    TPro  7.5  /  Alb  4.3  /  TBili  0.4  /  DBili  x   /  AST  15  /  ALT  12  /  AlkPhos  118  09-18        CAPILLARY BLOOD GLUCOSE      POCT Blood Glucose.: 144 mg/dL (19 Sep 2022 21:06)  POCT Blood Glucose.: 90 mg/dL (19 Sep 2022 17:20)  POCT Blood Glucose.: 173 mg/dL (19 Sep 2022 12:18)  POCT Blood Glucose.: 153 mg/dL (19 Sep 2022 09:20)            MEDICATIONS  (STANDING):  aspirin enteric coated 81 milliGRAM(s) Oral daily  atorvastatin 80 milliGRAM(s) Oral at bedtime  clopidogrel Tablet 75 milliGRAM(s) Oral daily  dextrose 5%. 1000 milliLiter(s) (100 mL/Hr) IV Continuous <Continuous>  dextrose 5%. 1000 milliLiter(s) (50 mL/Hr) IV Continuous <Continuous>  dextrose 50% Injectable 25 Gram(s) IV Push once  dextrose 50% Injectable 12.5 Gram(s) IV Push once  dextrose 50% Injectable 25 Gram(s) IV Push once  glucagon  Injectable 1 milliGRAM(s) IntraMuscular once  heparin   Injectable 5000 Unit(s) SubCutaneous every 12 hours  insulin glargine Injectable (LANTUS) 30 Unit(s) SubCutaneous at bedtime  insulin lispro (ADMELOG) corrective regimen sliding scale   SubCutaneous three times a day before meals  metoprolol succinate ER 25 milliGRAM(s) Oral daily  pantoprazole    Tablet 40 milliGRAM(s) Oral before breakfast    MEDICATIONS  (PRN):  dextrose Oral Gel 15 Gram(s) Oral once PRN Blood Glucose LESS THAN 70 milliGRAM(s)/deciliter      Care Discussed with Consultants/Other Providers [ ] YES  [ ] NO

## 2022-09-19 NOTE — PROGRESS NOTE ADULT - SUBJECTIVE AND OBJECTIVE BOX
History of present illness  52yoF presenting to the ED with CP. PMH of CAD s/p mLAD MONI (10/12/2021) for NSTEMI on ASA ad Plavix with reported compliance.     Patient reports 1xwk hx of CP, substernal, burning with rads to the throat that occurs at rest and lasts typically ~20min. She was concerned that this was reflux disease and was evaluated at her PCP who performed an ECG with TWI in leads V2-4. The pcp did not have access to previous ECG's and told her to present to the ED for evaluation. The patient states that this CP is similar in character to her NSTEMI but a little different.     Review of old ECG's shows similar TWI in versions noted in:  12/21, 11/21, and 10/21. Troponin in the ED <6. CP free at this time.     At the time of examination the patient reports that she is clinically doing well.  Since she has been in the hospital she reports that her midsternal nondraining chest discomfort has not occurred.  Denies any chest pain/tightness/discomfort, fevers, chills, sweats, and sensation, dizzy or palpitations.  No abdominal pain is noted.    Past medical history/surgical history  Coronary artery disease status post drug-eluting stent on 10/2021  Hypertension  Dyslipidemia  Diabetes mellitus  Obesity  Varicose veins  History of urinary tract infection  Leiomyoma of uterus  Anemia  Cholecystectomy  Tubal ligation    Social history  No alcohol use, tobacco or illicit drug use    Family history noncontributory for premature coronary disease or sudden cardiac death    Review of systems  14 point review of systems otherwise unremarkable except what described above in history of present illness    Physical examination  No apparent distress, alert and oriented x3, appropriate affect  JVD is not elevated, supple, no lymphadenopathy  Clear to auscultation laterally with no wheezing rhonchi crackles  Regular rate and rhythm with no murmur rub or gallop line positive bowel sound, soft, nontender, nondistended, no masses guarding rebound tender no clubbing tenderness or edema moving all extremities spontaneously 2+ DP/PT pulse  No focal deficits    Assessment/plan  52-year-old female with a past medical history significant for    Coronary artery disease status post drug-eluting stent on 10/2021  Hypertension  Dyslipidemia  Diabetes mellitus  Obesity  Varicose veins  History of urinary tract infection  Leiomyoma of uterus  Anemia  Cholecystectomy  Tubal ligation    Who presents with 1 week history of constant midsternal nonradiating chest discomfort.  The patient reports that after arriving to the emergency department and given a GI cocktail her symptoms have resolved.  Troponin has been unremarkable in nature.    --The patient's chest discomfort is atypical in nature.  It does not appear to be cardiac in nature.  Would recommend that other etiologies of chest discomfort be performed.  --Plan for patient to get a TTE later today.--Continue aspirin 81 mg daily and clopidogrel 75 mg daily.  Signs and symptoms of bleeding were reviewed with the patient.  She was told to avoid all NSAIDs.  May take Tylenol for discomfort.  --Continue metoprolol succinate 25 mg daily.  Uptitrate as tolerated.  --Continue atorvastatin.  --As an outpatient she was also on Ozempic and Jardiance 10 mg daily.  --Continue pantoprazole 40 mg daily.  Consider starting the patient also on an H2 blocker if she has recurrent symptoms that may be suggestive of reflux.  --Patient is undergoing an ischemic work-up.  --Continue telemetry monitoring.    All question concerns of the patient were addressed.    Findings and plan were discussed with cardiology team.

## 2022-09-19 NOTE — PROGRESS NOTE ADULT - ASSESSMENT
Mrs. Phoenix is a 52yoF with CAD s/p MONI in 10/21 presenting to the ED with a 1week hx of CP at rest.     #Chest pain   PMH significant for CAD s/p MONI to mLAD in 10/21 2/2 NSTEMI.   Atypical chest pain with EKG similar to prior. At this time her troponin negative (<6).  Given the evidence discussed above I do not believe this represents ischemic chest pain despite her hx.   -Continue Home ASA, Plavix, Metoprolol, and Atorvastatin   -Investigate non-cardiac causes of CP   -Tele  -TTE  -Stress test pending.   -Do not need to continue trending troponin (CP free, Symptoms x1wk with negative enzyme on presentation, ECG at baseline)      Taz Cole, Cardiology Fellow, F-1    For all New Consults and Questions:  [email or web address]   Login: [email or web address]  Note incomplete until attending Co-sign.      Mrs. Phoenix is a 52yoF with CAD s/p MONI in 10/21 presenting to the ED with a 1week hx of CP at rest.     #Chest pain   PMH significant for CAD s/p MONI to mLAD in 10/21 2/2 NSTEMI.   Atypical chest pain with EKG similar to prior. At this time her troponin negative (<6).  Given the evidence discussed above I do not believe this represents ischemic chest pain despite her hx.   -Continue Home ASA, Plavix, Metoprolol, and Atorvastatin   -Investigate non-cardiac causes of CP   -Tele  -TTE  -Stress test pending, please order nuclear stress instead of stress echo  -Do not need to continue trending troponin (CP free, Symptoms x1wk with negative enzyme on presentation, ECG at baseline)      Taz Cole, Cardiology Fellow, F-1    For all New Consults and Questions:  [email or web address]   Login: [email or web address]  Note incomplete until attending Co-sign.

## 2022-09-19 NOTE — PROGRESS NOTE ADULT - ATTENDING COMMENTS
52 year old woman with prior coronary stent admitted with intermittent chest pain (non-exertional and not relieved by rest) intermittently for a week.  Symptoms appear to have resolved with the initiation of PPI. Troponin < 6 x 2. TWI are old.  Findings not suggestive of chest pain secondary to epicardial coronary artery disease.  Ms. Phoenix did however express that she would like to consider further evaluation with a stress test and seeking to schedule.    To contact call Cardiology Fellow or Attending as listed on [email or web address] password: [email or web address].

## 2022-09-20 ENCOUNTER — TRANSCRIPTION ENCOUNTER (OUTPATIENT)
Age: 52
End: 2022-09-20

## 2022-09-20 VITALS
SYSTOLIC BLOOD PRESSURE: 116 MMHG | RESPIRATION RATE: 17 BRPM | HEART RATE: 78 BPM | OXYGEN SATURATION: 100 % | DIASTOLIC BLOOD PRESSURE: 77 MMHG | TEMPERATURE: 98 F

## 2022-09-20 LAB
GLUCOSE BLDC GLUCOMTR-MCNC: 128 MG/DL — HIGH (ref 70–99)
GLUCOSE BLDC GLUCOMTR-MCNC: 143 MG/DL — HIGH (ref 70–99)

## 2022-09-20 PROCEDURE — 71045 X-RAY EXAM CHEST 1 VIEW: CPT

## 2022-09-20 PROCEDURE — 85730 THROMBOPLASTIN TIME PARTIAL: CPT

## 2022-09-20 PROCEDURE — 93306 TTE W/DOPPLER COMPLETE: CPT

## 2022-09-20 PROCEDURE — 93016 CV STRESS TEST SUPVJ ONLY: CPT

## 2022-09-20 PROCEDURE — 93005 ELECTROCARDIOGRAM TRACING: CPT

## 2022-09-20 PROCEDURE — 83880 ASSAY OF NATRIURETIC PEPTIDE: CPT

## 2022-09-20 PROCEDURE — 78452 HT MUSCLE IMAGE SPECT MULT: CPT

## 2022-09-20 PROCEDURE — 78452 HT MUSCLE IMAGE SPECT MULT: CPT | Mod: 26

## 2022-09-20 PROCEDURE — A9500: CPT

## 2022-09-20 PROCEDURE — 85027 COMPLETE CBC AUTOMATED: CPT

## 2022-09-20 PROCEDURE — 99232 SBSQ HOSP IP/OBS MODERATE 35: CPT

## 2022-09-20 PROCEDURE — 84484 ASSAY OF TROPONIN QUANT: CPT

## 2022-09-20 PROCEDURE — 80053 COMPREHEN METABOLIC PANEL: CPT

## 2022-09-20 PROCEDURE — U0005: CPT

## 2022-09-20 PROCEDURE — 83036 HEMOGLOBIN GLYCOSYLATED A1C: CPT

## 2022-09-20 PROCEDURE — 82962 GLUCOSE BLOOD TEST: CPT

## 2022-09-20 PROCEDURE — 93018 CV STRESS TEST I&R ONLY: CPT

## 2022-09-20 PROCEDURE — U0003: CPT

## 2022-09-20 PROCEDURE — 93017 CV STRESS TEST TRACING ONLY: CPT

## 2022-09-20 PROCEDURE — 99285 EMERGENCY DEPT VISIT HI MDM: CPT

## 2022-09-20 PROCEDURE — 84702 CHORIONIC GONADOTROPIN TEST: CPT

## 2022-09-20 PROCEDURE — 85610 PROTHROMBIN TIME: CPT

## 2022-09-20 PROCEDURE — 85025 COMPLETE CBC W/AUTO DIFF WBC: CPT

## 2022-09-20 RX ORDER — METFORMIN HYDROCHLORIDE 850 MG/1
1 TABLET ORAL
Qty: 0 | Refills: 0 | DISCHARGE

## 2022-09-20 RX ORDER — NORETHINDRONE 0.35 MG/1
1 TABLET ORAL
Qty: 0 | Refills: 0 | DISCHARGE

## 2022-09-20 RX ADMIN — CLOPIDOGREL BISULFATE 75 MILLIGRAM(S): 75 TABLET, FILM COATED ORAL at 12:35

## 2022-09-20 RX ADMIN — Medication 81 MILLIGRAM(S): at 12:35

## 2022-09-20 RX ADMIN — PANTOPRAZOLE SODIUM 40 MILLIGRAM(S): 20 TABLET, DELAYED RELEASE ORAL at 05:25

## 2022-09-20 RX ADMIN — Medication 25 MILLIGRAM(S): at 05:26

## 2022-09-20 RX ADMIN — HEPARIN SODIUM 5000 UNIT(S): 5000 INJECTION INTRAVENOUS; SUBCUTANEOUS at 05:26

## 2022-09-20 NOTE — DISCHARGE NOTE PROVIDER - CARE PROVIDER_API CALL
Aidan [place]to)  [place] [address]  Phone: [phone]  Fax: [phone]  Follow Up Time: 2 weeks

## 2022-09-20 NOTE — PROGRESS NOTE ADULT - ASSESSMENT
Mrs. Phoenix is a 52yoF with CAD s/p MONI in 10/21 presenting to the ED with a 1week hx of CP at rest.     #Chest pain   PMH significant for CAD s/p MONI to mLAD in 10/21 2/2 NSTEMI.   Atypical chest pain with EKG similar to prior. At this time her troponin negative (<6).  Given the evidence discussed above I do not believe this represents ischemic chest pain despite her hx.   -Continue Home ASA, Plavix, Metoprolol, and Atorvastatin   -Investigate non-cardiac causes of CP   -Tele  -TTE  -Stress test pending  -Do not need to continue trending troponin (CP free, Symptoms x1wk with negative enzyme on presentation, ECG at baseline)      Taz Cole, Cardiology Fellow, F-1    For all New Consults and Questions:  [email or web address]   Login: [email or web address]  Note incomplete until attending Co-sign.

## 2022-09-20 NOTE — PROGRESS NOTE ADULT - SUBJECTIVE AND OBJECTIVE BOX
Subjective  No acute events reported overnight.  The patient denies any chest pain/tightness discomfort, fevers, chills, sweats, lightheaded sensation, dizziness or palpitations.    Review of systems   14 point review of systems otherwise unremarkable separate described above in history of present illness    Physical examination  No apparent distress, alert and oriented x3, appropriate affect  JVD is not elevated, supple, no lymphadenopathy  Clear to auscultation laterally with no wheezing rhonchi crackles  Regular rate and rhythm with no murmur rub or gallop line positive bowel sound, soft, nontender, nondistended, no masses guarding rebound tender no clubbing tenderness or edema moving all extremities spontaneously 2+ DP/PT pulse  No focal deficits    Assessment/plan  52-year-old female with a past medical history significant for    Coronary artery disease status post drug-eluting stent on 10/2021  Hypertension  Dyslipidemia  Diabetes mellitus  Obesity  Varicose veins  History of urinary tract infection  Leiomyoma of uterus  Anemia  Cholecystectomy  Tubal ligation    Who presents with 1 week history of constant midsternal nonradiating chest discomfort.  The patient reports that after arriving to the emergency department and given a GI cocktail her symptoms have resolved.  Troponin has been unremarkable in nature.    -- The patient's chest discomfort is atypical in nature.  It does not appear to be cardiac in nature.  Would recommend that other etiologies of chest discomfort be performed.  -- TTE results from 9/9/2022 reviewed with the patient.  TTE demonstrated mild mitral valve regurgitation, normal trileaflet aortic valve with no aortic valve regurgitation.  No segmental wall motion abnormalities with normal left ventricular systolic function.  Normal diastolic function.  Normal right ventricular size and function.  --Continue aspirin 81 mg daily and clopidogrel 75 mg daily.  Signs and symptoms of bleeding were reviewed with the patient.  She was told to avoid all NSAIDs.  May take Tylenol for discomfort.  --Continue metoprolol succinate 25 mg daily.  --Continue atorvastatin.  --Continue pantoprazole 40 mg daily.  Consider starting the patient also on an H2 blocker if she has recurrent symptoms that may be suggestive of reflux.  --Patient is undergoing an ischemic work-up.  -- Continue telemetry monitoring.    All question concerns of the patient were addressed.    Findings and plan were discussed with cardiology team.

## 2022-09-20 NOTE — DISCHARGE NOTE PROVIDER - NSDCCPCAREPLAN_GEN_ALL_CORE_FT
PRINCIPAL DISCHARGE DIAGNOSIS  Diagnosis: Atypical chest pain  Assessment and Plan of Treatment: Please follow up with your primary care physician regarding your hospitalization and take all medications prescribed.      SECONDARY DISCHARGE DIAGNOSES  Diagnosis: DM (diabetes mellitus)  Assessment and Plan of Treatment: Please follow up with your primary care physician regarding your hospitalization and take all medications prescribed.    Diagnosis: HTN (hypertension)  Assessment and Plan of Treatment: Please follow up with your primary care physician regarding your hospitalization and take all medications prescribed.

## 2022-09-20 NOTE — DISCHARGE NOTE NURSING/CASE MANAGEMENT/SOCIAL WORK - NSDCPEFALRISK_GEN_ALL_CORE
For information on Fall & Injury Prevention, visit: [email or web address] OR  [email or web address] OR  https://www.cdc.gov/steadi/patient.html

## 2022-09-20 NOTE — DISCHARGE NOTE PROVIDER - NSDCFUSCHEDAPPT_GEN_ALL_CORE_FT
Valente James  [place] [facility], [facility]  INTERVEN [address]  Scheduled Appointment: 09/21/2022    Dmitriy Ugalde  [place] [facility]  CARDIOLOGY [address]  Scheduled Appointment: 11/17/2022

## 2022-09-20 NOTE — DISCHARGE NOTE PROVIDER - NSDCMRMEDTOKEN_GEN_ALL_CORE_FT
aspirin 81 mg oral tablet, chewable: 1 tab(s) orally once a day  atorvastatin 80 mg oral tablet: 1 tab(s) orally once a day (at bedtime)  Basaglar KwikPen 100 units/mL subcutaneous solution: 30 unit(s) subcutaneous once a day (at bedtime) MDD:1   clopidogrel 75 mg oral tablet: 1 tab(s) orally once a day  Jardiance 10 mg oral tablet: 1 tab(s) orally once a day   metFORMIN 1000 mg oral tablet: 1 tab(s) orally 2 times a day starting on 9/22/22  metoprolol succinate 25 mg oral tablet, extended release: 1 tab(s) orally once a day  Ozempic 2 mg/1.5 mL (0.25 mg or 0.5 mg dose) subcutaneous solution: 0.5 milligram(s) subcutaneously once a week   pantoprazole 40 mg oral delayed release tablet: 1 tab(s) orally once a day (before a meal)  senna oral tablet: 2 tab(s) orally once a day (at bedtime)

## 2022-09-20 NOTE — DISCHARGE NOTE PROVIDER - HOSPITAL COURSE
52-year-old female with a past medical history significant for    Coronary artery disease status post drug-eluting stent on 10/2021  Hypertension  Dyslipidemia  Diabetes mellitus  Obesity  Varicose veins  History of urinary tract infection  Leiomyoma of uterus  Anemia  Cholecystectomy  Tubal ligation    Who presents with 1 week history of constant midsternal nonradiating chest discomfort.  The patient reports that after arriving to the emergency department and given a GI cocktail her symptoms have resolved.  Troponin has been unremarkable in nature.    -- The patient's chest discomfort is atypical in nature.  It does not appear to be cardiac in nature.  Would recommend that other etiologies of chest discomfort be performed.  -- TTE results from 9/9/2022 reviewed with the patient.  TTE demonstrated mild mitral valve regurgitation, normal trileaflet aortic valve with no aortic valve regurgitation.  No segmental wall motion abnormalities with normal left ventricular systolic function.  Normal diastolic function.  Normal right ventricular size and function.  --Continue aspirin 81 mg daily and clopidogrel 75 mg daily.  Signs and symptoms of bleeding were reviewed with the patient.  She was told to avoid all NSAIDs.  May take Tylenol for discomfort.  --Continue metoprolol succinate 25 mg daily.  --Continue atorvastatin.  --Continue pantoprazole 40 mg daily.  Consider starting the patient also on an H2 blocker if she has recurrent symptoms that may be suggestive of reflux.  --Patient is undergoing an ischemic work-up.  - Echo and pharmacologic stress test were negative for ischemia.  As per attending, patient stable for discharge.

## 2022-09-20 NOTE — PROGRESS NOTE ADULT - SUBJECTIVE AND OBJECTIVE BOX
Patient seen and examined at bedside.    Overnight Events: No acute events overnight. Denies chest pain or SOB      REVIEW OF SYSTEMS:  Constitutional:     [ x] negative [ ] fevers [ ] chills [ ] weight loss [ ] weight gain  HEENT:                  [ x] negative [ ] dry eyes [ ] eye irritation [ ] postnasal drip [ ] nasal congestion  CV:                         [x ] negative  [ ] chest pain [ ] orthopnea [ ] palpitations [ ] murmur  Resp:                     [ x] negative [ ] cough [ ] shortness of breath [ ] dyspnea [ ] wheezing [ ] sputum [ ]hemoptysis  GI:                          [ x] negative [ ] nausea [ ] vomiting [ ] diarrhea [ ] constipation [ ] abd pain [ ] dysphagia   :                        [ x] negative [ ] dysuria [ ] nocturia [ ] hematuria [ ] increased urinary frequency  Musculoskeletal: [ x] negative [ ] back pain [ ] myalgias [ ] arthralgias [ ] fracture  Skin:                       [ x] negative [ ] rash [ ] itch  Neurological:        [ x] negative [ ] headache [ ] dizziness [ ] syncope [ ] weakness [ ] numbness  Psychiatric:           [ x] negative [ ] anxiety [ ] depression  Endocrine:            [ x] negative [ ] diabetes [ ] thyroid problem  Heme/Lymph:      [ x] negative [ ] anemia [ ] bleeding problem  Allergic/Immune: [x ] negative [ ] itchy eyes [ ] nasal discharge [ ] hives [ ] angioedema    [x ] All other systems negative  [ ] Unable to assess ROS due to    Current Meds:  aspirin enteric coated 81 milliGRAM(s) Oral daily  atorvastatin 80 milliGRAM(s) Oral at bedtime  clopidogrel Tablet 75 milliGRAM(s) Oral daily  dextrose 5%. 1000 milliLiter(s) IV Continuous <Continuous>  dextrose 5%. 1000 milliLiter(s) IV Continuous <Continuous>  dextrose 50% Injectable 25 Gram(s) IV Push once  dextrose 50% Injectable 12.5 Gram(s) IV Push once  dextrose 50% Injectable 25 Gram(s) IV Push once  dextrose Oral Gel 15 Gram(s) Oral once PRN  glucagon  Injectable 1 milliGRAM(s) IntraMuscular once  heparin   Injectable 5000 Unit(s) SubCutaneous every 12 hours  insulin glargine Injectable (LANTUS) 30 Unit(s) SubCutaneous at bedtime  insulin lispro (ADMELOG) corrective regimen sliding scale   SubCutaneous three times a day before meals  metoprolol succinate ER 25 milliGRAM(s) Oral daily  pantoprazole    Tablet 40 milliGRAM(s) Oral before breakfast      PAST MEDICAL & SURGICAL HISTORY:  Diabetes  Type 2      HTN (hypertension)      Acute myocardial infarction  10/11/2021      CAD (coronary artery disease)      Obesity, Class I, BMI 30-34.9      Varicose veins of both lower extremities      History of UTI  12/2021      Leiomyoma of uterus      Anemia  due to heavy vaginal bleeding in the setting of anticoagulation s/p DESx1 on 10/12/22, Pt. received blood transfusions on 11/24/21 and 12/27/21      S/P cholecystectomy  2009      H/O tubal ligation  2009      Stented coronary artery  DESx1 to LAD, 10/12/2021          Vitals:  T(F): 98.4 (09-20), Max: 98.7 (09-19)  HR: 80 (09-20) (67 - 88)  BP: 115/72 (09-20) (111/74 - 144/76)  RR: 16 (09-20)  SpO2: 99% (09-20)  I&O's Summary    19 Sep 2022 07:01  -  20 Sep 2022 07:00  --------------------------------------------------------  IN: 820 mL / OUT: 0 mL / NET: 820 mL        Physical Exam:  GENERAL: No acute distress, well-developed  HEAD:  Atraumatic, Normocephalic  ENT: EOMI, PERRLA, conjunctiva and sclera clear, Neck supple, No JVD, moist mucosa  CHEST/LUNG: Clear to auscultation bilaterally; No wheeze, equal breath sounds bilaterally   BACK: No spinal tenderness  HEART: Regular rate and rhythm; No murmurs, rubs, or gallops  ABDOMEN: Soft, Nontender, Nondistended; Bowel sounds present  EXTREMITIES:  No clubbing, cyanosis, or edema  PSYCH: Nl behavior, nl affect  NEUROLOGY: AAOx3, non-focal, cranial nerves intact  SKIN: Normal color, No rashes or lesions                    Serum Pro-Brain Natriuretic Peptide: 56 pg/mL (09-17 @ 11:47)

## 2022-09-20 NOTE — DISCHARGE NOTE NURSING/CASE MANAGEMENT/SOCIAL WORK - PATIENT PORTAL LINK FT
You can access the FollowMyHealth Patient Portal offered by [facility] by registering at the following website: [email or web address]. By joining [facility]’s FollowMyHealth portal, you will also be able to view your health information using other applications (apps) compatible with our system.

## 2022-09-21 ENCOUNTER — APPOINTMENT (OUTPATIENT)
Dept: INTERVENTIONAL RADIOLOGY/VASCULAR | Facility: CLINIC | Age: 52
End: 2022-09-21
Payer: COMMERCIAL

## 2022-09-21 ENCOUNTER — RX RENEWAL (OUTPATIENT)
Age: 52
End: 2022-09-21

## 2022-09-21 DIAGNOSIS — R93.5 ABNORMAL FINDINGS ON DIAGNOSTIC IMAGING OF OTHER ABDOMINAL REGIONS, INCLUDING RETROPERITONEUM: ICD-10-CM

## 2022-09-21 DIAGNOSIS — D25.9 LEIOMYOMA OF UTERUS, UNSPECIFIED: ICD-10-CM

## 2022-09-21 PROCEDURE — XXXXX: CPT | Mod: 1L

## 2022-11-03 NOTE — ED PROVIDER NOTE - ATTENDING CONTRIBUTION TO CARE
Attending Quang: I performed a history and physical exam of the patient and discussed their management with the resident/fellow/ACP/student. I have reviewed the resident/fellow/ACP/student note and agree with the documented findings and plan of care, except as noted. My medical decision making and observations are found above. Please refer to any progress notes for updates on clinical course. Burow's Advancement Flap Text: The defect edges were debeveled with a #15 scalpel blade.  Given the location of the defect and the proximity to free margins a Burow's advancement flap was deemed most appropriate.  Using a sterile surgical marker, the appropriate advancement flap was drawn incorporating the defect and placing the expected incisions within the relaxed skin tension lines where possible.    The area thus outlined was incised deep to adipose tissue with a #15 scalpel blade.  The skin margins were undermined to an appropriate distance in all directions utilizing iris scissors.

## 2022-11-04 ENCOUNTER — RX RENEWAL (OUTPATIENT)
Age: 52
End: 2022-11-04

## 2022-11-07 ENCOUNTER — RX RENEWAL (OUTPATIENT)
Age: 52
End: 2022-11-07

## 2022-11-10 ENCOUNTER — RX RENEWAL (OUTPATIENT)
Age: 52
End: 2022-11-10

## 2022-11-17 ENCOUNTER — APPOINTMENT (OUTPATIENT)
Dept: CARDIOLOGY | Facility: CLINIC | Age: 52
End: 2022-11-17

## 2022-11-17 VITALS — SYSTOLIC BLOOD PRESSURE: 109 MMHG | HEART RATE: 78 BPM | OXYGEN SATURATION: 100 % | DIASTOLIC BLOOD PRESSURE: 75 MMHG

## 2022-11-17 PROCEDURE — 93000 ELECTROCARDIOGRAM COMPLETE: CPT

## 2022-11-17 PROCEDURE — 99214 OFFICE O/P EST MOD 30 MIN: CPT

## 2022-11-18 NOTE — REASON FOR VISIT
[Structural Heart and Valve Disease] : structural heart and valve disease [Hyperlipidemia] : hyperlipidemia [Hypertension] : hypertension [FreeTextEntry1] : 52-year-old female with a past medical history significant for\par \par Coronary artery disease s/p NSTEMI/PCI (mLAD) - 10/11/2021\par Type 2 diabetes mellitus (hemoglobin A1c 11.4%, Metformin)\par Varicose veins of bilateral lower extremities\par Cholecystectomy\par Stab phlebectomy\par Tubal ligation bilaterally\par Pre-menopause\par \par Since she was in the hospital she is doing better.  Has had initially a few episodes when she felt that something was sticking her that occurred a few days after the procedure.  When she ambulates no chest pain/tightness/discomfort, light-headed sensation, dizziness or palpations.  No blood in her stool or urine.  No fevers, chills or sweats.  Prior to the episodes she had severe anxiety with chest pain that has now resolved when she gets anxious.  Ms. Phoenix ambulates 2.5 miles in the morning.  Notes significant changes in her diet.  Has a decrease in appetite and has cut portion sizes.  Eating more vegetables.  Cutting out fried foods.   \par ================\par 1/13/2022\par Twice the patient was in the ER for severe blood loss for which she was anemic.  She was last in the ER the day after [place].  The patient has large fibroids.  She is getting injections by her gynecologist.  The patient was transitioned from Brillinta to clopidogrel 75mg daily.  Ms. Phoenix had her blood work checked on 12/27/2021.\par ==============\par 2/24/2022\par The patient reports that she feels okay, sometimes.  Suffers from anxiety.  No chest pain/tightness/discomfort, shortness of breath, lower extremity swelling, abdominal pain, fevers or chills.  The patient is able to walk unlimited distances.  The patient has not had any bleeding issues for over a month.  When she develops severe bleeding feels light-headed and dizzy.  No current formal exercise.  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    \par \par =============\par 5/12/2022\par The patient sometimes experiences a sharp pain in the left side of her chest.  It can occur at rest or upon exertion.  It last for a few seconds.  No provoking events.  No shortness of breath, lower extremity swelling, PND or change in orthopnea (1-2 pillows).  No fevers, chills or sweats.  The patient was recently seen by IR for following for her symptomatic uterine fibroids.  She is status post UFE (incl right ovarian A that supplied ovarian fibroid) on 3/17/22.  Ms Phoenix reports feeling well overall & states that she had menses during UFE which stopped 4/11/22 - states menstrual flow was not heavy - utilized light pads. She denies fever, chills, pelvic pain, dysuria, n/v/d, no access site issues.\par \par ==================\par 8/25/2022\par The patient is doing well.  No cardiopulmonary complaints at rest or upon exertion.  Walking unlimited distances without any issues.  No formal exercise program.   No shortness of breath, lower extremity swelling, PND or change in orthopnea (1-2 pillows).  No fevers, chills or sweats. No blood in her stool or urine.  No fevers, chills or sweats. \par \par ====================\par 11/17/2022\par Doing much better since she left the hospital.  The patient can walk unlimited distance.  No cardiopulmonary complaints at rest or upon exertion.  No formal exercise program.   No shortness of breath, lower extremity swelling, PND or change in orthopnea (1-2 pillows).  No blood in her stool or urine.  No fevers, chills or sweats.  She is not eating meat.  Following a heart healthy diet.\par \par Gynecologist - Dr. Delarosa - [phone]\par \par Assessment/Plan\par The patient is a PCA at [employer], formerly [employer] she was working when she started to develop chest pain that was midsternal 10 out of 10.  She presented on October 11, 2021 with a NSTEMI.  She underwent cardiac catheterization and found to have severe obstructive disease of the left anterior descending artery.  Middle left anterior descending artery drug-eluting stent was inserted.  The patient tolerated the procedure well.  No discomfort in her wrist.\par \par Overall she is now doing well with no cardiopulmonary complaints at rest or upon exertion.  Taking all her medications as prescribed.\par \par --TTE on 9/19/2022 demonstrated normal mitral valve with mild mitral valve regurgitation.  No aortic valve regurgitation.  Normal left ventricular systolic function.  No segmental wall motion abnormalities.  Normal diastolic function.  Normal right ventricular size and function.  Findings were discussed with the patient\par --Stress test on 9/20/2022 demonstrate exercise capacity of 8 METS.  Appropriate heart rate/blood pressure response.  1 mm ST downsloping in leads V3/V4/V5/V6.  There are small, moderate defect in the anterior apical, distal anterior and distal inferior lateral walls that are fixed, suggestive of breast attenuation artifact.  The observed defect no longer significant with prone imaging.  Normal wall motion with post-rest EF 62%.  Findings were discussed with the patient.\par --Recent laboratory work was reviewed.  Asked patient to please have blood work sent to me for review.  She is look for an internist.  \par --Previously reviewed catheterization with patient that demonstrated severe 1 CAD s/p PCI of LAD (October 11, 2021).  No other significant disease.\par --Reviewed TTE from 4/28/2022 with the patient.  Mild diastolic dysfunction.\par --The patient is status post UFE for secondary anemia hx requiring multiple transfusions 11 & 12/2021. \par --Continue on aspirin 81mg daily.  May discontinue clopidogrel 75mg daily.  Signs and symptoms of bleeding were gone over. \par --The patient was recently hospitalized with severe vaginal bleeding from anemia (2 hospitalizations - 2 days).  Each hospitalization she has been transfused 2U of PRBC.  She was treated with Aygestin 5mg x10 days \par --Reviewed TTE from 4/28/2022 with the patient.\par --Avoid all NSAIDs.  May take Tylenol for discomfort.  Pros/cons and risk/benefits of DAPT therapy was gone over.  \par --Reviewed laboratory work with the patient.\par --She underwent MRI on 1/4/2022 for better evaluation of endometrium per last US read showing bulky uterus with asymmetric thickening of endometrium infiltrating into anterior myometrium and to rule out neoplasm.  Results are pending.\par --Status post drug-eluting stent/Cy on October 12, 2021.  Radial approach.  LVEDP 12 mmHg.  No aortic valve stenosis.  Normal left main with right dominant system.\par --Labs have been ordered to see if CBC is stable.  Indications for studies were gone over.\par --Continue Ozempic, Jardiance and metformin.\par --Continue atorvastatin 80 mg daily.\par --Continue ferrous sulfate.\par --Recommend a repeat TTE in 6 months to 12 months.  Indications and details of study was gone over.\par --The patient is morbidity obese (BMI).  Discussed strategies to lose weight and short and long term complications.\par --EKG performed do to history of coronary artery disease, hyperlipidemia and hypertension.\par --It was reviewed with the patient the importance of following a heart healthy lifestyle. AHA/ACC guidelines stress the importance of lifestyle modifications to lower cardiovascular disease risk. This includes eating a heart-healthy diet (fresh fruits/vegetable, whole grains, limit intake of sweets, sugar-sweetened beverages, red meats). He was encouraged to perform regular aerobic exercises at least 30 minutes of moderate intensity exercise at least 4 times a week and to maintain a desirable body weight and avoid tobacco products or second hand exposure.\par \par All questions and concerns of the patient were addressed.  Follow-up 3-4 months.

## 2022-11-18 NOTE — PHYSICAL EXAM
[Well Developed] : well developed [Well Nourished] : well nourished [No Acute Distress] : no acute distress [Normal Conjunctiva] : normal conjunctiva [Normal Venous Pressure] : normal venous pressure [No Carotid Bruit] : no carotid bruit [Normal S1, S2] : normal S1, S2 [No Murmur] : no murmur [No Rub] : no rub [No Gallop] : no gallop [Clear Lung Fields] : clear lung fields [Good Air Entry] : good air entry [No Respiratory Distress] : no respiratory distress  [Soft] : abdomen soft [Non Tender] : non-tender [No Masses/organomegaly] : no masses/organomegaly [Normal Bowel Sounds] : normal bowel sounds [Normal Gait] : normal gait [No Edema] : no edema [No Cyanosis] : no cyanosis [No Clubbing] : no clubbing [No Varicosities] : no varicosities [No Rash] : no rash [No Skin Lesions] : no skin lesions [Moves all extremities] : moves all extremities [No Focal Deficits] : no focal deficits [Normal Speech] : normal speech [Alert and Oriented] : alert and oriented [Normal memory] : normal memory [de-identified] : Right radial - clean/dry/intact with no thrill

## 2023-02-06 ENCOUNTER — RX RENEWAL (OUTPATIENT)
Age: 53
End: 2023-02-06

## 2023-02-10 ENCOUNTER — NON-APPOINTMENT (OUTPATIENT)
Age: 53
End: 2023-02-10

## 2023-02-13 ENCOUNTER — APPOINTMENT (OUTPATIENT)
Dept: ORTHOPEDIC SURGERY | Facility: CLINIC | Age: 53
End: 2023-02-13

## 2023-03-06 NOTE — ED CDU PROVIDER INITIAL DAY NOTE - DATE/TIME 2
24-Nov-2021 21:30

## 2023-03-16 NOTE — DISCHARGE NOTE PROVIDER - NSDCCPTREATMENT_GEN_ALL_CORE_FT
[facility] Call Center    Phone Message    May a detailed message be left on voicemail: yes     Reason for Call: Other: . pt had to cancel her VV on 3/17 for retention- pt stated her falocn was removed a while ago with a successful TOV, pt is wondering if she needs to have another appt since she not longer has a falcon, please call Jena, thank you    Action Taken: Message routed to:  Adult Clinics: Urology p [phone]    Travel Screening: Not Applicable                                                                       PRINCIPAL PROCEDURE  Procedure: UFE (uterine fibroid embolization)  Findings and Treatment: Uterine Fibroid Embolization  You underwent a Uterine Fibroid Embolization in Interventional Radiology (IR) with Dr Bustillo on 3/17/22  You may experience Post Embolization Syndrome for the first 5-7 days which may include nausea, vomiting, severe crampy pelvic pain & a low grade fever (below 101).                                                                       There may be a small area of bruising around the right groin site - this is normal.                                  Passage of clot, debris or tissue through the vagina may occur for the first few weeks to months & your menstrual period may be irregular for the first 3 cycles - this is normal.    To minimize the risk of infection, avoid introducing anything into the vagina and do not engage in sexual intercourse, swimming, douching, tub bathing, and do not use tampons for the next 6 weeks.                                                                                                                                                                                                                 - Ibuprofen, an antacid (like Prevacid), & stool softeners (like Colace) MUST BE TAKEN AS PRESCRIBED.                       - A narcotic (like Percocet) may be taken AS NEEDED for moderate to severe pain.                                                                                                                                                                - Anti nauseant medication ( like Zofran) may be taken AS NEEDED for nausea or vomiting.  Follow Up Instructions:   -Please call your gynecologist to schedule a 2 week post op follow up visit.   -Please call the IR Office at [phone] to schedule a one month post op visit with your IR Doctor.   Should you have any questions or concerns regarding the above, please call the IR Nurse Practitioner at [phone] or [phone] Monday - Friday 8 am - 4 pm.

## 2023-05-04 ENCOUNTER — RX RENEWAL (OUTPATIENT)
Age: 53
End: 2023-05-04

## 2023-05-18 ENCOUNTER — NON-APPOINTMENT (OUTPATIENT)
Age: 53
End: 2023-05-18

## 2023-05-18 ENCOUNTER — APPOINTMENT (OUTPATIENT)
Dept: CARDIOLOGY | Facility: CLINIC | Age: 53
End: 2023-05-18
Payer: COMMERCIAL

## 2023-05-18 VITALS
WEIGHT: 215 LBS | OXYGEN SATURATION: 99 % | BODY MASS INDEX: 34.55 KG/M2 | HEIGHT: 66 IN | SYSTOLIC BLOOD PRESSURE: 117 MMHG | HEART RATE: 75 BPM | DIASTOLIC BLOOD PRESSURE: 75 MMHG

## 2023-05-18 DIAGNOSIS — E88.81 METABOLIC SYNDROME: ICD-10-CM

## 2023-05-18 DIAGNOSIS — E11.9 TYPE 2 DIABETES MELLITUS W/OUT COMPLICATIONS: ICD-10-CM

## 2023-05-18 PROCEDURE — 93000 ELECTROCARDIOGRAM COMPLETE: CPT

## 2023-05-18 PROCEDURE — 99214 OFFICE O/P EST MOD 30 MIN: CPT

## 2023-05-18 RX ORDER — CLOPIDOGREL BISULFATE 75 MG/1
75 TABLET, FILM COATED ORAL DAILY
Qty: 90 | Refills: 2 | Status: DISCONTINUED | COMMUNITY
Start: 2022-01-13 | End: 2023-05-18

## 2023-05-29 PROBLEM — E11.9 NON-INSULIN DEPENDENT TYPE 2 DIABETES MELLITUS: Status: ACTIVE | Noted: 2022-03-01

## 2023-05-29 PROBLEM — E88.81 METABOLIC SYNDROME: Status: ACTIVE | Noted: 2023-05-29

## 2023-05-29 NOTE — REASON FOR VISIT
[Structural Heart and Valve Disease] : structural heart and valve disease [Hyperlipidemia] : hyperlipidemia [Hypertension] : hypertension [FreeTextEntry1] : 52-year-old female with a past medical history significant for\par \par Coronary artery disease s/p NSTEMI/PCI (mLAD) - 10/11/2021\par Type 2 diabetes mellitus (hemoglobin A1c 11.4%, Metformin)\par Varicose veins of bilateral lower extremities\par Cholecystectomy\par Stab phlebectomy\par Tubal ligation bilaterally\par Pre-menopause\par \par Since she was in the hospital she is doing better.  Has had initially a few episodes when she felt that something was sticking her that occurred a few days after the procedure.  When she ambulates no chest pain/tightness/discomfort, light-headed sensation, dizziness or palpations.  No blood in her stool or urine.  No fevers, chills or sweats.  Prior to the episodes she had severe anxiety with chest pain that has now resolved when she gets anxious.  Ms. Phoenix ambulates 2.5 miles in the morning.  Notes significant changes in her diet.  Has a decrease in appetite and has cut portion sizes.  Eating more vegetables.  Cutting out fried foods.   \par ================\par 1/13/2022\par Twice the patient was in the ER for severe blood loss for which she was anemic.  She was last in the ER the day after [place].  The patient has large fibroids.  She is getting injections by her gynecologist.  The patient was transitioned from Brillinta to clopidogrel 75mg daily.  Ms. Phoenix had her blood work checked on 12/27/2021.\par ==============\par 2/24/2022\par The patient reports that she feels okay, sometimes.  Suffers from anxiety.  No chest pain/tightness/discomfort, shortness of breath, lower extremity swelling, abdominal pain, fevers or chills.  The patient is able to walk unlimited distances.  The patient has not had any bleeding issues for over a month.  When she develops severe bleeding feels light-headed and dizzy.  No current formal exercise.  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    \par \par =============\par 5/12/2022\par The patient sometimes experiences a sharp pain in the left side of her chest.  It can occur at rest or upon exertion.  It last for a few seconds.  No provoking events.  No shortness of breath, lower extremity swelling, PND or change in orthopnea (1-2 pillows).  No fevers, chills or sweats.  The patient was recently seen by IR for following for her symptomatic uterine fibroids.  She is status post UFE (incl right ovarian A that supplied ovarian fibroid) on 3/17/22.  Ms Phoenix reports feeling well overall & states that she had menses during UFE which stopped 4/11/22 - states menstrual flow was not heavy - utilized light pads. She denies fever, chills, pelvic pain, dysuria, n/v/d, no access site issues.\par \par ==================\par 8/25/2022\par The patient is doing well.  No cardiopulmonary complaints at rest or upon exertion.  Walking unlimited distances without any issues.  No formal exercise program.   No shortness of breath, lower extremity swelling, PND or change in orthopnea (1-2 pillows).  No fevers, chills or sweats. No blood in her stool or urine.  No fevers, chills or sweats. \par \par ====================\par 11/17/2022\par Doing much better since she left the hospital.  The patient can walk unlimited distance.  No cardiopulmonary complaints at rest or upon exertion.  No formal exercise program.   No shortness of breath, lower extremity swelling, PND or change in orthopnea (1-2 pillows).  No blood in her stool or urine.  No fevers, chills or sweats.  She is not eating meat.  Following a heart healthy diet.\par \par =======================\par 5/18/2023\par No cardiopulmonary complaints at rest or upon exertion.  Does not follow any formal exercise program.   No shortness of breath, lower extremity swelling, PND or change in orthopnea (1-2 pillows).  No blood in her stool or urine.  No fevers, chills or sweats.  The patient has not been exercising.  She stopped taking the clopidogrel.\par \par Gynecologist - Dr. 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  She presented on October 11, 2021 with a NSTEMI.  She underwent cardiac catheterization and found to have severe obstructive disease of the left anterior descending artery.  Middle left anterior descending artery drug-eluting stent was inserted.  The patient tolerated the procedure well.  No discomfort in her wrist.\par \par Overall she is now doing well with no cardiopulmonary complaints at rest or upon exertion.  Taking all her medications as prescribed.\par \par --Asked patient to please have blood work sent to me for review.  \par --TTE on 9/19/2022 demonstrated normal mitral valve with mild mitral valve regurgitation.  No aortic valve regurgitation.  Normal left ventricular systolic function.  No segmental wall motion abnormalities.  Normal diastolic function.  Normal right ventricular size and function.  Findings were discussed with the patient\par --Stress test on 9/20/2022 demonstrate exercise capacity of 8 METS.  Appropriate heart rate/blood pressure response.  1 mm ST downsloping in leads V3/V4/V5/V6.  There are small, moderate defect in the anterior apical, distal anterior and distal inferior lateral walls that are fixed, suggestive of breast attenuation artifact.  The observed defect no longer significant with prone imaging.  Normal wall motion with post-rest EF 62%.  Findings were discussed with the patient.\par --Recent laboratory work was reviewed.  Asked patient to please have recent blood work sent to me for review.  She is look for an internist.  \par --Previously reviewed catheterization with patient that demonstrated severe 1 CAD s/p PCI of LAD (October 11, 2021).  No other significant disease.\par --Reviewed TTE from 4/28/2022 with the patient.  Mild diastolic dysfunction.\par --The patient is status post UFE for secondary anemia hx requiring multiple transfusions 11 & 12/2021. \par --Continue on aspirin 81mg daily.  May discontinue clopidogrel 75mg daily.  Signs and symptoms of bleeding were gone over. \par --The patient was recently hospitalized with severe vaginal bleeding from anemia (2 hospitalizations - 2 days).  Each hospitalization she has been transfused 2U of PRBC.  She was treated with Aygestin 5mg x10 days \par --Reviewed TTE from 4/28/2022 with the patient.\par --Avoid all NSAIDs.  May take Tylenol for discomfort.  Pros/cons and risk/benefits of DAPT therapy was gone over.  \par --Reviewed laboratory work with the patient.\par --She underwent MRI on 1/4/2022 for better evaluation of endometrium per last US read showing bulky uterus with asymmetric thickening of endometrium infiltrating into anterior myometrium and to rule out neoplasm.  Results are pending.\par --Status post drug-eluting stent/[place] on October 12, 2021.  Radial approach.  LVEDP 12 mmHg.  No aortic valve stenosis.  Normal left main with right dominant system.\par --Continue Ozempic, Jardiance and metformin.\par --Continue atorvastatin 80 mg daily.\par --Continue ferrous sulfate.\par --Recommend a repeat TTE be performed if any change in her symptoms.  Indications and details of study was gone over.\par --The patient is morbidity obese (BMI).  Discussed strategies to lose weight and short and long term complications.\par --EKG performed do to history of coronary artery disease, hyperlipidemia and hypertension.\par --It was reviewed again with the patient the importance of following a heart healthy lifestyle. AHA/ACC guidelines stress the importance of lifestyle modifications to lower cardiovascular disease risk. This includes eating a heart-healthy diet (fresh fruits/vegetable, whole grains, limit intake of sweets, sugar-sweetened beverages, red meats). He was encouraged to perform regular aerobic exercises at least 30 minutes of moderate intensity exercise at least 4 times a week and to maintain a desirable body weight and avoid tobacco products or second hand exposure.\par \par All questions and concerns of the patient were addressed.  Follow-up 6 months with goal to lose 15-20lbs until she is reassessed.

## 2023-05-29 NOTE — PHYSICAL EXAM
[Well Developed] : well developed [Well Nourished] : well nourished [Normal Conjunctiva] : normal conjunctiva [No Acute Distress] : no acute distress [Normal Venous Pressure] : normal venous pressure [No Carotid Bruit] : no carotid bruit [Normal S1, S2] : normal S1, S2 [No Murmur] : no murmur [No Rub] : no rub [No Gallop] : no gallop [Clear Lung Fields] : clear lung fields [Good Air Entry] : good air entry [No Respiratory Distress] : no respiratory distress  [Soft] : abdomen soft [Non Tender] : non-tender [No Masses/organomegaly] : no masses/organomegaly [Normal Bowel Sounds] : normal bowel sounds [Normal Gait] : normal gait [No Edema] : no edema [No Cyanosis] : no cyanosis [No Clubbing] : no clubbing [No Rash] : no rash [No Varicosities] : no varicosities [No Skin Lesions] : no skin lesions [Moves all extremities] : moves all extremities [No Focal Deficits] : no focal deficits [Normal Speech] : normal speech [Alert and Oriented] : alert and oriented [Normal memory] : normal memory [de-identified] : Right radial - clean/dry/intact with no thrill

## 2023-06-13 ENCOUNTER — APPOINTMENT (OUTPATIENT)
Dept: ENDOCRINOLOGY | Facility: CLINIC | Age: 53
End: 2023-06-13
Payer: COMMERCIAL

## 2023-06-13 PROCEDURE — G0108 DIAB MANAGE TRN  PER INDIV: CPT

## 2023-06-15 RX ORDER — PEN NEEDLE, DIABETIC 32GX 5/32"
32G X 4 MM NEEDLE, DISPOSABLE MISCELLANEOUS
Qty: 1 | Refills: 1 | Status: ACTIVE | COMMUNITY
Start: 2022-04-06 | End: 1900-01-01

## 2023-06-15 NOTE — ED ADULT NURSE NOTE - CHIEF COMPLAINT QUOTE
needle stick Prednisone Pregnancy And Lactation Text: This medication is Pregnancy Category C and it isn't know if it is safe during pregnancy. This medication is excreted in breast milk.

## 2023-07-13 ENCOUNTER — RX RENEWAL (OUTPATIENT)
Age: 53
End: 2023-07-13

## 2023-07-17 ENCOUNTER — TRANSCRIPTION ENCOUNTER (OUTPATIENT)
Age: 53
End: 2023-07-17

## 2023-07-20 ENCOUNTER — RX RENEWAL (OUTPATIENT)
Age: 53
End: 2023-07-20

## 2023-09-12 NOTE — ED ADULT NURSE NOTE - NS ED NOTE  TALK SOMEONE YN
Mucinex DM twice daily    Sudafed in morning    Restart Flonase nasal spray    You can you Afrin nasal spray if needed when flying in future if needed  
No

## 2023-10-03 ENCOUNTER — APPOINTMENT (OUTPATIENT)
Dept: INTERNAL MEDICINE | Facility: CLINIC | Age: 53
End: 2023-10-03

## 2023-10-03 ENCOUNTER — OUTPATIENT (OUTPATIENT)
Dept: OUTPATIENT SERVICES | Facility: HOSPITAL | Age: 53
LOS: 1 days | End: 2023-10-03

## 2023-10-03 DIAGNOSIS — Z98.51 TUBAL LIGATION STATUS: Chronic | ICD-10-CM

## 2023-10-03 DIAGNOSIS — Z95.5 PRESENCE OF CORONARY ANGIOPLASTY IMPLANT AND GRAFT: Chronic | ICD-10-CM

## 2023-10-03 DIAGNOSIS — Z90.49 ACQUIRED ABSENCE OF OTHER SPECIFIED PARTS OF DIGESTIVE TRACT: Chronic | ICD-10-CM

## 2023-10-10 LAB
ALBUMIN SERPL ELPH-MCNC: 4.5 G/DL
ALP BLD-CCNC: 133 U/L
ALT SERPL-CCNC: 20 U/L
ANION GAP SERPL CALC-SCNC: 12 MMOL/L
AST SERPL-CCNC: 26 U/L
BILIRUB SERPL-MCNC: 0.2 MG/DL
BUN SERPL-MCNC: 18 MG/DL
CALCIUM SERPL-MCNC: 10.2 MG/DL
CHLORIDE SERPL-SCNC: 106 MMOL/L
CHOLEST SERPL-MCNC: 208 MG/DL
CO2 SERPL-SCNC: 25 MMOL/L
CREAT SERPL-MCNC: 0.66 MG/DL
CREAT SPEC-SCNC: 76 MG/DL
EGFR: 106 ML/MIN/1.73M2
ESTIMATED AVERAGE GLUCOSE: 169 MG/DL
GLUCOSE SERPL-MCNC: 142 MG/DL
HBA1C MFR BLD HPLC: 7.5 %
HDLC SERPL-MCNC: 50 MG/DL
LDLC SERPL CALC-MCNC: 120 MG/DL
MICROALBUMIN 24H UR DL<=1MG/L-MCNC: <1.2 MG/DL
MICROALBUMIN/CREAT 24H UR-RTO: NORMAL MG/G
NONHDLC SERPL-MCNC: 157 MG/DL
POTASSIUM SERPL-SCNC: 3.8 MMOL/L
PROT SERPL-MCNC: 7.5 G/DL
SODIUM SERPL-SCNC: 143 MMOL/L
TRIGL SERPL-MCNC: 209 MG/DL
TSH SERPL-ACNC: 1.54 UIU/ML

## 2023-10-11 ENCOUNTER — RX RENEWAL (OUTPATIENT)
Age: 53
End: 2023-10-11

## 2023-10-16 ENCOUNTER — RX RENEWAL (OUTPATIENT)
Age: 53
End: 2023-10-16

## 2023-10-19 ENCOUNTER — RX RENEWAL (OUTPATIENT)
Age: 53
End: 2023-10-19

## 2023-10-19 RX ORDER — FLASH GLUCOSE SCANNING READER
EACH MISCELLANEOUS
Qty: 1 | Refills: 1 | Status: ACTIVE | COMMUNITY
Start: 2023-10-19 | End: 1900-01-01

## 2023-10-19 RX ORDER — FLASH GLUCOSE SENSOR
KIT MISCELLANEOUS
Qty: 2 | Refills: 5 | Status: ACTIVE | COMMUNITY
Start: 2023-06-13 | End: 1900-01-01

## 2023-10-24 ENCOUNTER — RX RENEWAL (OUTPATIENT)
Age: 53
End: 2023-10-24

## 2023-10-27 ENCOUNTER — RX RENEWAL (OUTPATIENT)
Age: 53
End: 2023-10-27

## 2023-12-07 ENCOUNTER — APPOINTMENT (OUTPATIENT)
Dept: ENDOCRINOLOGY | Facility: CLINIC | Age: 53
End: 2023-12-07
Payer: COMMERCIAL

## 2023-12-07 VITALS
SYSTOLIC BLOOD PRESSURE: 129 MMHG | HEART RATE: 79 BPM | DIASTOLIC BLOOD PRESSURE: 87 MMHG | OXYGEN SATURATION: 99 % | WEIGHT: 223 LBS | BODY MASS INDEX: 37.15 KG/M2 | HEIGHT: 65 IN

## 2023-12-07 DIAGNOSIS — E11.9 TYPE 2 DIABETES MELLITUS W/OUT COMPLICATIONS: ICD-10-CM

## 2023-12-07 DIAGNOSIS — E66.9 OBESITY, UNSPECIFIED: ICD-10-CM

## 2023-12-07 LAB — HBA1C MFR BLD HPLC: 6.6

## 2023-12-07 PROCEDURE — 83036 HEMOGLOBIN GLYCOSYLATED A1C: CPT | Mod: QW

## 2023-12-07 PROCEDURE — 36415 COLL VENOUS BLD VENIPUNCTURE: CPT

## 2023-12-07 PROCEDURE — 99213 OFFICE O/P EST LOW 20 MIN: CPT | Mod: 25

## 2023-12-07 RX ORDER — INSULIN GLARGINE-YFGN 100 [IU]/ML
100 INJECTION, SOLUTION SUBCUTANEOUS AT BEDTIME
Qty: 3 | Refills: 2 | Status: ACTIVE | COMMUNITY
Start: 2022-06-13 | End: 1900-01-01

## 2023-12-07 RX ORDER — EMPAGLIFLOZIN 25 MG/1
25 TABLET, FILM COATED ORAL
Qty: 90 | Refills: 3 | Status: ACTIVE | COMMUNITY
Start: 2021-11-02 | End: 1900-01-01

## 2023-12-11 LAB
ALBUMIN SERPL ELPH-MCNC: 4.5 G/DL
ALP BLD-CCNC: 133 U/L
ALT SERPL-CCNC: 17 U/L
ANION GAP SERPL CALC-SCNC: 11 MMOL/L
AST SERPL-CCNC: 17 U/L
BILIRUB SERPL-MCNC: 0.4 MG/DL
BUN SERPL-MCNC: 16 MG/DL
CALCIUM SERPL-MCNC: 9.4 MG/DL
CHLORIDE SERPL-SCNC: 103 MMOL/L
CHOLEST SERPL-MCNC: 149 MG/DL
CO2 SERPL-SCNC: 28 MMOL/L
CREAT SERPL-MCNC: 0.59 MG/DL
EGFR: 108 ML/MIN/1.73M2
GLUCOSE SERPL-MCNC: 113 MG/DL
HDLC SERPL-MCNC: 50 MG/DL
LDLC SERPL CALC-MCNC: 78 MG/DL
NONHDLC SERPL-MCNC: 100 MG/DL
POTASSIUM SERPL-SCNC: 4.2 MMOL/L
PROT SERPL-MCNC: 7.1 G/DL
SODIUM SERPL-SCNC: 142 MMOL/L
T4 FREE SERPL-MCNC: 1.2 NG/DL
TRIGL SERPL-MCNC: 118 MG/DL
TSH SERPL-ACNC: 1.69 UIU/ML

## 2023-12-21 ENCOUNTER — APPOINTMENT (OUTPATIENT)
Dept: CARDIOLOGY | Facility: CLINIC | Age: 53
End: 2023-12-21
Payer: COMMERCIAL

## 2023-12-21 VITALS
HEART RATE: 83 BPM | BODY MASS INDEX: 36.99 KG/M2 | SYSTOLIC BLOOD PRESSURE: 120 MMHG | DIASTOLIC BLOOD PRESSURE: 79 MMHG | OXYGEN SATURATION: 99 % | HEIGHT: 65 IN | WEIGHT: 222 LBS

## 2023-12-21 DIAGNOSIS — I25.118 ATHEROSCLEROTIC HEART DISEASE OF NATIVE CORONARY ARTERY WITH OTHER FORMS OF ANGINA PECTORIS: ICD-10-CM

## 2023-12-21 DIAGNOSIS — E78.5 HYPERLIPIDEMIA, UNSPECIFIED: ICD-10-CM

## 2023-12-21 DIAGNOSIS — I10 ESSENTIAL (PRIMARY) HYPERTENSION: ICD-10-CM

## 2023-12-21 DIAGNOSIS — R93.1 ABNORMAL FINDINGS ON DIAGNOSTIC IMAGING OF HEART AND CORONARY CIRCULATION: ICD-10-CM

## 2023-12-21 DIAGNOSIS — I25.10 ATHEROSCLEROTIC HEART DISEASE OF NATIVE CORONARY ARTERY W/OUT ANGINA PECTORIS: ICD-10-CM

## 2023-12-21 DIAGNOSIS — R94.39 ABNORMAL RESULT OF OTHER CARDIOVASCULAR FUNCTION STUDY: ICD-10-CM

## 2023-12-21 PROCEDURE — 93000 ELECTROCARDIOGRAM COMPLETE: CPT

## 2023-12-21 PROCEDURE — 99214 OFFICE O/P EST MOD 30 MIN: CPT

## 2023-12-22 PROBLEM — I25.10 CAD (CORONARY ARTERY DISEASE): Status: ACTIVE | Noted: 2021-10-28

## 2023-12-22 PROBLEM — E78.5 DYSLIPIDEMIA: Status: ACTIVE | Noted: 2021-10-28

## 2023-12-22 PROBLEM — I10 HYPERTENSION: Status: ACTIVE | Noted: 2021-10-28

## 2023-12-22 PROBLEM — R94.39 ABNORMAL STRESS TEST: Status: ACTIVE | Noted: 2022-11-18

## 2023-12-22 PROBLEM — R93.1 ABNORMAL ECHOCARDIOGRAM: Status: ACTIVE | Noted: 2021-10-28

## 2023-12-22 PROBLEM — I25.118 CORONARY ARTERY DISEASE WITH STABLE ANGINA PECTORIS: Status: ACTIVE | Noted: 2022-06-04

## 2023-12-22 NOTE — REASON FOR VISIT
[Structural Heart and Valve Disease] : structural heart and valve disease [Hyperlipidemia] : hyperlipidemia [Hypertension] : hypertension [FreeTextEntry1] : 53-year-old female with a past medical history significant for  Coronary artery disease s/p NSTEMI/PCI (mLAD) - 10/11/2021 Type 2 diabetes mellitus (hemoglobin A1c 11.4%, Metformin) Varicose veins of bilateral lower extremities Cholecystectomy Stab phlebectomy Tubal ligation bilaterally Pre-menopause  Since she was in the hospital she is doing better.  Has had initially a few episodes when she felt that something was sticking her that occurred a few days after the procedure.  When she ambulates no chest pain/tightness/discomfort, light-headed sensation, dizziness or palpations.  No blood in her stool or urine.  No fevers, chills or sweats.  Prior to the episodes she had severe anxiety with chest pain that has now resolved when she gets anxious.  Ms. Phoenix ambulates 2.5 miles in the morning.  Notes significant changes in her diet.  Has a decrease in appetite and has cut portion sizes.  Eating more vegetables.  Cutting out fried foods.    ================ 1/13/2022 Twice the patient was in the ER for severe blood loss for which she was anemic.  She was last in the ER the day after [place].  The patient has large fibroids.  She is getting injections by her gynecologist.  The patient was transitioned from Brillinta to clopidogrel 75mg daily.  Ms. Phoenix had her blood work checked on 12/27/2021. ============== 2/24/2022 The patient reports that she feels okay, sometimes.  Suffers from anxiety.  No chest pain/tightness/discomfort, shortness of breath, lower extremity swelling, abdominal pain, fevers or chills.  The patient is able to walk unlimited distances.  The patient has not had any bleeding issues for over a month.  When she develops severe bleeding feels light-headed and dizzy.  No current formal exercise.  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      ============= 5/12/2022 The patient sometimes experiences a sharp pain in the left side of her chest.  It can occur at rest or upon exertion.  It last for a few seconds.  No provoking events.  No shortness of breath, lower extremity swelling, PND or change in orthopnea (1-2 pillows).  No fevers, chills or sweats.  The patient was recently seen by IR for following for her symptomatic uterine fibroids.  She is status post UFE (incl right ovarian A that supplied ovarian fibroid) on 3/17/22.  Ms Phoenix reports feeling well overall & states that she had menses during UFE which stopped 4/11/22 - states menstrual flow was not heavy - utilized light pads. She denies fever, chills, pelvic pain, dysuria, n/v/d, no access site issues.  ================== 8/25/2022 The patient is doing well.  No cardiopulmonary complaints at rest or upon exertion.  Walking unlimited distances without any issues.  No formal exercise program.   No shortness of breath, lower extremity swelling, PND or change in orthopnea (1-2 pillows).  No fevers, chills or sweats. No blood in her stool or urine.  No fevers, chills or sweats.   ==================== 11/17/2022 Doing much better since she left the hospital.  The patient can walk unlimited distance.  No cardiopulmonary complaints at rest or upon exertion.  No formal exercise program.   No shortness of breath, lower extremity swelling, PND or change in orthopnea (1-2 pillows).  No blood in her stool or urine.  No fevers, chills or sweats.  She is not eating meat.  Following a heart healthy diet.  ======================= 5/18/2023 No cardiopulmonary complaints at rest or upon exertion.  Does not follow any formal exercise program.   No shortness of breath, lower extremity swelling, PND or change in orthopnea (1-2 pillows).  No blood in her stool or urine.  No fevers, chills or sweats.  The patient has not been exercising.  She stopped taking the clopidogrel.  ================================= 12/21/2023 She is feeling good.  No cardiopulmonary complaints at rest or upon exertion.   No shortness of breath, lower extremity swelling, PND or change in orthopnea (1-2 pillows).  No blood in her stool or urine.  No fevers, chills or sweats.  She is exercising.  Joined a gym in [place].  The patient was going everyday but started doing doubles at work so has not been in over a month.  Eating better.  Has not lost any weight.  Gynecologist - Dr. 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  She presented on October 11, 2021 with a NSTEMI.  She underwent cardiac catheterization and found to have severe obstructive disease of the left anterior descending artery.  Middle left anterior descending artery drug-eluting stent was inserted.  The patient tolerated the procedure well.  No discomfort in her wrist.  Overall she is now doing well with no cardiopulmonary complaints at rest or upon exertion.  Taking all her medications as prescribed.  --Recent blood work was reviewed. --TTE on 9/19/2022 demonstrated normal mitral valve with mild mitral valve regurgitation.  No aortic valve regurgitation.  Normal left ventricular systolic function.  No segmental wall motion abnormalities.  Normal diastolic function.  Normal right ventricular size and function.  Findings were discussed with the patient. --Stress test on 9/20/2022 demonstrate exercise capacity of 8 METS.  Appropriate heart rate/blood pressure response.  1 mm ST downsloping in leads V3/V4/V5/V6.  There are small, moderate defect in the anterior apical, distal anterior and distal inferior lateral walls that are fixed, suggestive of breast attenuation artifact.  The observed defect no longer significant with prone imaging.  Normal wall motion with post-rest EF 62%.  Findings were discussed with the patient.  She has no cardiopulmonary complaints at rest or upon exertion. --Recent laboratory work was reviewed.  Asked patient to please have recent blood work sent to me for review.  She is look for an internist.   --Previously reviewed catheterization with patient that demonstrated severe 1 CAD s/p PCI of LAD (October 11, 2021).  No other significant disease. --Reviewed TTE from 4/28/2022 with the patient.  Mild diastolic dysfunction. --The patient is status post UFE for secondary anemia hx requiring multiple transfusions 11 & 12/2021.  --Continue on aspirin 81mg daily.  May discontinue clopidogrel 75mg daily.  Signs and symptoms of bleeding were gone over.  --The patient was hospitalized with severe vaginal bleeding from anemia (2 hospitalizations - 2 days).  Each hospitalization she has been transfused 2U of PRBC.  She was treated with Aygestin 5mg x10 days  --Reviewed TTE from 4/28/2022 with the patient. --Avoid all NSAIDs.  May take Tylenol for discomfort.  Pros/cons and risk/benefits of DAPT therapy was gone over.   --Reviewed laboratory work with the patient. --She underwent MRI on 1/4/2022 for better evaluation of endometrium per last US read showing bulky uterus with asymmetric thickening of endometrium infiltrating into anterior myometrium and to rule out neoplasm.  Results are pending. --Status post drug-eluting stent/[place] on October 12, 2021.  Radial approach.  LVEDP 12 mmHg.  No aortic valve stenosis.  Normal left main with right dominant system. --Continue on Semglee 35U at bedtime, Ozempic 0.5mg weekly and metformin 1000mg po BID and Jardiance 25mg daily. --Continue atorvastatin 80 mg daily. --Continue ferrous sulfate. --Recommend a repeat TTE be performed if any change in her symptoms.  Indications and details of study was gone over. --The patient is morbidity obese (BMI).  Discussed strategies to lose weight and short and long term complications. --EKG performed due to history of coronary artery disease, hyperlipidemia and hypertension. --It was reviewed again with the patient the importance of following a heart healthy lifestyle. AHA/ACC guidelines stress the importance of lifestyle modifications to lower cardiovascular disease risk. This includes eating a heart-healthy diet (fresh fruits/vegetable, whole grains, limit intake of sweets, sugar-sweetened beverages, red meats). He was encouraged to perform regular aerobic exercises at least 30 minutes of moderate intensity exercise at least 4 times a week and to maintain a desirable body weight and avoid tobacco products or second hand exposure.  All questions and concerns of the patient were addressed.  Follow-up 6 months with goal to lose 15-20lbs.

## 2023-12-22 NOTE — PHYSICAL EXAM
[Well Developed] : well developed [Well Nourished] : well nourished [Normal Conjunctiva] : normal conjunctiva [No Acute Distress] : no acute distress [Normal Venous Pressure] : normal venous pressure [No Carotid Bruit] : no carotid bruit [Normal S1, S2] : normal S1, S2 [No Murmur] : no murmur [No Rub] : no rub [No Gallop] : no gallop [Clear Lung Fields] : clear lung fields [Good Air Entry] : good air entry [No Respiratory Distress] : no respiratory distress  [Soft] : abdomen soft [Non Tender] : non-tender [No Masses/organomegaly] : no masses/organomegaly [Normal Bowel Sounds] : normal bowel sounds [Normal Gait] : normal gait [No Edema] : no edema [No Cyanosis] : no cyanosis [No Clubbing] : no clubbing [No Varicosities] : no varicosities [No Rash] : no rash [No Skin Lesions] : no skin lesions [Moves all extremities] : moves all extremities [No Focal Deficits] : no focal deficits [Normal Speech] : normal speech [Alert and Oriented] : alert and oriented [Normal memory] : normal memory [de-identified] : Right radial - clean/dry/intact with no thrill

## 2023-12-29 ENCOUNTER — RX RENEWAL (OUTPATIENT)
Age: 53
End: 2023-12-29

## 2024-01-09 ENCOUNTER — RX RENEWAL (OUTPATIENT)
Age: 54
End: 2024-01-09

## 2024-01-14 ENCOUNTER — NON-APPOINTMENT (OUTPATIENT)
Age: 54
End: 2024-01-14

## 2024-02-28 ENCOUNTER — APPOINTMENT (OUTPATIENT)
Dept: ORTHOPEDIC SURGERY | Facility: CLINIC | Age: 54
End: 2024-02-28
Payer: COMMERCIAL

## 2024-02-28 PROCEDURE — 72110 X-RAY EXAM L-2 SPINE 4/>VWS: CPT

## 2024-02-28 PROCEDURE — 72170 X-RAY EXAM OF PELVIS: CPT

## 2024-02-28 PROCEDURE — 99204 OFFICE O/P NEW MOD 45 MIN: CPT

## 2024-02-28 RX ORDER — NAPROXEN 500 MG/1
500 TABLET ORAL
Qty: 60 | Refills: 0 | Status: ACTIVE | COMMUNITY
Start: 2024-02-28 | End: 1900-01-01

## 2024-02-28 RX ORDER — CYCLOBENZAPRINE HYDROCHLORIDE 5 MG/1
5 TABLET, FILM COATED ORAL
Qty: 45 | Refills: 1 | Status: ACTIVE | COMMUNITY
Start: 2024-02-28 | End: 1900-01-01

## 2024-02-28 NOTE — HISTORY OF PRESENT ILLNESS
[Lower back] : lower back [Throbbing] : throbbing [de-identified] : 2/28/24- Patient has been experiencing on and off pain for the past year after lifting a patient at work. Patient denies numbness/tingling; pain travels down the left leg. Patient was taking muscle relaxers which helped the pain. No bb dysfunction.  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

## 2024-02-28 NOTE — ASSESSMENT
[FreeTextEntry1] : PT  Naproxyn: NSAIDs- Patient warned of risk of medication to GI tract, increased blood pressure, cardiac risk, and risk of fluid retention.  Advised to clear medication with internist or PCP if any concurrent health problem with heart, blood pressure, or GI system exists. Cyclobenzaprine: Muscle Relaxants- To help decrease muscle spasm and assist with pain relief. Advised of sedating effects and instructed not to drive, operate heavy machinery, or take with other sedating medications.

## 2024-02-28 NOTE — IMAGING
[de-identified] : LSPINE Inspection: No rash or ecchymosis Palpation: No tenderness to palpation or spasm in bilateral thoracic and lumbar paraspinal musculature, left SI joint tenderness to palpation ROM: Full with no pain Strength: 5/5 bilateral hip flexors, knee extensors, ankle dorsiflexors, EHL, ankle plantarflexors Sensation: Sensation present to light touch bilateral L2-S1 distributions Provocative maneuvers: Negative bilateral straight leg raise

## 2024-03-05 ENCOUNTER — NON-APPOINTMENT (OUTPATIENT)
Age: 54
End: 2024-03-05

## 2024-03-05 ENCOUNTER — APPOINTMENT (OUTPATIENT)
Dept: OBGYN | Facility: CLINIC | Age: 54
End: 2024-03-05
Payer: COMMERCIAL

## 2024-03-05 VITALS
WEIGHT: 218 LBS | HEIGHT: 67 IN | SYSTOLIC BLOOD PRESSURE: 124 MMHG | BODY MASS INDEX: 34.21 KG/M2 | DIASTOLIC BLOOD PRESSURE: 81 MMHG

## 2024-03-05 DIAGNOSIS — Z01.419 ENCOUNTER FOR GYNECOLOGICAL EXAMINATION (GENERAL) (ROUTINE) W/OUT ABNORMAL FINDINGS: ICD-10-CM

## 2024-03-05 PROCEDURE — 99386 PREV VISIT NEW AGE 40-64: CPT

## 2024-03-05 NOTE — DISCUSSION/SUMMARY
[FreeTextEntry1] : thin prep and HPV ordered referral given for mammogram rto in 1 year or soon for any gyn issues. pt verbalized understanding.

## 2024-03-05 NOTE — HISTORY OF PRESENT ILLNESS
[Patient reported PAP Smear was normal] : Patient reported PAP Smear was normal [FreeTextEntry1] : 52 y/o  female, LMP: 2 mths ago, presents for annual GYN visit to establish care, was seeing Dr Delarosa but switching gyn bc insurance.  Patient denies any GYN complaints.   Sexual Activity: monogamous with  Social/Mental Health: Denies ETOH, tobacco or any illicit drug use.  Denies depression, anxiety, thoughts of personal harm or suicidal ideation.  ROS:  Denies fever/chills, HA, Cough/sore throat, CP, SOB, N/V, Diarrhea/Constipation, Pelvic pain, Urinary frequency/urgency/incontinence, irregular vaginal bleeding, discharge or irritation.    Medical History GYN HX: 3 , 1 top, fibroids, UFE. PMH: HTN, HLD, CAD w NSTEMI s/p MONI 10/2021, DM II, BLE Varicose Veins, symptomatic uterine fibroids with h/o DUB requiring with secondary anemia requiring pRBCs Nov & Dec 2021. adenomyosis. PSH: UFE 3/17/2022, NSTEMI, gallbladder removed. Meds: metformin, Ozempic, Jardiance, baby ASA, atorvastatin, bp meds Allergies: NKDA fam hx: denies [Patient reported mammogram was normal] : Patient reported mammogram was normal [Mammogramdate] : yrs ago [PapSmeardate] : 3/2023 [TextBox_43] : will f/u PMD [Experiencing Menopausal Sxs] : experiencing menopausal symptoms [Currently Active] : currently active [Men] : men [Vaginal] : vaginal [No] : No [Patient refuses STI testing] : Patient refuses STI testing

## 2024-03-08 LAB
C TRACH RRNA SPEC QL NAA+PROBE: NOT DETECTED
CYTOLOGY CVX/VAG DOC THIN PREP: ABNORMAL
HPV HIGH+LOW RISK DNA PNL CVX: NOT DETECTED
N GONORRHOEA RRNA SPEC QL NAA+PROBE: NOT DETECTED
SOURCE AMPLIFICATION: NORMAL

## 2024-04-02 ENCOUNTER — RX RENEWAL (OUTPATIENT)
Age: 54
End: 2024-04-02

## 2024-04-02 RX ORDER — METFORMIN ER 500 MG 500 MG/1
500 TABLET ORAL
Qty: 360 | Refills: 1 | Status: ACTIVE | COMMUNITY
Start: 2022-03-15 | End: 1900-01-01

## 2024-04-05 ENCOUNTER — NON-APPOINTMENT (OUTPATIENT)
Age: 54
End: 2024-04-05

## 2024-04-10 ENCOUNTER — APPOINTMENT (OUTPATIENT)
Dept: ORTHOPEDIC SURGERY | Facility: CLINIC | Age: 54
End: 2024-04-10
Payer: COMMERCIAL

## 2024-04-10 VITALS — HEIGHT: 67 IN | WEIGHT: 218 LBS | BODY MASS INDEX: 34.21 KG/M2

## 2024-04-10 DIAGNOSIS — M46.1 SACROILIITIS, NOT ELSEWHERE CLASSIFIED: ICD-10-CM

## 2024-04-10 DIAGNOSIS — M54.16 RADICULOPATHY, LUMBAR REGION: ICD-10-CM

## 2024-04-10 PROCEDURE — 99214 OFFICE O/P EST MOD 30 MIN: CPT

## 2024-04-10 NOTE — IMAGING
[de-identified] : LSPINE  Palpation: No tenderness to palpation or spasm in bilateral thoracic and lumbar paraspinal musculature, left SI joint tenderness to palpation ROM: Full with no pain Strength: 5/5 bilateral hip flexors, knee extensors, ankle dorsiflexors, EHL, ankle plantarflexors Sensation: Sensation present to light touch bilateral L2-S1 distributions Provocative maneuvers: Negative bilateral straight leg raise

## 2024-04-10 NOTE — HISTORY OF PRESENT ILLNESS
[Lower back] : lower back [Throbbing] : throbbing [de-identified] : 2/28/24- Patient has been experiencing on and off pain for the past year after lifting a patient at work. Patient denies numbness/tingling; pain travels down the left leg. Patient was taking muscle relaxers which helped the pain. No bb dysfunction.   4/10/24-Lower back pain improving since last visit.  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

## 2024-05-14 ENCOUNTER — RX RENEWAL (OUTPATIENT)
Age: 54
End: 2024-05-14

## 2024-06-03 NOTE — ED ADULT NURSE NOTE - CAS DISCH ACCOMP BY
Self Pt. c/o left foot pain and swelling x few days. Denies injury. Ambulatory in triage. hx of peripheral artery disease, DM, HTN

## 2024-06-27 ENCOUNTER — APPOINTMENT (OUTPATIENT)
Dept: CARDIOLOGY | Facility: CLINIC | Age: 54
End: 2024-06-27

## 2024-06-27 ENCOUNTER — NON-APPOINTMENT (OUTPATIENT)
Age: 54
End: 2024-06-27

## 2024-06-27 VITALS
WEIGHT: 210 LBS | HEIGHT: 67 IN | HEART RATE: 79 BPM | OXYGEN SATURATION: 98 % | DIASTOLIC BLOOD PRESSURE: 76 MMHG | BODY MASS INDEX: 32.96 KG/M2 | SYSTOLIC BLOOD PRESSURE: 121 MMHG

## 2024-06-27 VITALS — OXYGEN SATURATION: 100 %

## 2024-06-27 PROCEDURE — 93000 ELECTROCARDIOGRAM COMPLETE: CPT

## 2024-06-27 PROCEDURE — 99214 OFFICE O/P EST MOD 30 MIN: CPT

## 2024-06-27 PROCEDURE — G2211 COMPLEX E/M VISIT ADD ON: CPT

## 2024-07-02 ENCOUNTER — RX RENEWAL (OUTPATIENT)
Age: 54
End: 2024-07-02

## 2024-08-31 ENCOUNTER — OUTPATIENT (OUTPATIENT)
Dept: OUTPATIENT SERVICES | Facility: HOSPITAL | Age: 54
LOS: 1 days | End: 2024-08-31
Payer: COMMERCIAL

## 2024-08-31 ENCOUNTER — RESULT REVIEW (OUTPATIENT)
Age: 54
End: 2024-08-31

## 2024-08-31 ENCOUNTER — APPOINTMENT (OUTPATIENT)
Dept: MAMMOGRAPHY | Facility: CLINIC | Age: 54
End: 2024-08-31
Payer: COMMERCIAL

## 2024-08-31 DIAGNOSIS — Z00.00 ENCOUNTER FOR GENERAL ADULT MEDICAL EXAMINATION WITHOUT ABNORMAL FINDINGS: ICD-10-CM

## 2024-08-31 DIAGNOSIS — Z98.51 TUBAL LIGATION STATUS: Chronic | ICD-10-CM

## 2024-08-31 DIAGNOSIS — Z95.5 PRESENCE OF CORONARY ANGIOPLASTY IMPLANT AND GRAFT: Chronic | ICD-10-CM

## 2024-08-31 DIAGNOSIS — Z90.49 ACQUIRED ABSENCE OF OTHER SPECIFIED PARTS OF DIGESTIVE TRACT: Chronic | ICD-10-CM

## 2024-08-31 PROCEDURE — 77063 BREAST TOMOSYNTHESIS BI: CPT | Mod: 26

## 2024-08-31 PROCEDURE — 77067 SCR MAMMO BI INCL CAD: CPT | Mod: 26

## 2024-08-31 PROCEDURE — 77063 BREAST TOMOSYNTHESIS BI: CPT

## 2024-08-31 PROCEDURE — 77067 SCR MAMMO BI INCL CAD: CPT

## 2024-09-05 ENCOUNTER — EMERGENCY (EMERGENCY)
Facility: HOSPITAL | Age: 54
LOS: 1 days | Discharge: HOME | End: 2024-09-05
Attending: EMERGENCY MEDICINE
Payer: COMMERCIAL

## 2024-09-05 VITALS
SYSTOLIC BLOOD PRESSURE: 148 MMHG | OXYGEN SATURATION: 98 % | DIASTOLIC BLOOD PRESSURE: 80 MMHG | TEMPERATURE: 98 F | RESPIRATION RATE: 18 BRPM | HEART RATE: 80 BPM | HEIGHT: 66 IN | WEIGHT: 210.1 LBS

## 2024-09-05 DIAGNOSIS — Z90.49 ACQUIRED ABSENCE OF OTHER SPECIFIED PARTS OF DIGESTIVE TRACT: Chronic | ICD-10-CM

## 2024-09-05 DIAGNOSIS — Z95.5 PRESENCE OF CORONARY ANGIOPLASTY IMPLANT AND GRAFT: Chronic | ICD-10-CM

## 2024-09-05 DIAGNOSIS — Z98.51 TUBAL LIGATION STATUS: Chronic | ICD-10-CM

## 2024-09-05 PROCEDURE — 73562 X-RAY EXAM OF KNEE 3: CPT | Mod: 26,RT

## 2024-09-05 PROCEDURE — 99283 EMERGENCY DEPT VISIT LOW MDM: CPT | Mod: 25

## 2024-09-05 PROCEDURE — 73562 X-RAY EXAM OF KNEE 3: CPT

## 2024-09-05 PROCEDURE — 99284 EMERGENCY DEPT VISIT MOD MDM: CPT

## 2024-09-05 RX ORDER — IBUPROFEN 600 MG
600 TABLET ORAL ONCE
Refills: 0 | Status: COMPLETED | OUTPATIENT
Start: 2024-09-05 | End: 2024-09-05

## 2024-09-05 RX ADMIN — Medication 600 MILLIGRAM(S): at 18:05

## 2024-09-05 NOTE — ED PROVIDER NOTE - NSFOLLOWUPINSTRUCTIONS_ED_ALL_ED_FT
Continue R.I.C.E. to knee - Rest, Ice, Compression and Elevation when resting.    You may take Ibuprofen every 6 hours as needed for pain.     Return to the Emergency Department if:  -Your  pain is getting worse.  - You feel worsened numbness or tingling  - You are unable to walk

## 2024-09-05 NOTE — ED ADULT NURSE NOTE - NSFALLUNIVINTERV_ED_ALL_ED
Bed/Stretcher in lowest position, wheels locked, appropriate side rails in place/Call bell, personal items and telephone in reach/Instruct patient to call for assistance before getting out of bed/chair/stretcher/Non-slip footwear applied when patient is off stretcher/[place] to call system/Physically safe environment - no spills, clutter or unnecessary equipment/Purposeful proactive rounding/Room/bathroom lighting operational, light cord in reach

## 2024-09-05 NOTE — ED ADULT TRIAGE NOTE - CCCP TRG CHIEF CMPLNT
Detail Level: Zone knee pain/injury Detail Level: Detailed Detail Level: Generalized Detail Level: Simple Speaking Coherently

## 2024-09-05 NOTE — ED PROVIDER NOTE - CLINICAL SUMMARY MEDICAL DECISION MAKING FREE TEXT BOX
54 yo female patient with right knee pain s/p trauma to knee hours prior to arrival in the ED. Appears in NAD, HDS, AF. Exam with Normocephalic head, atraumatic. PERRLA. MMM. Supple neck. S1/S2 normal; no murmurs. CTABL. ABD soft., NTND. CN II-XII intact. Right knee with mild tenderness to medial and lateral aspect of patella. No TTP to joint line. Anterior drawer test negative. Full ROM. Presentation likely secondary to muscle sprain. Other ddx considered include fracture vs ligamentous injury. WIll obtain XR right knee, motrin for pain and re-evaluate. Eleonora Haas, PGY6 PEM Fellow 54 yo female patient with right knee pain s/p trauma to knee hours prior to arrival in the ED. Appears in NAD, HDS, AF. Exam with Normocephalic head, atraumatic. PERRLA. MMM. Supple neck. S1/S2 normal; no murmurs. CTABL. ABD soft., NTND. CN II-XII intact. Right knee with mild tenderness to medial and lateral aspect of patella. No TTP to joint line. Anterior drawer test negative. Full ROM. Presentation likely secondary to muscle sprain. Other ddx considered include fracture vs ligamentous injury. WIll obtain XR right knee, motrin for pain and re-evaluate. Eleonora Haas, PGY6 PEM Fellow    Deana Barrios MD - Attending Physician: Pt here with R knee pain s/p injury in door of bus/shuttle. Ambulatory, FROM of knee. Mild lateral patella tenderness otherwise no jointline tenderness. Likely contusion. XR, pain control

## 2024-09-05 NOTE — ED PROVIDER NOTE - OBJECTIVE STATEMENT
52 yo female with history of DM on Metformin and insulin, MI s/p stent on ASA, back pain and HTN on Metropolol presenting for right knee pain onset ~3 hours prior to ED presentation. Patient endorses that she was getting off of hospital shuttle when the  accidentally closed the door on her knee. She immediately felt numbness to the leg. Able to ambulate and proceeded to shift. At start of shift, wrapped knee with an ACE bandage. Did not notice 54 yo female with history of DM on Metformin and insulin, MI s/p stent on ASA, back pain and HTN on Metropolol presenting for right knee pain onset ~3 hours prior to ED presentation. Patient endorses that she was getting off of hospital shuttle when the  accidentally closed the door on her knee. She immediately felt numbness to the leg. Able to ambulate and proceeded to shift. At start of shift, wrapped knee with an ACE bandage. Did not notice swelling. Continued walking when pain became worse - 7-8/10; proceeded to the ED for an evaluation. Denies CP, SOB, headache, dizziness,  ABD pain, N/V/D, or LLE or bilateral RUE pain.     PMH: As per HPI  PSH: As per HPI  FH/SH: non-contributory, except as noted in the HPI  Allergies: No known drug allergies  Medications: As per HPI 52 yo female with history of CAD, anemia, DM on Metformin and insulin, MI s/p stent on ASA, back pain and HTN on Metropolol presenting for right knee pain onset ~3 hours prior to ED presentation. Patient endorses that she was getting off of hospital shuttle when the  accidentally closed the door on her knee. She immediately felt numbness to the leg. Able to ambulate and proceeded to shift. At start of shift, wrapped knee with an ACE bandage. Did not notice swelling. Continued walking when pain became worse - 7-8/10; proceeded to the ED for an evaluation. Denies CP, SOB, headache, dizziness,  ABD pain, N/V/D, or LLE or bilateral RUE pain.     PMH: As per HPI  PSH: As per HPI  FH/SH: non-contributory, except as noted in the HPI  Allergies: No known drug allergies  Medications: As per HPI

## 2024-09-05 NOTE — ED PROVIDER NOTE - ADDITIONAL NOTES AND INSTRUCTIONS:
This patient was seen in the ED and evaluated for right knee pain. Please continue resting the knee at intervals, apply ice, compress, and elevate when resting.    Please take ibuprofen every 6 hours as needed.

## 2024-09-05 NOTE — ED PROVIDER NOTE - PROGRESS NOTE DETAILS
XR right knee unremarkable. Will instruct patient to R.I.C.E. knee and continue motrin q6h for pain. Eleonora Haas, PGY6 PEM Fellow

## 2024-09-05 NOTE — ED PROVIDER NOTE - PATIENT PORTAL LINK FT
You can access the FollowMyHealth Patient Portal offered by [facility] by registering at the following website: [email or web address]. By joining [employer]’s FollowMyHealth portal, you will also be able to view your health information using other applications (apps) compatible with our system.

## 2024-09-05 NOTE — ED ADULT NURSE NOTE - OBJECTIVE STATEMENT
53y F A&Ox3, wheeled in to area. Presenting to the ER s/p knee injury while at work. Pt was getting off shuttle bus when the door closed on her right knee. I immediately felt a numbness in the knee, but able to ambulate into building a work for about 2hrs before coming to ER. Pt immediately wrapped knee when she got into work. And pain began when she started working and walking on knee. "My whole right leg f eels numb". Pt endorsing  sensation to right leg vs left.

## 2024-09-05 NOTE — ED PROVIDER NOTE - PHYSICAL EXAMINATION
Const:  Alert and interactive, no acute distress  HEENT: Normocephalic, atraumatic; External ear wnl; Moist mucosa; Oropharynx clear; Neck supple  Lymph: No significant lymphadenopathy  CV: Heart regular, normal S1/2, no murmurs; Extremities [id number]  Pulm: Lungs clear to auscultation bilaterally  GI: Abdomen non-distended; No organomegaly, no tenderness, no masses  Skin: No rash noted  MSK: Mild tenderness to palpation to sides of right knee; no TTP along joint line; no tenderness to MCL or LCL. Anterior drawer test negative. No increased swelling, erythema or warmth to right knee. Full ROM of knee. No TTP of C/T/L spine.   Neuro: CN II-XII grossly intact; sensation intact bilaterally but endorses feels slightly different in RLE as compared to LLE.

## 2024-09-05 NOTE — ED ADULT TRIAGE NOTE - HEIGHT IN FEET
PLAN:                                                      1.  MEDICATIONS:        - Increase gabapentin to 100 mg in am, 100 mg midday, and 300 mg at bedtime        - Continue other medications without change  2.  Follow-up in 6 weeks  3.  Orthopedic consultation regarding knee problems  4.  Check fasting labs in 6 months, then make an office appointment with MD to review the results.   
5

## 2024-09-17 ENCOUNTER — RX RENEWAL (OUTPATIENT)
Age: 54
End: 2024-09-17

## 2024-09-26 ENCOUNTER — NON-APPOINTMENT (OUTPATIENT)
Age: 54
End: 2024-09-26

## 2024-10-31 ENCOUNTER — APPOINTMENT (OUTPATIENT)
Dept: CARDIOLOGY | Facility: CLINIC | Age: 54
End: 2024-10-31

## 2024-10-31 ENCOUNTER — NON-APPOINTMENT (OUTPATIENT)
Age: 54
End: 2024-10-31

## 2024-10-31 VITALS
WEIGHT: 222 LBS | DIASTOLIC BLOOD PRESSURE: 82 MMHG | SYSTOLIC BLOOD PRESSURE: 119 MMHG | HEART RATE: 80 BPM | HEIGHT: 67 IN | OXYGEN SATURATION: 99 % | BODY MASS INDEX: 34.84 KG/M2

## 2024-10-31 DIAGNOSIS — R93.1 ABNORMAL FINDINGS ON DIAGNOSTIC IMAGING OF HEART AND CORONARY CIRCULATION: ICD-10-CM

## 2024-10-31 DIAGNOSIS — E78.5 HYPERLIPIDEMIA, UNSPECIFIED: ICD-10-CM

## 2024-10-31 DIAGNOSIS — I25.118 ATHEROSCLEROTIC HEART DISEASE OF NATIVE CORONARY ARTERY WITH OTHER FORMS OF ANGINA PECTORIS: ICD-10-CM

## 2024-10-31 DIAGNOSIS — I10 ESSENTIAL (PRIMARY) HYPERTENSION: ICD-10-CM

## 2024-10-31 DIAGNOSIS — E88.810 METABOLIC SYNDROME: ICD-10-CM

## 2024-10-31 DIAGNOSIS — I25.10 ATHEROSCLEROTIC HEART DISEASE OF NATIVE CORONARY ARTERY W/OUT ANGINA PECTORIS: ICD-10-CM

## 2024-10-31 PROCEDURE — 99214 OFFICE O/P EST MOD 30 MIN: CPT

## 2024-10-31 PROCEDURE — 93000 ELECTROCARDIOGRAM COMPLETE: CPT

## 2024-10-31 PROCEDURE — G2211 COMPLEX E/M VISIT ADD ON: CPT

## 2024-11-07 ENCOUNTER — RX RENEWAL (OUTPATIENT)
Age: 54
End: 2024-11-07

## 2024-11-08 PROBLEM — E88.810 METABOLIC SYNDROME: Status: ACTIVE | Noted: 2023-05-29

## 2024-11-15 ENCOUNTER — APPOINTMENT (OUTPATIENT)
Dept: ENDOCRINOLOGY | Facility: CLINIC | Age: 54
End: 2024-11-15

## 2024-11-15 VITALS
SYSTOLIC BLOOD PRESSURE: 126 MMHG | OXYGEN SATURATION: 97 % | HEIGHT: 67 IN | HEART RATE: 93 BPM | BODY MASS INDEX: 34.43 KG/M2 | DIASTOLIC BLOOD PRESSURE: 82 MMHG | WEIGHT: 219.38 LBS

## 2024-11-15 DIAGNOSIS — E11.9 TYPE 2 DIABETES MELLITUS W/OUT COMPLICATIONS: ICD-10-CM

## 2024-11-15 DIAGNOSIS — E78.5 HYPERLIPIDEMIA, UNSPECIFIED: ICD-10-CM

## 2024-11-15 DIAGNOSIS — E66.9 OBESITY, UNSPECIFIED: ICD-10-CM

## 2024-11-15 LAB
GLUCOSE BLDC GLUCOMTR-MCNC: 144
HBA1C MFR BLD HPLC: 6.9

## 2024-11-15 PROCEDURE — 36415 COLL VENOUS BLD VENIPUNCTURE: CPT

## 2024-11-15 PROCEDURE — 95251 CONT GLUC MNTR ANALYSIS I&R: CPT

## 2024-11-15 PROCEDURE — 82962 GLUCOSE BLOOD TEST: CPT

## 2024-11-15 PROCEDURE — 99214 OFFICE O/P EST MOD 30 MIN: CPT

## 2024-11-15 PROCEDURE — 83036 HEMOGLOBIN GLYCOSYLATED A1C: CPT | Mod: QW

## 2024-11-18 LAB
ALBUMIN SERPL ELPH-MCNC: 4.4 G/DL
ALP BLD-CCNC: 126 U/L
ALT SERPL-CCNC: 22 U/L
ANION GAP SERPL CALC-SCNC: 12 MMOL/L
AST SERPL-CCNC: 19 U/L
BILIRUB SERPL-MCNC: 0.4 MG/DL
BUN SERPL-MCNC: 13 MG/DL
CALCIUM SERPL-MCNC: 10 MG/DL
CHLORIDE SERPL-SCNC: 106 MMOL/L
CHOLEST SERPL-MCNC: 195 MG/DL
CO2 SERPL-SCNC: 28 MMOL/L
CREAT SERPL-MCNC: 0.58 MG/DL
CREAT SPEC-SCNC: 76 MG/DL
EGFR: 108 ML/MIN/1.73M2
GLUCOSE SERPL-MCNC: 173 MG/DL
HDLC SERPL-MCNC: 50 MG/DL
LDLC SERPL CALC-MCNC: 117 MG/DL
MICROALBUMIN 24H UR DL<=1MG/L-MCNC: <1.2 MG/DL
MICROALBUMIN/CREAT 24H UR-RTO: NORMAL MG/G
NONHDLC SERPL-MCNC: 144 MG/DL
POTASSIUM SERPL-SCNC: 4.4 MMOL/L
PROT SERPL-MCNC: 7.3 G/DL
SODIUM SERPL-SCNC: 145 MMOL/L
T4 FREE SERPL-MCNC: 1.3 NG/DL
TRIGL SERPL-MCNC: 153 MG/DL
TSH SERPL-ACNC: 1.03 UIU/ML

## 2024-11-18 RX ORDER — TIRZEPATIDE 2.5 MG/.5ML
2.5 INJECTION, SOLUTION SUBCUTANEOUS
Qty: 1 | Refills: 1 | Status: ACTIVE | COMMUNITY
Start: 2024-11-15

## 2024-11-27 RX ORDER — METFORMIN HYDROCHLORIDE 1000 MG/1
1000 TABLET, COATED ORAL
Qty: 180 | Refills: 1 | Status: ACTIVE | COMMUNITY
Start: 2024-11-27 | End: 1900-01-01

## 2024-12-09 ENCOUNTER — RX RENEWAL (OUTPATIENT)
Age: 54
End: 2024-12-09

## 2024-12-26 ENCOUNTER — RX RENEWAL (OUTPATIENT)
Age: 54
End: 2024-12-26

## 2025-01-22 RX ORDER — TIRZEPATIDE 5 MG/.5ML
5 INJECTION, SOLUTION SUBCUTANEOUS
Qty: 1 | Refills: 1 | Status: ACTIVE | COMMUNITY
Start: 2025-01-22 | End: 1900-01-01

## 2025-03-06 ENCOUNTER — NON-APPOINTMENT (OUTPATIENT)
Age: 55
End: 2025-03-06

## 2025-03-06 ENCOUNTER — APPOINTMENT (OUTPATIENT)
Dept: CARDIOTHORACIC SURGERY | Facility: CLINIC | Age: 55
End: 2025-03-06

## 2025-03-06 ENCOUNTER — APPOINTMENT (OUTPATIENT)
Dept: CARDIOTHORACIC SURGERY | Facility: CLINIC | Age: 55
End: 2025-03-06
Payer: COMMERCIAL

## 2025-03-06 ENCOUNTER — APPOINTMENT (OUTPATIENT)
Dept: CARDIOLOGY | Facility: CLINIC | Age: 55
End: 2025-03-06

## 2025-03-06 VITALS
HEART RATE: 86 BPM | SYSTOLIC BLOOD PRESSURE: 112 MMHG | BODY MASS INDEX: 33.99 KG/M2 | OXYGEN SATURATION: 98 % | DIASTOLIC BLOOD PRESSURE: 77 MMHG | WEIGHT: 217 LBS

## 2025-03-06 DIAGNOSIS — E78.5 HYPERLIPIDEMIA, UNSPECIFIED: ICD-10-CM

## 2025-03-06 DIAGNOSIS — R94.31 ABNORMAL ELECTROCARDIOGRAM [ECG] [EKG]: ICD-10-CM

## 2025-03-06 DIAGNOSIS — I25.10 ATHEROSCLEROTIC HEART DISEASE OF NATIVE CORONARY ARTERY W/OUT ANGINA PECTORIS: ICD-10-CM

## 2025-03-06 DIAGNOSIS — R93.1 ABNORMAL FINDINGS ON DIAGNOSTIC IMAGING OF HEART AND CORONARY CIRCULATION: ICD-10-CM

## 2025-03-06 DIAGNOSIS — I25.118 ATHEROSCLEROTIC HEART DISEASE OF NATIVE CORONARY ARTERY WITH OTHER FORMS OF ANGINA PECTORIS: ICD-10-CM

## 2025-03-06 DIAGNOSIS — R94.39 ABNORMAL RESULT OF OTHER CARDIOVASCULAR FUNCTION STUDY: ICD-10-CM

## 2025-03-06 DIAGNOSIS — I10 ESSENTIAL (PRIMARY) HYPERTENSION: ICD-10-CM

## 2025-03-06 PROCEDURE — 93000 ELECTROCARDIOGRAM COMPLETE: CPT

## 2025-03-06 PROCEDURE — 99214 OFFICE O/P EST MOD 30 MIN: CPT

## 2025-03-06 PROCEDURE — G2211 COMPLEX E/M VISIT ADD ON: CPT

## 2025-03-07 LAB
ALBUMIN SERPL ELPH-MCNC: 4.4 G/DL
ALP BLD-CCNC: 131 U/L
ALT SERPL-CCNC: 28 U/L
ANION GAP SERPL CALC-SCNC: 11 MMOL/L
AST SERPL-CCNC: 29 U/L
BILIRUB SERPL-MCNC: 0.4 MG/DL
BUN SERPL-MCNC: 16 MG/DL
CALCIUM SERPL-MCNC: 9.8 MG/DL
CHLORIDE SERPL-SCNC: 103 MMOL/L
CHOLEST SERPL-MCNC: 130 MG/DL
CK SERPL-CCNC: 128 U/L
CO2 SERPL-SCNC: 27 MMOL/L
CREAT SERPL-MCNC: 0.59 MG/DL
EGFRCR SERPLBLD CKD-EPI 2021: 108 ML/MIN/1.73M2
GLUCOSE SERPL-MCNC: 143 MG/DL
HDLC SERPL-MCNC: 47 MG/DL
LDLC SERPL CALC-MCNC: 64 MG/DL
NONHDLC SERPL-MCNC: 83 MG/DL
POTASSIUM SERPL-SCNC: 4.9 MMOL/L
PROT SERPL-MCNC: 7.3 G/DL
SODIUM SERPL-SCNC: 141 MMOL/L
TRIGL SERPL-MCNC: 100 MG/DL

## 2025-03-11 LAB — APO LP(A) SERPL-MCNC: 160.3 NMOL/L

## 2025-03-14 PROBLEM — R94.31 ABNORMAL ECG: Status: ACTIVE | Noted: 2025-03-14

## 2025-04-09 ENCOUNTER — APPOINTMENT (OUTPATIENT)
Dept: CARDIOLOGY | Facility: CLINIC | Age: 55
End: 2025-04-09
Payer: COMMERCIAL

## 2025-04-09 ENCOUNTER — NON-APPOINTMENT (OUTPATIENT)
Age: 55
End: 2025-04-09

## 2025-04-09 VITALS
WEIGHT: 218 LBS | HEART RATE: 71 BPM | BODY MASS INDEX: 34.14 KG/M2 | DIASTOLIC BLOOD PRESSURE: 70 MMHG | OXYGEN SATURATION: 97 % | SYSTOLIC BLOOD PRESSURE: 110 MMHG

## 2025-04-09 DIAGNOSIS — I10 ESSENTIAL (PRIMARY) HYPERTENSION: ICD-10-CM

## 2025-04-09 DIAGNOSIS — I25.10 ATHEROSCLEROTIC HEART DISEASE OF NATIVE CORONARY ARTERY W/OUT ANGINA PECTORIS: ICD-10-CM

## 2025-04-09 DIAGNOSIS — E78.5 HYPERLIPIDEMIA, UNSPECIFIED: ICD-10-CM

## 2025-04-09 DIAGNOSIS — R07.89 OTHER CHEST PAIN: ICD-10-CM

## 2025-04-09 PROCEDURE — 99402 PREV MED CNSL INDIV APPRX 30: CPT

## 2025-04-09 PROCEDURE — 99214 OFFICE O/P EST MOD 30 MIN: CPT | Mod: 25

## 2025-04-09 PROCEDURE — 93000 ELECTROCARDIOGRAM COMPLETE: CPT

## 2025-04-09 PROCEDURE — G0537: CPT

## 2025-04-09 RX ORDER — EVOLOCUMAB 140 MG/ML
140 INJECTION, SOLUTION SUBCUTANEOUS
Qty: 1 | Refills: 5 | Status: ACTIVE | COMMUNITY
Start: 2025-04-09 | End: 1900-01-01

## 2025-04-29 ENCOUNTER — APPOINTMENT (OUTPATIENT)
Dept: ENDOCRINOLOGY | Facility: CLINIC | Age: 55
End: 2025-04-29
Payer: COMMERCIAL

## 2025-04-29 VITALS
RESPIRATION RATE: 17 BRPM | OXYGEN SATURATION: 97 % | BODY MASS INDEX: 34.3 KG/M2 | DIASTOLIC BLOOD PRESSURE: 73 MMHG | TEMPERATURE: 97.9 F | HEIGHT: 67 IN | SYSTOLIC BLOOD PRESSURE: 115 MMHG | HEART RATE: 81 BPM | WEIGHT: 218.56 LBS

## 2025-04-29 DIAGNOSIS — E78.5 HYPERLIPIDEMIA, UNSPECIFIED: ICD-10-CM

## 2025-04-29 DIAGNOSIS — I25.10 ATHEROSCLEROTIC HEART DISEASE OF NATIVE CORONARY ARTERY W/OUT ANGINA PECTORIS: ICD-10-CM

## 2025-04-29 DIAGNOSIS — E11.9 TYPE 2 DIABETES MELLITUS W/OUT COMPLICATIONS: ICD-10-CM

## 2025-04-29 PROCEDURE — 95251 CONT GLUC MNTR ANALYSIS I&R: CPT

## 2025-04-29 PROCEDURE — 99214 OFFICE O/P EST MOD 30 MIN: CPT

## 2025-04-30 ENCOUNTER — EMERGENCY (EMERGENCY)
Facility: HOSPITAL | Age: 55
LOS: 1 days | End: 2025-04-30
Attending: EMERGENCY MEDICINE
Payer: COMMERCIAL

## 2025-04-30 VITALS
WEIGHT: 220.02 LBS | DIASTOLIC BLOOD PRESSURE: 74 MMHG | RESPIRATION RATE: 18 BRPM | TEMPERATURE: 98 F | SYSTOLIC BLOOD PRESSURE: 137 MMHG | HEIGHT: 69 IN | OXYGEN SATURATION: 100 % | HEART RATE: 90 BPM

## 2025-04-30 DIAGNOSIS — Z98.51 TUBAL LIGATION STATUS: Chronic | ICD-10-CM

## 2025-04-30 DIAGNOSIS — Z90.49 ACQUIRED ABSENCE OF OTHER SPECIFIED PARTS OF DIGESTIVE TRACT: Chronic | ICD-10-CM

## 2025-04-30 DIAGNOSIS — Z95.5 PRESENCE OF CORONARY ANGIOPLASTY IMPLANT AND GRAFT: Chronic | ICD-10-CM

## 2025-04-30 PROCEDURE — 73610 X-RAY EXAM OF ANKLE: CPT | Mod: 26,RT

## 2025-04-30 PROCEDURE — 73590 X-RAY EXAM OF LOWER LEG: CPT

## 2025-04-30 PROCEDURE — 73630 X-RAY EXAM OF FOOT: CPT | Mod: 26,RT

## 2025-04-30 PROCEDURE — 73630 X-RAY EXAM OF FOOT: CPT

## 2025-04-30 PROCEDURE — 73610 X-RAY EXAM OF ANKLE: CPT

## 2025-04-30 PROCEDURE — 99284 EMERGENCY DEPT VISIT MOD MDM: CPT

## 2025-04-30 PROCEDURE — 99284 EMERGENCY DEPT VISIT MOD MDM: CPT | Mod: 25

## 2025-04-30 PROCEDURE — 73590 X-RAY EXAM OF LOWER LEG: CPT | Mod: 26,RT

## 2025-04-30 RX ORDER — IBUPROFEN 200 MG
600 TABLET ORAL ONCE
Refills: 0 | Status: COMPLETED | OUTPATIENT
Start: 2025-04-30 | End: 2025-04-30

## 2025-04-30 RX ORDER — MAGNESIUM, ALUMINUM HYDROXIDE 200-200 MG
30 TABLET,CHEWABLE ORAL ONCE
Refills: 0 | Status: DISCONTINUED | OUTPATIENT
Start: 2025-04-30 | End: 2025-04-30

## 2025-04-30 RX ADMIN — Medication 600 MILLIGRAM(S): at 18:25

## 2025-05-01 ENCOUNTER — RESULT CHARGE (OUTPATIENT)
Age: 55
End: 2025-05-01

## 2025-05-01 RX ORDER — TIRZEPATIDE 7.5 MG/.5ML
7.5 INJECTION, SOLUTION SUBCUTANEOUS
Qty: 1 | Refills: 3 | Status: ACTIVE | COMMUNITY
Start: 2025-05-01 | End: 1900-01-01

## 2025-05-02 ENCOUNTER — APPOINTMENT (OUTPATIENT)
Dept: ORTHOPEDIC SURGERY | Facility: CLINIC | Age: 55
End: 2025-05-02
Payer: OTHER MISCELLANEOUS

## 2025-05-02 VITALS — HEIGHT: 67 IN | WEIGHT: 218 LBS | BODY MASS INDEX: 34.21 KG/M2

## 2025-05-02 DIAGNOSIS — S99.911A UNSPECIFIED INJURY OF RIGHT ANKLE, INITIAL ENCOUNTER: ICD-10-CM

## 2025-05-02 LAB — HBA1C MFR BLD HPLC: 7.3

## 2025-05-02 PROCEDURE — 99203 OFFICE O/P NEW LOW 30 MIN: CPT

## 2025-05-02 RX ORDER — DICLOFENAC SODIUM 50 MG/1
50 TABLET, DELAYED RELEASE ORAL
Qty: 28 | Refills: 0 | Status: ACTIVE | COMMUNITY
Start: 2025-05-02 | End: 1900-01-01

## 2025-05-13 ENCOUNTER — RX RENEWAL (OUTPATIENT)
Age: 55
End: 2025-05-13

## 2025-05-16 ENCOUNTER — APPOINTMENT (OUTPATIENT)
Dept: ORTHOPEDIC SURGERY | Facility: CLINIC | Age: 55
End: 2025-05-16

## 2025-05-16 VITALS — WEIGHT: 218 LBS | BODY MASS INDEX: 34.21 KG/M2 | HEIGHT: 67 IN

## 2025-05-16 DIAGNOSIS — S99.911A UNSPECIFIED INJURY OF RIGHT ANKLE, INITIAL ENCOUNTER: ICD-10-CM

## 2025-05-16 PROCEDURE — 99213 OFFICE O/P EST LOW 20 MIN: CPT

## 2025-06-16 ENCOUNTER — RX RENEWAL (OUTPATIENT)
Age: 55
End: 2025-06-16

## 2025-08-11 ENCOUNTER — RX RENEWAL (OUTPATIENT)
Age: 55
End: 2025-08-11

## 2025-08-14 ENCOUNTER — APPOINTMENT (OUTPATIENT)
Dept: ENDOCRINOLOGY | Facility: CLINIC | Age: 55
End: 2025-08-14
Payer: COMMERCIAL

## 2025-08-14 DIAGNOSIS — E78.5 HYPERLIPIDEMIA, UNSPECIFIED: ICD-10-CM

## 2025-08-14 DIAGNOSIS — E66.9 OBESITY, UNSPECIFIED: ICD-10-CM

## 2025-08-14 DIAGNOSIS — E11.9 TYPE 2 DIABETES MELLITUS W/OUT COMPLICATIONS: ICD-10-CM

## 2025-08-14 LAB — HBA1C MFR BLD HPLC: 6.6

## 2025-08-14 PROCEDURE — 36415 COLL VENOUS BLD VENIPUNCTURE: CPT

## 2025-08-14 PROCEDURE — 83036 HEMOGLOBIN GLYCOSYLATED A1C: CPT | Mod: QW

## 2025-08-14 PROCEDURE — 95251 CONT GLUC MNTR ANALYSIS I&R: CPT

## 2025-08-14 PROCEDURE — 99214 OFFICE O/P EST MOD 30 MIN: CPT

## 2025-08-15 LAB
ALBUMIN SERPL ELPH-MCNC: 4.5 G/DL
ALBUMIN, RANDOM URINE: <1.2 MG/DL
ALP BLD-CCNC: 154 U/L
ALT SERPL-CCNC: 37 U/L
ANION GAP SERPL CALC-SCNC: 12 MMOL/L
AST SERPL-CCNC: 25 U/L
BILIRUB SERPL-MCNC: 0.2 MG/DL
BUN SERPL-MCNC: 22 MG/DL
CALCIUM SERPL-MCNC: 9.8 MG/DL
CHLORIDE SERPL-SCNC: 105 MMOL/L
CHOLEST SERPL-MCNC: 126 MG/DL
CO2 SERPL-SCNC: 25 MMOL/L
CREAT SERPL-MCNC: 0.64 MG/DL
CREAT SPEC-SCNC: 78 MG/DL
EGFRCR SERPLBLD CKD-EPI 2021: 106 ML/MIN/1.73M2
GLUCOSE SERPL-MCNC: 141 MG/DL
HDLC SERPL-MCNC: 45 MG/DL
LDLC SERPL-MCNC: 60 MG/DL
MICROALBUMIN/CREAT 24H UR-RTO: NORMAL MG/G
NONHDLC SERPL-MCNC: 81 MG/DL
POTASSIUM SERPL-SCNC: 4.1 MMOL/L
PROT SERPL-MCNC: 7.4 G/DL
SODIUM SERPL-SCNC: 142 MMOL/L
T4 FREE SERPL-MCNC: 1.2 NG/DL
TRIGL SERPL-MCNC: 113 MG/DL
TSH SERPL-ACNC: 2.09 UIU/ML